# Patient Record
Sex: MALE | Race: BLACK OR AFRICAN AMERICAN | NOT HISPANIC OR LATINO | Employment: OTHER | URBAN - METROPOLITAN AREA
[De-identification: names, ages, dates, MRNs, and addresses within clinical notes are randomized per-mention and may not be internally consistent; named-entity substitution may affect disease eponyms.]

---

## 2017-01-04 ENCOUNTER — CLINICAL SUPPORT (OUTPATIENT)
Dept: REHABILITATION | Facility: HOSPITAL | Age: 67
End: 2017-01-04
Attending: NEUROLOGICAL SURGERY
Payer: OTHER GOVERNMENT

## 2017-01-04 DIAGNOSIS — R29.3 ABNORMAL POSTURE: ICD-10-CM

## 2017-01-04 DIAGNOSIS — R29.898 LEFT ARM WEAKNESS: ICD-10-CM

## 2017-01-04 PROCEDURE — 97110 THERAPEUTIC EXERCISES: CPT

## 2017-01-04 NOTE — PROGRESS NOTES
"[facility]  PATIENT EVALUATION  Time in 1230  Time out 1330  Therex 60      Name:Caesar Arevalo  Physician:Serena Zelaya MD  Orders:  Physical Therapy evaluate and treat  Clinical#:[id number]  Diagnosis: s/p cervical fusion C5-7    Visit 5t8    HPI: Patient with long h/o neck and left arm pain, pain with lifting/using left arm.  Found to have severe degenerative disc disease cervical spine.  Unable to do push ups or pull ups.  Brace discharged  12/5/16. Now reports pain greatly improved, now limited primarily by "tightness".  Lifting limited to 20#.  Has pain medication, but no longer taking it.  Retired from [employer]    SUBJECTIVE:   Pt reporting continues chronic LB pain. Compliant with HEP. Pain scale 4/10.      Pain: denies pain in neck, c/o weakness and tightness  Chief complaint: left arm weakness/tightness  Radicular symptoms: left arm weakness  Dizziness: denies  Aggravating factors: difficulty turning head to drive  Easing factors: rest  Sleep is not  disturbed.  Previous functional status: independent without limitation  Current functional status : lifting limited to 10#      Allergies:  Review of patient's allergies indicates:  No Known Allergies      OBJECTIVE:_.      Therapeutic exercise: (60 min)  Pt was educated, performed, and was provided with a written copy of  HEP to perform as tolerated, including:    MH on cervical neck and LB  x 10' while performing:  DKTC on theraball 3x10/3"  PPT 3x10/3"  Seated neck flex/ext 10x/3"  Seated cervical rotation R/L with towel   3x/30"  Supine cervical chin tucks 10x/3"  Standing GTB shld ext 30x  Standing GTB horizontal abduction 30x  Door stretch 3x/30"  Shoulder abduction 5# 3x10  Push ups against elevated table    Exercises were reviewed and pt was able to demonstrate them prior to the end of the session.     No cultural, environmental, or spiritual barriers identified to treatment or learning.      ASSESSMENT  Pt tolerating  tx well. " VC/TC for correcting form/technique with exercise along with posture. Tolerated new exercises well without pain Continue with stretching and strengthening for improved functional mobility. Pt will benefit from outpatient physical therapy to improve his ROM, strength, balance and posture to enable him  to return to full pain free function    PLAN:  Outpatient physical therapy  2 times weekly to include: pt ed, hep, therapeutic exercises, neuromuscular re-education/ balance exercises, joint mobilizations, modalities prn. Pt may be seen by PTA to carry out plan of care.      Cont PT for 6 weeks.     Rodrigue Vargas PT, DPT

## 2017-01-06 ENCOUNTER — CLINICAL SUPPORT (OUTPATIENT)
Dept: REHABILITATION | Facility: HOSPITAL | Age: 67
End: 2017-01-06
Attending: NEUROLOGICAL SURGERY
Payer: OTHER GOVERNMENT

## 2017-01-06 DIAGNOSIS — R29.898 LEFT ARM WEAKNESS: Primary | ICD-10-CM

## 2017-01-06 PROCEDURE — 97110 THERAPEUTIC EXERCISES: CPT

## 2017-01-06 NOTE — PROGRESS NOTES
"[facility]  PATIENT EVALUATION  Time in 1230  Time out 1330  Therex 60      Name:Caesar Arevalo  Physician:Serena Zelaya MD  Orders:  Physical Therapy evaluate and treat  Clinical#:[id number]  Diagnosis: s/p cervical fusion C5-7    Visit 5t8    HPI: Patient with long h/o neck and left arm pain, pain with lifting/using left arm.  Found to have severe degenerative disc disease cervical spine.  Unable to do push ups or pull ups.  Brace discharged  12/5/16. Now reports pain greatly improved, now limited primarily by "tightness".  Lifting limited to 20#.  Has pain medication, but no longer taking it.  Retired from [employer]    SUBJECTIVE:   Pt reporting continues chronic LB pain. Compliant with HEP. Pain scale 4/10.      Pain: denies pain in neck, c/o weakness and tightness  Chief complaint: left arm weakness/tightness  Radicular symptoms: left arm weakness  Dizziness: denies  Aggravating factors: difficulty turning head to drive  Easing factors: rest  Sleep is not  disturbed.  Previous functional status: independent without limitation  Current functional status : lifting limited to 10#      Allergies:  Review of patient's allergies indicates:  No Known Allergies      OBJECTIVE:_.      Therapeutic exercise: (60 min)  Pt was educated, performed, and was provided with a written copy of  HEP to perform as tolerated, including:    MH on cervical neck and LB  x 10' while performing:  DKTC on theraball 3x10/3"  LTR theraball 3x10  PPT 3x10/3"  Seated neck flex/ext 10x/3"  Seated cervical rotation R/L with towel   3x/30"  Supine cervical chin tucks 10x/3"  Standing GTB shld ext 30x  Standing GTB horizontal abduction 30x  Door stretch 3x/30"  Shoulder abduction 5# 3x10  Push ups against elevated table 3x10    Exercises were reviewed and pt was able to demonstrate them prior to the end of the session.     No cultural, environmental, or spiritual barriers identified to treatment or " learning.      ASSESSMENT  Pt tolerating  tx well. VC/TC for correcting form/technique with exercise along with posture. Tolerated new exercises well without pain Continue with stretching and strengthening for improved functional mobility. Pt will benefit from outpatient physical therapy to improve his ROM, strength, balance and posture to enable him  to return to full pain free function    PLAN:  Outpatient physical therapy  2 times weekly to include: pt ed, hep, therapeutic exercises, neuromuscular re-education/ balance exercises, joint mobilizations, modalities prn. Pt may be seen by PTA to carry out plan of care.      Cont PT for 6 weeks.     Alfonso De Jesus PTA, STS

## 2017-01-11 ENCOUNTER — CLINICAL SUPPORT (OUTPATIENT)
Dept: REHABILITATION | Facility: HOSPITAL | Age: 67
End: 2017-01-11
Attending: NEUROLOGICAL SURGERY
Payer: OTHER GOVERNMENT

## 2017-01-11 DIAGNOSIS — R29.898 LEFT ARM WEAKNESS: ICD-10-CM

## 2017-01-11 DIAGNOSIS — R29.3 ABNORMAL POSTURE: ICD-10-CM

## 2017-01-11 PROCEDURE — 97110 THERAPEUTIC EXERCISES: CPT

## 2017-01-11 NOTE — PROGRESS NOTES
"[facility]  PROGRESS NOTE  Time in 1230  Time out 1330  Therex 60      Name:Caesar Arevalo  Physician:Serena Zelaya MD  Orders:  Physical Therapy evaluate and treat  Clinical#:[id number]  Diagnosis: s/p cervical fusion C5-7    Visit 7    HPI: Patient with long h/o neck and left arm pain, pain with lifting/using left arm.  Found to have severe degenerative disc disease cervical spine.  Unable to do push ups or pull ups.  Brace discharged  12/5/16. Now reports pain greatly improved, now limited primarily by "tightness".  Lifting limited to 20#.  Has pain medication, but no longer taking it.  Retired from [employer]    SUBJECTIVE:   Pt reporting continues chronic LB pain. Compliant with HEP. Pain scale 4/10.      Pain: denies pain in neck, c/o weakness and tightness  Chief complaint: left arm weakness/tightness  Radicular symptoms: left arm weakness  Dizziness: denies  Aggravating factors: difficulty turning head to drive  Easing factors: rest  Sleep is not  disturbed.  Previous functional status: independent without limitation  Current functional status : lifting limited to 10#      Allergies:  Review of patient's allergies indicates:  No Known Allergies      OBJECTIVE:_.      Therapeutic exercise: (60 min)  Pt was educated, performed, and was provided with a written copy of  HEP to perform as tolerated, including:    MH on cervical neck and LB  x 10' while performing:  LTR 10x10 sec  PPT 3x10/3"    Seated neck flex/ext 10x/3"  Seated cervical rotation R/L with towel   3x/30"  Supine cervical chin tucks 10x/3"  Standing GTB shld ext 30x  Standing GTB horizontal abduction 30x  Door stretch 3x/30"  Shoulder abduction 5# 3x10  Push ups against elevated table 3x10    Exercises were reviewed and pt was able to demonstrate them prior to the end of the session.     No cultural, environmental, or spiritual barriers identified to treatment or learning.      ASSESSMENT  Pt tolerating  tx well. VC/TC for " correcting form/technique with exercise along with posture. Tolerated new exercises well without pain Continue with stretching and strengthening for improved functional mobility. Pt will benefit from outpatient physical therapy to improve his ROM, strength, balance and posture to enable him  to return to full pain free function  Strength continues to improve, follow up with neurosurge    PLAN:  Outpatient physical therapy  2 times weekly to include: pt ed, hep, therapeutic exercises, neuromuscular re-education/ balance exercises, joint mobilizations, modalities prn. Pt may be seen by PTA to carry out plan of care.      Cont PT for 6 weeks.     Rodrigue Vargas PT, DPT

## 2017-01-13 ENCOUNTER — CLINICAL SUPPORT (OUTPATIENT)
Dept: REHABILITATION | Facility: HOSPITAL | Age: 67
End: 2017-01-13
Attending: NEUROLOGICAL SURGERY
Payer: OTHER GOVERNMENT

## 2017-01-13 DIAGNOSIS — R29.898 LEFT ARM WEAKNESS: Primary | ICD-10-CM

## 2017-01-13 PROCEDURE — 97110 THERAPEUTIC EXERCISES: CPT

## 2017-01-13 NOTE — PROGRESS NOTES
"[facility]  PROGRESS NOTE  Time in 1230  Time out 1330  Therex 60      Name:Caesar Arevalo  Physician:Serena Zelaya MD  Orders:  Physical Therapy evaluate and treat  Clinical#:[id number]  Diagnosis: s/p cervical fusion C5-7    Visit 7    HPI: Patient with long h/o neck and left arm pain, pain with lifting/using left arm.  Found to have severe degenerative disc disease cervical spine.  Unable to do push ups or pull ups.  Brace discharged  12/5/16. Now reports pain greatly improved, now limited primarily by "tightness".  Lifting limited to 20#.  Has pain medication, but no longer taking it.  Retired from [employer]    SUBJECTIVE:   Pt reporting chronic LB pain along with cervical discomfort. Compliant with HEP. Pain scale 4/10.      Pain: denies pain in neck, c/o weakness and tightness  Chief complaint: left arm weakness/tightness  Radicular symptoms: left arm weakness  Dizziness: denies  Aggravating factors: difficulty turning head to drive  Easing factors: rest  Sleep is not  disturbed.  Previous functional status: independent without limitation  Current functional status : lifting limited to 10#      Allergies:  Review of patient's allergies indicates:  No Known Allergies      OBJECTIVE:_.      Therapeutic exercise: (60 min)  Pt was educated, performed, and was provided with a written copy of  HEP to perform as tolerated, including:    MH on cervical neck and LB  x 10'   DKTC on theraball 30x  LTR with theraball 30x  PPT 3x10/3"  Seated neck flex/ext 10x/3"  Seated cervical rotation R/L with towel   3x/30"  Standing cervical chin tucks 30x  Standing GTB shld ext 30x  Standing GTB horizontal abduction 30x  Door stretch 3x/30"  Shoulder abduction 5# 3x10  Push ups against elevated table 3x10    Exercises were reviewed and pt was able to demonstrate them prior to the end of the session.     No cultural, environmental, or spiritual barriers identified to treatment or learning.      ASSESSMENT  Pt " tolerating  tx well. VC/TC for correcting form/technique with exercise along with posture. Tolerated new exercises well without pain Continue with stretching and strengthening for improved functional mobility. Pt will benefit from outpatient physical therapy to improve his ROM, strength, balance and posture to enable him  to return to full pain free function  Strength continues to improve, follow up with neurosurge    PLAN:  Outpatient physical therapy  2 times weekly to include: pt ed, hep, therapeutic exercises, neuromuscular re-education/ balance exercises, joint mobilizations, modalities prn. Pt may be seen by PTA to carry out plan of care.      Cont PT for 6 weeks.     Alfonso De Jesus PT, DPT

## 2017-01-16 ENCOUNTER — OFFICE VISIT (OUTPATIENT)
Dept: NEUROSURGERY | Facility: CLINIC | Age: 67
End: 2017-01-16
Payer: OTHER GOVERNMENT

## 2017-01-16 ENCOUNTER — HOSPITAL ENCOUNTER (OUTPATIENT)
Dept: RADIOLOGY | Facility: HOSPITAL | Age: 67
Discharge: HOME | End: 2017-01-16
Attending: NEUROLOGICAL SURGERY
Payer: OTHER GOVERNMENT

## 2017-01-16 VITALS
DIASTOLIC BLOOD PRESSURE: 97 MMHG | BODY MASS INDEX: 35.6 KG/M2 | SYSTOLIC BLOOD PRESSURE: 140 MMHG | HEART RATE: 97 BPM | HEIGHT: 71 IN | WEIGHT: 254.31 LBS | TEMPERATURE: 98 F

## 2017-01-16 DIAGNOSIS — M47.22 OSTEOARTHRITIS OF SPINE WITH RADICULOPATHY, CERVICAL REGION: ICD-10-CM

## 2017-01-16 DIAGNOSIS — M47.816 SPONDYLOSIS OF LUMBAR REGION WITHOUT MYELOPATHY OR RADICULOPATHY: ICD-10-CM

## 2017-01-16 DIAGNOSIS — M48.02 CERVICAL STENOSIS OF SPINAL CANAL: ICD-10-CM

## 2017-01-16 DIAGNOSIS — M47.22 OSTEOARTHRITIS OF SPINE WITH RADICULOPATHY, CERVICAL REGION: Primary | ICD-10-CM

## 2017-01-16 PROCEDURE — 72125 CT NECK SPINE W/O DYE: CPT | Mod: TC

## 2017-01-16 PROCEDURE — 99213 OFFICE O/P EST LOW 20 MIN: CPT | Mod: S$PBB,,, | Performed by: NEUROLOGICAL SURGERY

## 2017-01-16 PROCEDURE — 99999 PR PBB SHADOW E&M-EST. PATIENT-LVL III: CPT | Mod: PBBFAC,,, | Performed by: NEUROLOGICAL SURGERY

## 2017-01-16 PROCEDURE — 99213 OFFICE O/P EST LOW 20 MIN: CPT | Mod: PBBFAC | Performed by: NEUROLOGICAL SURGERY

## 2017-01-16 PROCEDURE — 72125 CT NECK SPINE W/O DYE: CPT | Mod: 26,,, | Performed by: RADIOLOGY

## 2017-01-16 RX ORDER — LISINOPRIL 5 MG/1
TABLET ORAL
COMMUNITY
Start: 2016-12-24 | End: 2017-02-01

## 2017-01-16 NOTE — PROGRESS NOTES
HISTORY OF PRESENT ILLNESS:  Mr. Arevalo is a 66-year-old male who is seeing me   today in followup.  His last Neurosurgery Clinic appointment was on 12/05/2016.    He underwent a C5-C6 and C6-C7 ACDF on 10/11/2016.  Preoperatively, he   complained of a longstanding history of neck pain and left arm pain, which   radiated into his forearm.  He also described proximal left arm weakness.    Imaging studies revealed significant neural foraminal narrowing at the C5-C6 and   C6-C7 levels on the left.  Given his clinical presentation as well as   radiographic findings, the decision was made to take him to the Operating Room   for a C5 through C7 ACDF.  At the time of his last clinic appointment, he was   doing relatively well.  He complained of some interscapular pain, but this was   not bothering him terribly.  Overall, he felt as if he was doing better.  He is   here today to see me in followup with a CAT scan of the cervical spine.    Currently, from a neck standpoint, he is doing well.  He does continue to   complain of some mild neck pain, which is interscapular.  This is a little bit   better than it was at the time of his last clinic appointment.  He denies any   pain radiating into his left arm.  He is guarding his left arm less than he was   at the time of his last clinic appointment.  What is worse for the patient   currently is his back pain.  He states that he has a chronic history of low back   pain, which goes mainly into his right buttock.  He denies any radiation into   his lower extremities.  He denies any lower extremity weakness or paresthesias.    This is tolerable with pain medication, but is persistent and worsening for the   patient.    PHYSICAL EXAMINATION:  NEUROLOGIC:  He has 5/5 motor strength throughout bilateral upper and lower   extremities including the deltoid, bicep, tricep, wrist extensor, wrist flexor,   hand intrinsic, iliopsoas, quadriceps, hamstring, gastrocnemius, tibialis    anterior and extensor hallucis longus.  His sensation is intact to light touch   bilaterally throughout all distributions.  He has no Guillen's and no clonus.    IMAGING:  He has a CAT scan of the cervical spine available for review, which I   personally reviewed.  This shows good placement of all cervical hardware.  It is   stable when compared to his postoperative films.  There is no evidence of   cervical fusion yet at this point.    IMPRESSION AND PLAN:  Mr. Arevalo is a 66-year-old male status post C5-C6 and   C6-C7 ACDF.  From a cervical standpoint, he is doing well.  From a cervical   standpoint, I would like to see him back in 3 months with a followup CAT scan of   the cervical spine at that time.  However, the patient continues to complain of   low back pain.  This has been an issue that has been plaguing him since prior   to surgery.  He wishes to work up the issue further at this point.  Since his   previous MRI is over a year old, I would like to start by getting an updated MRI   of the lumbar spine as well as flexion and extension x-rays of the lumbar   spine.  Once those imaging studies are complete, I will see the patient back in   followup and we will make a further treatment plan at that time.  The patient   knows he can call with any further questions or concerns.      BABAR/ROS  dd: 01/19/2017 13:20:40 (CST)  td: 01/20/2017 00:29:58 (CST)  Doc ID   #[id number]  Job ID #[id number]    CC:       Dictation #: [id number]

## 2017-01-16 NOTE — LETTER
January 19, 2017      Sophie Yan MD  [address]  [facility] [address]           [facility] - Neurosurgery 7th Fl  [address]  [facility] [address]  Phone: [phone]          Patient: Caesar Arevalo   MR Number: [id number]   YOB: 1950   Date of Visit: 1/16/2017       Dear Dr. Sophie Yan:    Thank you for referring Caesar Arevalo to me for evaluation. Attached you will find relevant portions of my assessment and plan of care.    If you have questions, please do not hesitate to call me. I look forward to following Caesar Arevalo along with you.    Sincerely,    Serena Zelaya MD    Enclosure  CC:  No Recipients    If you would like to receive this communication electronically, please contact [email or web address] or [phone] to request more information on [employer] access.    For providers and/or their staff who would like to refer a patient to [facility], please contact us through our one-stop-shop provider referral line, [facility], at [phone].    If you feel you have received this communication in error or would no longer like to receive these types of communications, please e-mail [email or web address]

## 2017-01-16 NOTE — MR AVS SNAPSHOT
[facility] - Neurosurgery 7th Fl  [address]  [facility] [address]  Phone: [phone]                  Caesar Arevalo   2017 3:30 PM   Office Visit    Description:  Male : 1950   Provider:  Serena Zelaya MD   Department:  [facility] - Neurosurgery 7th Fl           Diagnoses this Visit        Comments    Osteoarthritis of spine with radiculopathy, cervical region    -  Primary     Spondylosis of lumbar region without myelopathy or radiculopathy                To Do List           Future Appointments        Provider Department Dept Phone    2017 12:30 PM Rodrigue Vargas, PT [facility] [phone]    2017 12:30 PM Alfonso De Jesus PTA [facility] [phone]    2017 9:30 AM LAB, SAME DAY [facility] [phone]    2017 12:30 PM Rodrigue Vargas, PT [facility] [phone]    2017 12:30 PM Alfonso De Jesus PTA [facility] [phone]      Goals (5 Years of Data)     None      Follow-Up and Disposition     Return in about 3 months (around 2017).      [facility]     [facility] Care [facility] - 24/7 Assistance  Registered nurses in the [facility] provide clinical advisement, health education, appointment booking, and other advisory services.  Call for this free service at [phone].             Medications           Message regarding Medications     Verify the changes and/or additions to your medication regime listed below are the same as discussed with your clinician today.  If any of these changes or additions are incorrect, please notify your healthcare provider.             Verify that the below list of medications is an accurate representation of the medications you are currently taking.  If none reported, the list may be blank. If incorrect, please contact your healthcare provider. Carry this list with you in case of  "emergency.           Current Medications     amlodipine (NORVASC) 10 MG tablet Take 1 tablet (10 mg total) by mouth every morning.    atorvastatin (LIPITOR) 20 MG tablet Take 1 tablet (20 mg total) by mouth once daily.    blood sugar diagnostic Strp 1 strip by Misc.(Non-Drug; Combo Route) route 3 (three) times daily. freestyle    diazePAM (VALIUM) 5 MG tablet Take 1 tablet (5 mg total) by mouth every 6 (six) hours as needed (muscle spasms).    fluticasone (FLONASE) 50 mcg/actuation nasal spray 1 spray by Each Nare route once daily.    FREESTYLE LANCETS 28 gauge lancets     hydrocodone-acetaminophen 10-325mg (NORCO)  mg Tab Take 1 tablet by mouth every 6 (six) hours as needed (pain).    lancets (ACCU-CHEK MULTICLIX LANCET) Misc 1 lancet by Misc.(Non-Drug; Combo Route) route once daily. freestyle    linagliptin (TRADJENTA) 5 mg Tab tablet Take 1 tablet (5 mg total) by mouth once daily.    lisinopril (PRINIVIL,ZESTRIL) 5 MG tablet     lisinopril 10 MG tablet Take 1 tablet (10 mg total) by mouth once daily.    loratadine (CLARITIN) 10 mg tablet Take 1 tablet (10 mg total) by mouth once daily.    metformin (GLUCOPHAGE) 1000 MG tablet Take 1 tablet (1,000 mg total) by mouth 2 (two) times daily with meals.           Clinical Reference Information           Vital Signs - Last Recorded  Most recent update: 1/16/2017  4:27 PM by Arianne Cross MA    BP Pulse Temp    (!) 140/97 (BP Location: Right arm, Patient Position: Sitting, BP Method: Automatic) 97 97.8 °F (36.6 °C) (Oral)    Ht Wt BMI    5' 11" (1.803 m) 115.3 kg (254 lb 4.8 oz) 35.47 kg/m2      Blood Pressure          Most Recent Value    BP  (!)  140/97      Allergies as of 1/16/2017     No Known Allergies      Immunizations Administered on Date of Encounter - 1/16/2017     None      Orders Placed During Today's Visit     Future Labs/Procedures Expected by Expires    CT Cervical Spine Without Contrast  1/16/2017 1/16/2018    MRI Lumbar Spine Without Contrast  " 1/16/2017 1/16/2018    X-Ray Lumbar Complete With Flex And Ext  1/16/2017 1/16/2018

## 2017-01-18 ENCOUNTER — CLINICAL SUPPORT (OUTPATIENT)
Dept: REHABILITATION | Facility: HOSPITAL | Age: 67
End: 2017-01-18
Attending: NEUROLOGICAL SURGERY
Payer: OTHER GOVERNMENT

## 2017-01-18 DIAGNOSIS — R29.898 LEFT ARM WEAKNESS: ICD-10-CM

## 2017-01-18 DIAGNOSIS — R29.3 ABNORMAL POSTURE: ICD-10-CM

## 2017-01-18 PROCEDURE — G8985 CARRY GOAL STATUS: HCPCS | Mod: CK

## 2017-01-18 PROCEDURE — 97110 THERAPEUTIC EXERCISES: CPT

## 2017-01-18 PROCEDURE — G8986 CARRY D/C STATUS: HCPCS | Mod: CJ

## 2017-01-18 NOTE — PROGRESS NOTES
"[facility]  DISCHARGE NOTE  Time in 1230  Time out 1310  Therex 40      Name:Caesar Arevalo  Physician:Serena Zelaya MD  Orders:  Physical Therapy evaluate and treat  Clinical#:[id number]  Diagnosis: s/p cervical fusion C5-7    Visit 7    SUBJECTIVE:   "I saw the doctor and she told me I don't have any limitations.  I can try pushups and pull ups"      Allergies:  Review of patient's allergies indicates:  No Known Allergies      OBJECTIVE:_.      Therapeutic exercise: (40 min)  Reviewed HEP  Push ups  Shoulder press with dumbbells  Hangs on pull up bar supported by LEs    No cultural, environmental, or spiritual barriers identified to treatment or learning.    CMS Impairment/Limitation/Restriction for FOTO Thoracic Spine Survey    Status Limitation  G-Code   CMS Severity Modifier  Intake   53%  47%  Predicted  58%  42%   Goal Status   CK - At least 40 percent but less than 60 percent  12/30/2016  64%  36%   Current Status  CJ - At least 20 percent but less than 40 percent    ASSESSMENT  Tolerated well, able to perform push ups and pull ups and progress with free weights as symptoms allow.  Educated patient and he is going to gradually resume his normal activity as able and will follow up with any questions or concerns.  PT is therefore discharged with HEP and patient is agreeable  PLAN:   DC PT with HEP    Rodrigue Vargas PT, DPT  "

## 2017-01-25 ENCOUNTER — LAB VISIT (OUTPATIENT)
Dept: LAB | Facility: HOSPITAL | Age: 67
End: 2017-01-25
Attending: FAMILY MEDICINE
Payer: OTHER GOVERNMENT

## 2017-01-25 DIAGNOSIS — E11.9 TYPE 2 DIABETES MELLITUS WITHOUT COMPLICATION, WITHOUT LONG-TERM CURRENT USE OF INSULIN: ICD-10-CM

## 2017-01-25 PROCEDURE — 36415 COLL VENOUS BLD VENIPUNCTURE: CPT

## 2017-01-25 PROCEDURE — 83036 HEMOGLOBIN GLYCOSYLATED A1C: CPT

## 2017-01-26 LAB
ESTIMATED AVG GLUCOSE: 209 MG/DL
HBA1C MFR BLD HPLC: 8.9 %

## 2017-01-27 NOTE — PLAN OF CARE
"[facility]  DISCHARGE NOTE  Time in 1230  Time out 1310  Therex 40      Name:Caesar Arevalo  Physician:Serena Zelaya MD  Orders:  Physical Therapy evaluate and treat  Clinical#:[id number]  Diagnosis: s/p cervical fusion C5-7    Visit 7    SUBJECTIVE:   "I saw the doctor and she told me I don't have any limitations.  I can try pushups and pull ups"      Allergies:  Review of patient's allergies indicates:  No Known Allergies      OBJECTIVE:_.      Therapeutic exercise: (40 min)  Reviewed HEP  Push ups  Shoulder press with dumbbells  Hangs on pull up bar supported by LEs    No cultural, environmental, or spiritual barriers identified to treatment or learning.    CMS Impairment/Limitation/Restriction for FOTO Thoracic Spine Survey    Status Limitation  G-Code   CMS Severity Modifier  Intake   53%  47%  Predicted  58%  42%   Goal Status   CK - At least 40 percent but less than 60 percent  12/30/2016  64%  36%   Current Status  CJ - At least 20 percent but less than 40 percent    ASSESSMENT  Tolerated well, able to perform push ups and pull ups and progress with free weights as symptoms allow.  Educated patient and he is going to gradually resume his normal activity as able and will follow up with any questions or concerns.  PT is therefore discharged with HEP and patient is agreeable  PLAN:   DC PT with HEP    Rodrigue Vargas PT, DPT    "

## 2017-01-30 ENCOUNTER — HOSPITAL ENCOUNTER (OUTPATIENT)
Dept: RADIOLOGY | Facility: HOSPITAL | Age: 67
Discharge: HOME | End: 2017-01-30
Attending: NEUROLOGICAL SURGERY
Payer: OTHER GOVERNMENT

## 2017-01-30 ENCOUNTER — OFFICE VISIT (OUTPATIENT)
Dept: NEUROSURGERY | Facility: CLINIC | Age: 67
End: 2017-01-30
Payer: OTHER GOVERNMENT

## 2017-01-30 VITALS
HEART RATE: 99 BPM | TEMPERATURE: 98 F | SYSTOLIC BLOOD PRESSURE: 136 MMHG | BODY MASS INDEX: 35.38 KG/M2 | HEIGHT: 71 IN | DIASTOLIC BLOOD PRESSURE: 90 MMHG | WEIGHT: 252.69 LBS

## 2017-01-30 DIAGNOSIS — M47.22 OSTEOARTHRITIS OF SPINE WITH RADICULOPATHY, CERVICAL REGION: ICD-10-CM

## 2017-01-30 DIAGNOSIS — M47.816 SPONDYLOSIS OF LUMBAR REGION WITHOUT MYELOPATHY OR RADICULOPATHY: ICD-10-CM

## 2017-01-30 DIAGNOSIS — M47.816 SPONDYLOSIS OF LUMBAR REGION WITHOUT MYELOPATHY OR RADICULOPATHY: Primary | ICD-10-CM

## 2017-01-30 PROCEDURE — 72114 X-RAY EXAM L-S SPINE BENDING: CPT | Mod: 26,,, | Performed by: RADIOLOGY

## 2017-01-30 PROCEDURE — 72148 MRI LUMBAR SPINE W/O DYE: CPT | Mod: TC

## 2017-01-30 PROCEDURE — 99213 OFFICE O/P EST LOW 20 MIN: CPT | Mod: PBBFAC | Performed by: NEUROLOGICAL SURGERY

## 2017-01-30 PROCEDURE — 72114 X-RAY EXAM L-S SPINE BENDING: CPT | Mod: TC

## 2017-01-30 PROCEDURE — 99213 OFFICE O/P EST LOW 20 MIN: CPT | Mod: S$PBB,,, | Performed by: NEUROLOGICAL SURGERY

## 2017-01-30 PROCEDURE — 99999 PR PBB SHADOW E&M-EST. PATIENT-LVL III: CPT | Mod: PBBFAC,,, | Performed by: NEUROLOGICAL SURGERY

## 2017-01-30 PROCEDURE — 72148 MRI LUMBAR SPINE W/O DYE: CPT | Mod: 26,,, | Performed by: RADIOLOGY

## 2017-01-30 RX ORDER — MELOXICAM 7.5 MG/1
7.5 TABLET ORAL DAILY
Qty: 30 TABLET | Refills: 3 | Status: SHIPPED | OUTPATIENT
Start: 2017-01-30 | End: 2017-01-30

## 2017-01-30 RX ORDER — MELOXICAM 7.5 MG/1
7.5 TABLET ORAL DAILY
Qty: 30 TABLET | Refills: 4 | Status: SHIPPED | OUTPATIENT
Start: 2017-01-30 | End: 2017-06-06

## 2017-01-30 NOTE — PROGRESS NOTES
HISTORY OF PRESENT ILLNESS:  Mr. Arevalo is a 66-year-old male who is seeing me   today in followup.  His last Neurosurgery Clinic appointment was on 01/16/2017.    Mr. Arevalo underwent a C5-C6 and C6-C7 ACDF in October 2016.  I have been   following him for his neck pain.  He has done well postoperatively.  At the time   of his last clinic appointment, he complained of worse back pain.  He stated   that he has a chronic history of low back pain, which goes mainly into his right   buttock.  He denied any radiation into his lower extremities.  He denied any   lower extremity weakness or paresthesias.  The pain was tolerable with pain   medication, but was persistent and worsening.  He did not have any updated   imaging studies of the lumbar spine.  Therefore, I had sent him for   flexion-extension x-rays of the lumbar spine as well as an MRI of the lumbar   spine and he is here today to see me in followup.  Currently, he continues to   complain of pain.  He thinks that it may be a little bit better today, but the   pain does wax and wane.  Again, the pain is located mainly in his low back   without radiation.    PHYSICAL EXAMINATION:  He remains neurologically intact on today's examination   with 5/5 motor strength throughout bilateral lower extremities including the   iliopsoas, quadriceps, hamstring, gastrocnemius, tibialis anterior and extensor   hallucis longus.    IMAGING:  He has a flexion-extension x-ray of the lumbar spine available for   review, which I personally reviewed.  This shows multilevel facet hypertrophy,   worse at the L4-L5 and L5-S1 levels.  There is multilevel lumbar spondylosis and   degenerative disc disease.  He has an MRI of the lumbar spine available for   review, which I personally reviewed.  I again this re-demonstrates multilevel   lumbar spondylosis.  There is epidural lipomatosis from L3-S1 causing moderate   central canal narrowing.  Otherwise, there is no significant central  canal   narrowing or neural foraminal narrowing throughout, but there is multilevel   facet hypertrophy again worse at the L3-L4 and L4-L5 levels.    IMPRESSION AND PLAN:  Mr. Arevalo is a 66-year-old male status post C5-C6 and   C6-C7 ACDF.  He is doing well from a neck standpoint, but continues to complain   of low back pain.  His imaging studies are as described above.  I do believe   that his pain is caused from his significant facet arthropathy, but I do not   believe that this is surgical.  I have given the patient a prescription for   Mobic to see if this helps his pain.  I have also given him a referral to Pain   Management for possible facet injections.  He already has followup scheduled   with me in 2-3 months for his cervical spine.  We will rediscuss his lumbar   spine issues at that time as well.  Otherwise, he knows he can call with any   further questions or concerns.      DUNIA  dd: 01/30/2017 17:24:50 (CST)  td: 01/31/2017 11:41:27 (CST)  Doc ID   #[id number]  Job ID #[id number]    CC:       Dictation #: [id number]

## 2017-02-01 ENCOUNTER — TELEPHONE (OUTPATIENT)
Dept: INTERNAL MEDICINE | Facility: CLINIC | Age: 67
End: 2017-02-01

## 2017-02-01 ENCOUNTER — HOSPITAL ENCOUNTER (OUTPATIENT)
Dept: RADIOLOGY | Facility: HOSPITAL | Age: 67
Discharge: HOME | End: 2017-02-01
Attending: FAMILY MEDICINE
Payer: OTHER GOVERNMENT

## 2017-02-01 ENCOUNTER — CLINICAL SUPPORT (OUTPATIENT)
Dept: DIABETES | Facility: CLINIC | Age: 67
End: 2017-02-01
Payer: OTHER GOVERNMENT

## 2017-02-01 ENCOUNTER — OFFICE VISIT (OUTPATIENT)
Dept: INTERNAL MEDICINE | Facility: CLINIC | Age: 67
End: 2017-02-01
Payer: OTHER GOVERNMENT

## 2017-02-01 VITALS
DIASTOLIC BLOOD PRESSURE: 80 MMHG | HEART RATE: 99 BPM | SYSTOLIC BLOOD PRESSURE: 132 MMHG | OXYGEN SATURATION: 96 % | WEIGHT: 250.88 LBS | HEIGHT: 71 IN | BODY MASS INDEX: 35.12 KG/M2

## 2017-02-01 DIAGNOSIS — Z22.7 TB LUNG, LATENT: ICD-10-CM

## 2017-02-01 DIAGNOSIS — I10 ESSENTIAL HYPERTENSION: Primary | ICD-10-CM

## 2017-02-01 DIAGNOSIS — Z87.891 FORMER SMOKER: ICD-10-CM

## 2017-02-01 DIAGNOSIS — R79.89 LOW VITAMIN D LEVEL: ICD-10-CM

## 2017-02-01 DIAGNOSIS — E78.5 HYPERLIPIDEMIA, UNSPECIFIED HYPERLIPIDEMIA TYPE: ICD-10-CM

## 2017-02-01 DIAGNOSIS — E11.9 TYPE 2 DIABETES MELLITUS WITHOUT COMPLICATION, WITHOUT LONG-TERM CURRENT USE OF INSULIN: ICD-10-CM

## 2017-02-01 DIAGNOSIS — M47.816 LUMBAR FACET ARTHROPATHY: ICD-10-CM

## 2017-02-01 PROCEDURE — 71020 XR CHEST PA AND LATERAL: CPT | Mod: 26,,, | Performed by: RADIOLOGY

## 2017-02-01 PROCEDURE — 99999 PR PBB SHADOW E&M-EST. PATIENT-LVL III: CPT | Mod: PBBFAC,,, | Performed by: FAMILY MEDICINE

## 2017-02-01 PROCEDURE — G0108 DIAB MANAGE TRN  PER INDIV: HCPCS | Mod: PBBFAC | Performed by: DIETITIAN, REGISTERED

## 2017-02-01 PROCEDURE — 71020 XR CHEST PA AND LATERAL: CPT | Mod: TC

## 2017-02-01 PROCEDURE — 99213 OFFICE O/P EST LOW 20 MIN: CPT | Mod: PBBFAC | Performed by: FAMILY MEDICINE

## 2017-02-01 PROCEDURE — 99214 OFFICE O/P EST MOD 30 MIN: CPT | Mod: S$PBB,,, | Performed by: FAMILY MEDICINE

## 2017-02-01 RX ORDER — AMLODIPINE BESYLATE 10 MG/1
10 TABLET ORAL EVERY MORNING
Qty: 90 TABLET | Refills: 1 | Status: SHIPPED | OUTPATIENT
Start: 2017-02-01 | End: 2017-06-06 | Stop reason: SDUPTHER

## 2017-02-01 RX ORDER — LISINOPRIL 10 MG/1
10 TABLET ORAL DAILY
Qty: 90 TABLET | Refills: 1 | Status: SHIPPED | OUTPATIENT
Start: 2017-02-01 | End: 2017-06-06 | Stop reason: SDUPTHER

## 2017-02-01 RX ORDER — METFORMIN HYDROCHLORIDE 1000 MG/1
1000 TABLET ORAL 2 TIMES DAILY WITH MEALS
Qty: 180 TABLET | Refills: 1 | Status: SHIPPED | OUTPATIENT
Start: 2017-02-01 | End: 2017-06-06 | Stop reason: SDUPTHER

## 2017-02-01 RX ORDER — ATORVASTATIN CALCIUM 20 MG/1
20 TABLET, FILM COATED ORAL EVERY MORNING
Qty: 90 TABLET | Refills: 1 | Status: SHIPPED | OUTPATIENT
Start: 2017-02-01 | End: 2017-06-06 | Stop reason: SDUPTHER

## 2017-02-01 NOTE — MR AVS SNAPSHOT
[facility]  [address] [address]  Phone: [phone]  Fax: [phone]                  Caesar Arevalo   2017 9:30 AM   Office Visit    Description:  Male : 1950   Provider:  Sophie Yan MD   Department:  [facility]           Reason for Visit     Follow-up           Diagnoses this Visit        Comments    Essential hypertension    -  Primary     Type 2 diabetes mellitus without complication, without long-term current use of insulin         Hyperlipidemia, unspecified hyperlipidemia type         Low vitamin D level         Lumbar facet arthropathy         TB lung, latent         Former smoker                To Do List           Future Appointments        Provider Department Dept Phone    2017 9:30 AM [facility] CT1 [address]- LIMIT [address] L[address] [facility] [phone]    2017 11:30 AM [facility] - Neurosurgery 7th Fl [phone]      Goals (5 Years of Data)     None      Follow-Up and Disposition     Return in about 4 months (around 2017), or if symptoms worsen or fail to improve.    Follow-up and Disposition History       These Medications        Disp Refills Start End    metformin (GLUCOPHAGE) 1000 MG tablet 180 tablet 1 2017    Take 1 tablet (1,000 mg total) by mouth 2 (two) times daily with meals. - Oral    Pharmacy: [facility] - [address] Ph #: [phone]       linagliptin (TRADJENTA) 5 mg Tab tablet 90 tablet 1 2017     Take 1 tablet (5 mg total) by mouth once daily. - Oral    Pharmacy: [facility] - [address] Ph #: [phone]       blood sugar diagnostic Strp 300 strip 6 2017     1 strip by Misc.(Non-Drug; Combo Route) route 3 (three) times daily. freestyle - Misc.(Non-Drug; Combo Route)    Pharmacy: [facility] - [address] Ph #: [phone]       atorvastatin (LIPITOR) 20 MG tablet 90 tablet 1 2/1/2017     Take 1 tablet (20 mg total) by mouth every morning. - Oral    Pharmacy: [facility] - [address] Ph #: [phone]       amlodipine (NORVASC) 10 MG tablet 90 tablet 1 2/1/2017 2/1/2018    Take 1 tablet (10 mg total) by mouth every morning. - Oral    Pharmacy: [facility] - [address] Ph #: [phone]       lisinopril 10 MG tablet 90 tablet 1 2/1/2017 2/1/2018    Take 1 tablet (10 mg total) by mouth once daily. - Oral    Pharmacy: [facility] - [address] Ph #: [phone]         [facility]     [facility] - 24/7 Assistance  Registered nurses in the [facility] provide clinical advisement, health education, appointment booking, and other advisory services.  Call for this free service at [phone].             Medications           Message regarding Medications     Verify the changes and/or additions to your medication regime listed below are the same as discussed with your clinician today.  If any of these changes or additions are incorrect, please notify your healthcare provider.        CHANGE how you are taking these medications     Start Taking Instead of    atorvastatin (LIPITOR) 20 MG tablet atorvastatin (LIPITOR) 20 MG tablet    Dosage:  Take 1 tablet (20 mg total) by mouth every morning. Dosage:  Take 1 tablet (20 mg total) by mouth once daily.    Reason for Change:  Reorder            Verify that the below list of medications is an accurate representation of the medications you are currently taking.  If none reported, the list may be blank. If incorrect, please contact your healthcare provider. Carry this list with you in case of emergency.           Current Medications     amlodipine (NORVASC) 10 MG tablet Take 1 tablet (10 mg total) by  "mouth every morning.    atorvastatin (LIPITOR) 20 MG tablet Take 1 tablet (20 mg total) by mouth every morning.    blood sugar diagnostic Strp 1 strip by Misc.(Non-Drug; Combo Route) route 3 (three) times daily. freestyle    diazePAM (VALIUM) 5 MG tablet Take 1 tablet (5 mg total) by mouth every 6 (six) hours as needed (muscle spasms).    fluticasone (FLONASE) 50 mcg/actuation nasal spray 1 spray by Each Nare route once daily.    FREESTYLE LANCETS 28 gauge lancets     lancets (ACCU-CHEK MULTICLIX LANCET) Misc 1 lancet by Misc.(Non-Drug; Combo Route) route once daily. freestyle    linagliptin (TRADJENTA) 5 mg Tab tablet Take 1 tablet (5 mg total) by mouth once daily.    metformin (GLUCOPHAGE) 1000 MG tablet Take 1 tablet (1,000 mg total) by mouth 2 (two) times daily with meals.    hydrocodone-acetaminophen 10-325mg (NORCO)  mg Tab Take 1 tablet by mouth every 6 (six) hours as needed (pain).    lisinopril 10 MG tablet Take 1 tablet (10 mg total) by mouth once daily.    loratadine (CLARITIN) 10 mg tablet Take 1 tablet (10 mg total) by mouth once daily.    meloxicam (MOBIC) 7.5 MG tablet Take 1 tablet (7.5 mg total) by mouth once daily.           Clinical Reference Information           Vital Signs - Last Recorded  Most recent update: 2/1/2017  9:39 AM by Becca Adams    BP Pulse Ht Wt SpO2 BMI    132/80 99 5' 11" (1.803 m) 113.8 kg (250 lb 14.1 oz) 96% 34.99 kg/m2      Blood Pressure          Most Recent Value    BP  132/80      Allergies as of 2/1/2017     No Known Allergies      Immunizations Administered on Date of Encounter - 2/1/2017     None      Orders Placed During Today's Visit     Future Labs/Procedures Expected by Expires    CBC auto differential  2/1/2017 4/2/2018    Comprehensive metabolic panel  2/1/2017 4/2/2018    Lipid panel  2/1/2017 4/2/2018    TSH  2/1/2017 4/2/2018    Vitamin D  2/1/2017 4/2/2018    X-Ray Chest PA And Lateral  2/1/2017 2/1/2018    Hemoglobin A1c  1/2/2018 (Approximate) 4/2/2018 "    Complete PFT w/ bronchodilator  As directed 2/1/2018      Instructions    Cymbalta

## 2017-02-01 NOTE — PROGRESS NOTES
02/01/17 0000   Diabetes Type   Diabetes Type  Type II  (6 month follow up)   Diabetes History   Diabetes Diagnosis >10 years   Nutrition   Meal Planning skipping meals;eats out seldom;snacks between meal;diet drinks  ((Eats 2-3 meals a day; has some tendency to be a nervous snacker (sweets tends to ease his mind); drinks cyrstal light, diet drinks, coffee, some milk and juice,))   Monitoring    Monitoring Free Lite   Self Monitoring  (Checks 3 times a day usually)   Blood Glucose Logs No   Exercise    Exercise Type (None; C5-C6 and C6-C7 ACDF in October 2016; still suffers from back pain)   Current Diabetes Treatment    Current Treatment Oral Medicaiton  (Tradjenta and Metformin - denies missing doses of medication)   Diabetes Education Visit   Diabetes Education Record Assessment/Progress Post Program/Follow-up   Diabetes Education Assessment/Progress   Acute Complications (preventing, detecting, and treating acute complications) 5;DC;IS;W  (Reviewed s/s and treatment of hypoglycemia)   Chronic Complications (preventing, detecting, and treating chronic complications) 5;DC;IS;W   Diabetes Disease Process (diabetes disease process and treatment options) 5;DC;IS;W   Nutrition (Incorporating nutritional management into one's lifestyle) 5;DC;IS;W  (Reviewed plate method and encouraged to eat 3 meals a day)   Physical Activity (incorporating physical activity into one's lifestyle) 5;DC;IS;W   Medications (states correct name, dose, onset, peak, duration, side effects & timing of meds) 5;DC;IS;W   Monitoring (monitoring blood glucose/other parameters &using results) 5;DC;IS;W   Goal Setting and Problem Solving (verbalizes behavior change strategies & sets realistic goals) 5;DC;IS   Behavior Change (developing personal strategies to health & behavior change) 5;DC;IS   Psychosocial Issues (deveopling personal srategies to address psychosocial concerns) 5;DC;IS  (Patient reports his stress has improved since his last  visit)   Goals   Other % Met   Met Percentage  100%   Diabetes Self-Management Support Plan   Stress Management family;friends   Review Status Patient reviewed and agreed to support plan.   Diabetes Care Plan/Intervention   Education Plan/Intervention Other  (Patient to call with questions or concerns or if would like to schedule a follow up. He is scheduled with his PCP in June. He is still not interested in Empowerment at this time.)   Education Units of Time    Time Spent 30 min

## 2017-02-01 NOTE — PROGRESS NOTES
Subjective:       Patient ID: Caesar Arevalo is a 66 y.o. male.    Chief Complaint: Follow-up    HPI 65y/o with DM2, HTN, is post op ACDF on 10/11/16 his neck has improved overall, CLBP,is here for follow up.   He is not doing much exercise, he tries to bike sometimes, denies f/n/v/d/constipation/cp/sob/urinary sx.   DM2: last few A1C trending up 8.9, he reports he has been checking in the mornings range 118-180, not on asa, on statin, ace, plans to follow up diabetes empowerment  CLBP: reports he is not taking Norco or Valium, plans to try Mobic  Script for Zoster given  Hx prostate procedure, recently seen by Dr. Chavarria in Nov, reports psa was doen    Review of Systems   Constitutional: Negative for activity change, appetite change, fatigue and fever.   Respiratory: Negative for cough and shortness of breath.    Cardiovascular: Negative for chest pain, palpitations and leg swelling.   Gastrointestinal: Negative for constipation, diarrhea, nausea and vomiting.   Genitourinary: Negative for difficulty urinating and dysuria.   Musculoskeletal: Positive for arthralgias and back pain.   Skin: Negative for rash.   Neurological: Negative for dizziness and light-headedness.   Psychiatric/Behavioral: Negative for sleep disturbance.       Objective:      Physical Exam   Constitutional: He appears well-developed and well-nourished.   HENT:   Head: Normocephalic and atraumatic.   Mouth/Throat: No oropharyngeal exudate.   Neck: Normal range of motion. Neck supple. No thyromegaly present.   Cardiovascular: Normal rate, regular rhythm and normal heart sounds.    Pulmonary/Chest: Effort normal and breath sounds normal. No respiratory distress.   Musculoskeletal: He exhibits no edema.   Lymphadenopathy:     He has no cervical adenopathy.   Neurological: He is alert.   Skin: Skin is warm and dry.   Psychiatric: He has a normal mood and affect.   Nursing note and vitals reviewed.      Assessment:       1. Essential hypertension     2. Type 2 diabetes mellitus without complication, without long-term current use of insulin    3. Hyperlipidemia, unspecified hyperlipidemia type    4. Low vitamin D level    5. Lumbar facet arthropathy    6. TB lung, latent    7. Former smoker        Plan:       Caesar was seen today for follow-up.    Diagnoses and all orders for this visit:    Essential hypertension  -     amlodipine (NORVASC) 10 MG tablet; Take 1 tablet (10 mg total) by mouth every morning.  -     lisinopril 10 MG tablet; Take 1 tablet (10 mg total) by mouth once daily.  -     CBC auto differential; Future  -     Comprehensive metabolic panel; Future  -     TSH; Future    Type 2 diabetes mellitus without complication, without long-term current use of insulin  -     metformin (GLUCOPHAGE) 1000 MG tablet; Take 1 tablet (1,000 mg total) by mouth 2 (two) times daily with meals.  -     linagliptin (TRADJENTA) 5 mg Tab tablet; Take 1 tablet (5 mg total) by mouth once daily.  -     blood sugar diagnostic Strp; 1 strip by Misc.(Non-Drug; Combo Route) route 3 (three) times daily. freestyle  -     atorvastatin (LIPITOR) 20 MG tablet; Take 1 tablet (20 mg total) by mouth every morning.  -     CBC auto differential; Future  -     Comprehensive metabolic panel; Future  -     Hemoglobin A1c; Future    Hyperlipidemia, unspecified hyperlipidemia type  -     atorvastatin (LIPITOR) 20 MG tablet; Take 1 tablet (20 mg total) by mouth every morning.  -     Lipid panel; Future    Low vitamin D level  -     Vitamin D; Future    Lumbar facet arthropathy    TB lung, latent  -     X-Ray Chest PA And Lateral; Future  -     Complete PFT w/ bronchodilator; Future    Former smoker  -     X-Ray Chest PA And Lateral; Future  -     Complete PFT w/ bronchodilator; Future

## 2017-02-01 NOTE — PROGRESS NOTES
"Caesar Arevalo presented for a  Medicare AWV and comprehensive Health Risk Assessment today. The following components were reviewed and updated:    · Medical history  · Family History  · Social history  · Allergies and Current Medications  · Health Risk Assessment  · Health Maintenance  · Care Team     ** See Completed Assessments for Annual Wellness Visit within the encounter summary.**       The following assessments were completed:  · Living Situation  · CAGE  · Depression Screening  · Timed Get Up and Go  · Whisper Test  · Cognitive Function Screening  · Nutrition Screening  · ADL Screening  · PAQ Screening    Vitals:    02/01/17 0935   BP: 132/80   Pulse: 99   SpO2: 96%   Weight: 113.8 kg (250 lb 14.1 oz)   Height: 5' 11" (1.803 m)     Body mass index is 34.99 kg/(m^2).  Physical Exam      Diagnoses and health risks identified today and associated recommendations/orders:    There are no diagnoses linked to this encounter.    Provided Caesar with a 5-10 year written screening schedule and personal prevention plan. Recommendations were developed using the USPSTF age appropriate recommendations. Education, counseling, and referrals were provided as needed. After Visit Summary printed and given to patient which includes a list of additional screenings\tests needed.    No Follow-up on file.    Sophie Yan MD  "

## 2017-02-02 ENCOUNTER — HOSPITAL ENCOUNTER (OUTPATIENT)
Dept: PULMONOLOGY | Facility: CLINIC | Age: 67
Discharge: HOME | End: 2017-02-02
Payer: OTHER GOVERNMENT

## 2017-02-02 DIAGNOSIS — Z87.891 FORMER SMOKER: ICD-10-CM

## 2017-02-02 DIAGNOSIS — Z22.7 TB LUNG, LATENT: ICD-10-CM

## 2017-02-02 LAB
POST FEV1 FVC: 0.78
POST FEV1: 2.17
POST FVC: 2.78
PRE FEV1 FVC: 77
PRE FEV1: 2.15
PRE FVC: 2.8
PREDICTED FEV1 FVC: 80
PREDICTED FEV1: 3.28
PREDICTED FVC: 4.09

## 2017-02-02 PROCEDURE — 94060 EVALUATION OF WHEEZING: CPT | Mod: 26,S$PBB,, | Performed by: INTERNAL MEDICINE

## 2017-02-02 PROCEDURE — 94729 DIFFUSING CAPACITY: CPT | Mod: PBBFAC | Performed by: INTERNAL MEDICINE

## 2017-02-02 PROCEDURE — 94060 EVALUATION OF WHEEZING: CPT | Mod: PBBFAC | Performed by: INTERNAL MEDICINE

## 2017-02-02 PROCEDURE — 94729 DIFFUSING CAPACITY: CPT | Mod: 26,S$PBB,, | Performed by: INTERNAL MEDICINE

## 2017-02-14 ENCOUNTER — TELEPHONE (OUTPATIENT)
Dept: INTERNAL MEDICINE | Facility: CLINIC | Age: 67
End: 2017-02-14

## 2017-02-27 ENCOUNTER — TELEPHONE (OUTPATIENT)
Dept: INTERNAL MEDICINE | Facility: CLINIC | Age: 67
End: 2017-02-27

## 2017-02-27 DIAGNOSIS — R94.2 ABNORMAL PULMONARY FUNCTION TEST: Primary | ICD-10-CM

## 2017-04-17 ENCOUNTER — HOSPITAL ENCOUNTER (OUTPATIENT)
Dept: RADIOLOGY | Facility: HOSPITAL | Age: 67
Discharge: HOME | End: 2017-04-17
Attending: NEUROLOGICAL SURGERY
Payer: OTHER GOVERNMENT

## 2017-04-17 DIAGNOSIS — M47.816 SPONDYLOSIS OF LUMBAR REGION WITHOUT MYELOPATHY OR RADICULOPATHY: ICD-10-CM

## 2017-04-17 DIAGNOSIS — M47.22 OSTEOARTHRITIS OF SPINE WITH RADICULOPATHY, CERVICAL REGION: ICD-10-CM

## 2017-04-17 PROCEDURE — 72125 CT NECK SPINE W/O DYE: CPT | Mod: 26,,, | Performed by: RADIOLOGY

## 2017-04-17 PROCEDURE — 72125 CT NECK SPINE W/O DYE: CPT | Mod: TC

## 2017-05-04 ENCOUNTER — OFFICE VISIT (OUTPATIENT)
Dept: NEUROSURGERY | Facility: CLINIC | Age: 67
End: 2017-05-04
Payer: OTHER GOVERNMENT

## 2017-05-04 VITALS
HEART RATE: 102 BPM | BODY MASS INDEX: 35.42 KG/M2 | WEIGHT: 253 LBS | DIASTOLIC BLOOD PRESSURE: 94 MMHG | SYSTOLIC BLOOD PRESSURE: 156 MMHG | TEMPERATURE: 98 F | HEIGHT: 71 IN

## 2017-05-04 DIAGNOSIS — M47.22 OSTEOARTHRITIS OF SPINE WITH RADICULOPATHY, CERVICAL REGION: Primary | ICD-10-CM

## 2017-05-04 DIAGNOSIS — M48.02 CERVICAL STENOSIS OF SPINAL CANAL: ICD-10-CM

## 2017-05-04 PROCEDURE — 99213 OFFICE O/P EST LOW 20 MIN: CPT | Mod: S$PBB,,, | Performed by: NEUROLOGICAL SURGERY

## 2017-05-04 PROCEDURE — 99999 PR PBB SHADOW E&M-EST. PATIENT-LVL III: CPT | Mod: PBBFAC,,, | Performed by: NEUROLOGICAL SURGERY

## 2017-05-04 PROCEDURE — 99213 OFFICE O/P EST LOW 20 MIN: CPT | Mod: PBBFAC | Performed by: NEUROLOGICAL SURGERY

## 2017-05-04 NOTE — PROGRESS NOTES
HISTORY OF PRESENT ILLNESS:  Mr. Arevalo is a 66-year-old male who is seeing me   today in followup.  His last Neurosurgery Clinic appointment was on 01/30/2017.    He underwent a C5-C6 and C6-C7 ACDF in October 2016.  I have been following   him for neck pain.  He has done well postoperatively.  At the time of his last   clinic appointment, he had minimal neck pain, but did complain of some low back   pain.  We have done an MRI of the lumbar spine, which showed multilevel lumbar   spondylosis, but nothing surgical, therefore, I have recommended conservative   therapy for the spine.  I have given him Mobic and referred him to Pain   Management for facet injections.  He is here today to see me in followup.    Currently, he denies any neck pain or arm pain.  He does feel like he has a lump   in the anterior aspect of his throat, which has been present for 2-3 weeks.  He   continues to complain of low back pain, going mainly into his right buttock.    He remains neurologically intact on physical examination with 5/5 motor strength   throughout bilateral upper and lower extremities including the deltoid, bicep,   tricep, wrist extensor, wrist flexor, hand intrinsic, iliopsoas, quadriceps,   hamstring, gastrocnemius, tibialis anterior and extensor hallucis longus.    He has a CAT scan of the cervical spine available for review, which I personally   reviewed.  This shows stable hardware placement at the C5-C6 and the C6-C7   levels.  There does appear to be fusion at both levels, more so posteriorly at   C5-C6 and more so anteriorly at C6-C7.  There is a hypodensity in the T3   vertebral body, which is stable from our previous CAT scans, which is most   likely representative of benign hemangioma.    IMPRESSION AND PLAN:  Mr. Arevalo is a 66-year-old male status post C5-C6 and   C6-C7 ACDF.  He is doing well from a neck standpoint.  His imaging studies are   as described above.  He is healing fusing at expected from a  lumbar standpoint.    He does not feel the Mobic has helped and he has not gotten the facet   injections yet.  We will continue conservative therapy.  I have no plans for   surgical intervention from the lumbar spine.  From cervical standpoint, I would   like to see him back in six months with a repeat CT of the cervical spine at   that time to evaluate the fusion.  He knows he can call with any further   questions or concerns in the meantime.      BABAR/ROS  dd: 05/04/2017 17:13:26 (CDT)  td: 05/05/2017 17:16:08 (CDT)  Doc ID   #[id number]  Job ID #[id number]    CC:       Dictation #: [id number]

## 2017-05-30 ENCOUNTER — LAB VISIT (OUTPATIENT)
Dept: LAB | Facility: HOSPITAL | Age: 67
End: 2017-05-30
Attending: FAMILY MEDICINE
Payer: OTHER GOVERNMENT

## 2017-05-30 DIAGNOSIS — E78.5 HYPERLIPIDEMIA, UNSPECIFIED HYPERLIPIDEMIA TYPE: ICD-10-CM

## 2017-05-30 DIAGNOSIS — R79.89 LOW VITAMIN D LEVEL: ICD-10-CM

## 2017-05-30 DIAGNOSIS — E11.9 TYPE 2 DIABETES MELLITUS WITHOUT COMPLICATION, WITHOUT LONG-TERM CURRENT USE OF INSULIN: ICD-10-CM

## 2017-05-30 DIAGNOSIS — I10 ESSENTIAL HYPERTENSION: ICD-10-CM

## 2017-05-30 LAB
25(OH)D3+25(OH)D2 SERPL-MCNC: 28 NG/ML
ALBUMIN SERPL BCP-MCNC: 4.1 G/DL
ALP SERPL-CCNC: 85 U/L
ALT SERPL W/O P-5'-P-CCNC: 20 U/L
ANION GAP SERPL CALC-SCNC: 9 MMOL/L
AST SERPL-CCNC: 15 U/L
BASOPHILS # BLD AUTO: 0.02 K/UL
BASOPHILS NFR BLD: 0.3 %
BILIRUB SERPL-MCNC: 0.6 MG/DL
BUN SERPL-MCNC: 15 MG/DL
CALCIUM SERPL-MCNC: 8.9 MG/DL
CHLORIDE SERPL-SCNC: 102 MMOL/L
CHOLEST/HDLC SERPL: 3.7 {RATIO}
CO2 SERPL-SCNC: 27 MMOL/L
CREAT SERPL-MCNC: 1.4 MG/DL
DIFFERENTIAL METHOD: ABNORMAL
EOSINOPHIL # BLD AUTO: 0.3 K/UL
EOSINOPHIL NFR BLD: 4.6 %
ERYTHROCYTE [DISTWIDTH] IN BLOOD BY AUTOMATED COUNT: 14.2 %
EST. GFR  (AFRICAN AMERICAN): >60 ML/MIN/1.73 M^2
EST. GFR  (NON AFRICAN AMERICAN): 52.7 ML/MIN/1.73 M^2
GLUCOSE SERPL-MCNC: 209 MG/DL
HCT VFR BLD AUTO: 40.8 %
HDL/CHOLESTEROL RATIO: 27 %
HDLC SERPL-MCNC: 126 MG/DL
HDLC SERPL-MCNC: 34 MG/DL
HGB BLD-MCNC: 14.5 G/DL
LDLC SERPL CALC-MCNC: 70.4 MG/DL
LYMPHOCYTES # BLD AUTO: 1.9 K/UL
LYMPHOCYTES NFR BLD: 26.7 %
MCH RBC QN AUTO: 26.6 PG
MCHC RBC AUTO-ENTMCNC: 35.5 %
MCV RBC AUTO: 75 FL
MONOCYTES # BLD AUTO: 0.6 K/UL
MONOCYTES NFR BLD: 7.8 %
NEUTROPHILS # BLD AUTO: 4.3 K/UL
NEUTROPHILS NFR BLD: 59.8 %
NONHDLC SERPL-MCNC: 92 MG/DL
PLATELET # BLD AUTO: 296 K/UL
PMV BLD AUTO: 10.3 FL
POTASSIUM SERPL-SCNC: 4 MMOL/L
PROT SERPL-MCNC: 7.6 G/DL
RBC # BLD AUTO: 5.45 M/UL
SODIUM SERPL-SCNC: 138 MMOL/L
TRIGL SERPL-MCNC: 108 MG/DL
TSH SERPL DL<=0.005 MIU/L-ACNC: 1.25 UIU/ML
WBC # BLD AUTO: 7.2 K/UL

## 2017-05-30 PROCEDURE — 80053 COMPREHEN METABOLIC PANEL: CPT

## 2017-05-30 PROCEDURE — 82306 VITAMIN D 25 HYDROXY: CPT

## 2017-05-30 PROCEDURE — 85025 COMPLETE CBC W/AUTO DIFF WBC: CPT

## 2017-05-30 PROCEDURE — 80061 LIPID PANEL: CPT

## 2017-05-30 PROCEDURE — 36415 COLL VENOUS BLD VENIPUNCTURE: CPT

## 2017-05-30 PROCEDURE — 83036 HEMOGLOBIN GLYCOSYLATED A1C: CPT

## 2017-05-30 PROCEDURE — 84443 ASSAY THYROID STIM HORMONE: CPT

## 2017-05-31 LAB
ESTIMATED AVG GLUCOSE: 200 MG/DL
HBA1C MFR BLD HPLC: 8.6 %

## 2017-06-06 ENCOUNTER — HOSPITAL ENCOUNTER (OUTPATIENT)
Dept: PULMONOLOGY | Facility: CLINIC | Age: 67
Discharge: HOME | End: 2017-06-06
Payer: OTHER GOVERNMENT

## 2017-06-06 ENCOUNTER — OFFICE VISIT (OUTPATIENT)
Dept: INTERNAL MEDICINE | Facility: CLINIC | Age: 67
End: 2017-06-06
Payer: OTHER GOVERNMENT

## 2017-06-06 ENCOUNTER — OFFICE VISIT (OUTPATIENT)
Dept: PULMONOLOGY | Facility: CLINIC | Age: 67
End: 2017-06-06
Payer: OTHER GOVERNMENT

## 2017-06-06 VITALS
HEART RATE: 88 BPM | WEIGHT: 249.44 LBS | BODY MASS INDEX: 34.92 KG/M2 | SYSTOLIC BLOOD PRESSURE: 138 MMHG | HEIGHT: 71 IN | DIASTOLIC BLOOD PRESSURE: 88 MMHG

## 2017-06-06 VITALS
DIASTOLIC BLOOD PRESSURE: 84 MMHG | SYSTOLIC BLOOD PRESSURE: 150 MMHG | WEIGHT: 252 LBS | BODY MASS INDEX: 35.28 KG/M2 | HEIGHT: 71 IN | HEART RATE: 102 BPM | OXYGEN SATURATION: 96 %

## 2017-06-06 DIAGNOSIS — E78.5 HYPERLIPIDEMIA, UNSPECIFIED HYPERLIPIDEMIA TYPE: ICD-10-CM

## 2017-06-06 DIAGNOSIS — I10 ESSENTIAL HYPERTENSION: Primary | ICD-10-CM

## 2017-06-06 DIAGNOSIS — R06.09 DOE (DYSPNEA ON EXERTION): ICD-10-CM

## 2017-06-06 DIAGNOSIS — J30.9 ALLERGIC RHINITIS, UNSPECIFIED ALLERGIC RHINITIS TRIGGER, UNSPECIFIED RHINITIS SEASONALITY: ICD-10-CM

## 2017-06-06 DIAGNOSIS — R06.09 DOE (DYSPNEA ON EXERTION): Primary | ICD-10-CM

## 2017-06-06 DIAGNOSIS — E11.40 TYPE 2 DIABETES MELLITUS WITH DIABETIC NEUROPATHY, WITHOUT LONG-TERM CURRENT USE OF INSULIN: ICD-10-CM

## 2017-06-06 DIAGNOSIS — J98.4 RESTRICTIVE LUNG DISEASE: ICD-10-CM

## 2017-06-06 DIAGNOSIS — Z87.891 FORMER SMOKER: ICD-10-CM

## 2017-06-06 PROCEDURE — 94727 GAS DIL/WSHOT DETER LNG VOL: CPT | Mod: 26,S$PBB,, | Performed by: INTERNAL MEDICINE

## 2017-06-06 PROCEDURE — 3045F PR MOST RECENT HEMOGLOBIN A1C LEVEL 7.0-9.0%: CPT | Mod: ,,, | Performed by: FAMILY MEDICINE

## 2017-06-06 PROCEDURE — 99213 OFFICE O/P EST LOW 20 MIN: CPT | Mod: PBBFAC,25,27 | Performed by: FAMILY MEDICINE

## 2017-06-06 PROCEDURE — 99999 PR PBB SHADOW E&M-EST. PATIENT-LVL V: CPT | Mod: PBBFAC,,, | Performed by: INTERNAL MEDICINE

## 2017-06-06 PROCEDURE — 99999 PR PBB SHADOW E&M-EST. PATIENT-LVL III: CPT | Mod: PBBFAC,,, | Performed by: FAMILY MEDICINE

## 2017-06-06 PROCEDURE — 99214 OFFICE O/P EST MOD 30 MIN: CPT | Mod: S$PBB,,, | Performed by: FAMILY MEDICINE

## 2017-06-06 PROCEDURE — 4010F ACE/ARB THERAPY RXD/TAKEN: CPT | Mod: ,,, | Performed by: FAMILY MEDICINE

## 2017-06-06 PROCEDURE — 94727 GAS DIL/WSHOT DETER LNG VOL: CPT | Mod: PBBFAC | Performed by: INTERNAL MEDICINE

## 2017-06-06 PROCEDURE — 99244 OFF/OP CNSLTJ NEW/EST MOD 40: CPT | Mod: 25,S$PBB,, | Performed by: INTERNAL MEDICINE

## 2017-06-06 PROCEDURE — 99215 OFFICE O/P EST HI 40 MIN: CPT | Mod: PBBFAC | Performed by: INTERNAL MEDICINE

## 2017-06-06 RX ORDER — ATORVASTATIN CALCIUM 20 MG/1
20 TABLET, FILM COATED ORAL EVERY MORNING
Qty: 90 TABLET | Refills: 1 | Status: SHIPPED | OUTPATIENT
Start: 2017-06-06 | End: 2018-01-16 | Stop reason: SDUPTHER

## 2017-06-06 RX ORDER — LISINOPRIL 10 MG/1
10 TABLET ORAL DAILY
Qty: 90 TABLET | Refills: 1 | Status: SHIPPED | OUTPATIENT
Start: 2017-06-06 | End: 2017-12-11 | Stop reason: SDUPTHER

## 2017-06-06 RX ORDER — AMLODIPINE BESYLATE 10 MG/1
10 TABLET ORAL EVERY MORNING
Qty: 90 TABLET | Refills: 1 | Status: SHIPPED | OUTPATIENT
Start: 2017-06-06 | End: 2017-12-29 | Stop reason: SDUPTHER

## 2017-06-06 RX ORDER — LORATADINE 10 MG/1
10 TABLET ORAL DAILY
Qty: 90 TABLET | Refills: 1 | Status: SHIPPED | OUTPATIENT
Start: 2017-06-06 | End: 2017-12-11 | Stop reason: SDUPTHER

## 2017-06-06 RX ORDER — METFORMIN HYDROCHLORIDE 1000 MG/1
1000 TABLET ORAL 2 TIMES DAILY WITH MEALS
Qty: 180 TABLET | Refills: 1 | Status: SHIPPED | OUTPATIENT
Start: 2017-06-06 | End: 2018-01-09 | Stop reason: SDUPTHER

## 2017-06-06 NOTE — PROGRESS NOTES
"Subjective:       Patient ID: Caesar Arevalo is a 66 y.o. male.  Consult placed by: Dr. Yan  Reason for the consult: Restrictive lung disease  Chief Complaint: Consult    66 year old male with HTN, DM type 2 and back pain presents for evaluation of dyspnea on exertion and abnormal PFTs. Patient states that he has been told he "breaths" heavy. Patient reports snoring and multiple nighttime awakenings. Patient denies fever, chills, unintentional weight loss.   Former smoker.   Remote hx of latent TB. He was treated per patient report.     Spirometry showed decreased FVC suggesting restriction.       Review of Systems   Constitutional: Negative for weight loss, activity change and appetite change.   HENT: Negative for postnasal drip, rhinorrhea and congestion.    Respiratory: Positive for apnea, shortness of breath and dyspnea on extertion. Negative for snoring and sputum production.    Cardiovascular: Negative for chest pain and leg swelling.   Genitourinary: Negative for difficulty urinating and hematuria.   Endocrine: Negative for cold intolerance and heat intolerance.    Musculoskeletal: Positive for back pain.   Skin: Negative for rash.   Gastrointestinal: Negative for nausea and vomiting.   Neurological: Negative for syncope and weakness.   Psychiatric/Behavioral: Negative for confusion. The patient is not nervous/anxious.        Past Medical History:   Diagnosis Date    Bell's palsy     1980's    Diabetes mellitus, type 2     Hypertension     TB lung, latent      Social History     Social History    Marital status:      Spouse name: N/A    Number of children: 4    Years of education: N/A     Occupational History    retired [employer]      Social History Main Topics    Smoking status: Former Smoker     Packs/day: 1.00     Years: 10.00     Quit date: 9/23/2011    Smokeless tobacco: Never Used    Alcohol use 0.0 oz/week      Comment: occasionally-     Drug use: No    Sexual activity: Not on " "file     Other Topics Concern    Not on file     Social History Narrative    No narrative on file     Family History   Problem Relation Age of Onset    Diabetes Mother     Hypertension Mother     Heart disease Mother     Diabetes Sister     Cancer Neg Hx     Stroke Neg Hx          Objective:       Vitals:    06/06/17 1327   BP: (!) 150/84   Pulse: 102   SpO2: 96%   Weight: 114.3 kg (251 lb 15.8 oz)   Height: 5' 11" (1.803 m)   PainSc: 0-No pain       Physical Exam   Constitutional: He is oriented to person, place, and time. He appears well-developed and well-nourished. No distress.   HENT:   Head: Normocephalic.   Nose: Nose normal.   Neck: Normal range of motion. Neck supple.   Cardiovascular: Normal rate and regular rhythm.    Pulmonary/Chest: Normal expansion and breath sounds normal. He has no wheezes. He has no rhonchi.   Abdominal: Soft. Bowel sounds are normal.   Musculoskeletal: Normal range of motion. He exhibits no edema or deformity.   Neurological: He is alert and oriented to person, place, and time.   Skin: Skin is warm and dry. No rash noted. He is not diaphoretic. No erythema.   Psychiatric: He has a normal mood and affect. His behavior is normal.   Nursing note and vitals reviewed.    Personal Diagnostic Review  Pulmonary function tests: Mild restriction  No flowsheet data found.      Assessment:       1. TERRY (dyspnea on exertion)        Outpatient Encounter Prescriptions as of 6/6/2017   Medication Sig Dispense Refill    amlodipine (NORVASC) 10 MG tablet Take 1 tablet (10 mg total) by mouth every morning. 90 tablet 1    atorvastatin (LIPITOR) 20 MG tablet Take 1 tablet (20 mg total) by mouth every morning. 90 tablet 1    blood sugar diagnostic Strp 1 strip by Misc.(Non-Drug; Combo Route) route 3 (three) times daily. freestyle 300 strip 6    fluticasone (FLONASE) 50 mcg/actuation nasal spray 1 spray by Each Nare route once daily.      FREESTYLE LANCETS 28 gauge lancets       linagliptin " (TRADJENTA) 5 mg Tab tablet Take 1 tablet (5 mg total) by mouth once daily. 90 tablet 1    lisinopril 10 MG tablet Take 1 tablet (10 mg total) by mouth once daily. 90 tablet 1    loratadine (CLARITIN) 10 mg tablet Take 1 tablet (10 mg total) by mouth once daily. 90 tablet 1    metformin (GLUCOPHAGE) 1000 MG tablet Take 1 tablet (1,000 mg total) by mouth 2 (two) times daily with meals. 180 tablet 1    [DISCONTINUED] amlodipine (NORVASC) 10 MG tablet Take 1 tablet (10 mg total) by mouth every morning. 90 tablet 1    [DISCONTINUED] atorvastatin (LIPITOR) 20 MG tablet Take 1 tablet (20 mg total) by mouth every morning. 90 tablet 1    [DISCONTINUED] linagliptin (TRADJENTA) 5 mg Tab tablet Take 1 tablet (5 mg total) by mouth once daily. 90 tablet 1    [DISCONTINUED] lisinopril 10 MG tablet Take 1 tablet (10 mg total) by mouth once daily. 90 tablet 1    [DISCONTINUED] loratadine (CLARITIN) 10 mg tablet Take 1 tablet (10 mg total) by mouth once daily. (Patient taking differently: Take 10 mg by mouth every morning. ) 30 tablet 0    [DISCONTINUED] meloxicam (MOBIC) 7.5 MG tablet Take 1 tablet (7.5 mg total) by mouth once daily. 30 tablet 4    [DISCONTINUED] metformin (GLUCOPHAGE) 1000 MG tablet Take 1 tablet (1,000 mg total) by mouth 2 (two) times daily with meals. 180 tablet 1     No facility-administered encounter medications on file as of 6/6/2017.      Orders Placed This Encounter   Procedures    Ambulatory consult to Sleep Disorders     Referral Priority:   Routine     Referral Type:   Consultation     Number of Visits Requested:   1    2D echo with color flow doppler     Standing Status:   Future     Standing Expiration Date:   6/6/2018    LUNG VOLUMES     Standing Status:   Future     Number of Occurrences:   1     Standing Expiration Date:   6/6/2018     Plan:       66 year old male with     TERRY- Patient's TERRY is likely multifactorial. Most likely causes are deconditioning and possible RODERICK. Patient also  has some restriction on PFTs. TLC(70% of predicted). CXR without indication of ILD. Restriction can be due to obesity and RODERICK. Will evaluate these findings prior to evaluating for ILD(seems less likely).  -Sleep clinic referral  -Lung volumes performed today(TLC suggests mild restriction)  -2d echo  -Exercise and Weight loss counseling    Follow up in 3 months.     Thank you for allowing me to participate in Mr. Arevalo's care.     Katie Dixon MD

## 2017-06-06 NOTE — PROGRESS NOTES
"Subjective:       Patient ID: Caesar Arevalo is a 66 y.o. male.    Chief Complaint: Follow-up (HTN, DM)    HPI 67y/o with DM2, HTN, Vit D def, he is post op ACDF on 10/11/16 his neck has improved overall, CLBP,is here for follow up. He is not doing any exercise.  He had a rash on his abdomen for a week, it was itchy, not painful, he brought a picture on his phone of what it looked like, but its hard to tell  DM2: a1c improved to 8.6 from 8.9, on metformin, trajenta, lisinopril, statin, he is eating a lot of sweets to deal with his stress, he is resistant to doing any injection  Urologist Dr. Parkinson  Reports he occasionally feels sob at rest or with activy, he can ride his bike at times without a problem, denies f/n/v/d/constipation/cp/palpitations/urinary sx.     Review of Systems   Constitutional: Negative for activity change, appetite change, fatigue and fever.   Respiratory: Negative for cough and shortness of breath.    Cardiovascular: Negative for chest pain, palpitations and leg swelling.   Gastrointestinal: Negative for constipation, diarrhea, nausea and vomiting.   Genitourinary: Negative for difficulty urinating and dysuria.   Skin: Negative for rash.   Neurological: Negative for dizziness and light-headedness.   Psychiatric/Behavioral: Negative for sleep disturbance.       Objective:      /88   Pulse 88   Ht 5' 11" (1.803 m)   Wt 113.2 kg (249 lb 7.2 oz)   BMI 34.79 kg/m²   Physical Exam   Constitutional: He appears well-developed and well-nourished.   HENT:   Head: Normocephalic and atraumatic.   Mouth/Throat: No oropharyngeal exudate.   Neck: Normal range of motion. Neck supple. No thyromegaly present.   Cardiovascular: Normal rate, regular rhythm and normal heart sounds.    Pulmonary/Chest: Effort normal and breath sounds normal. No respiratory distress.   Musculoskeletal: He exhibits no edema.   Lymphadenopathy:     He has no cervical adenopathy.   Neurological: He is alert.   Skin: " Skin is warm and dry.   Psychiatric: He has a normal mood and affect.   Nursing note and vitals reviewed.      Assessment:       1. Essential hypertension    2. Restrictive lung disease    3. Former smoker    4. Type 2 diabetes mellitus with diabetic neuropathy, without long-term current use of insulin    5. Hyperlipidemia, unspecified hyperlipidemia type    6. Allergic rhinitis, unspecified allergic rhinitis trigger, unspecified rhinitis seasonality        Plan:   Caesar was seen today for follow-up.    Diagnoses and all orders for this visit:    Essential hypertension  -     lisinopril 10 MG tablet; Take 1 tablet (10 mg total) by mouth once daily.  -     amlodipine (NORVASC) 10 MG tablet; Take 1 tablet (10 mg total) by mouth every morning.    Restrictive lung disease  -     Ambulatory Referral to Pulmonology    Former smoker  -     Ambulatory Referral to Pulmonology    Type 2 diabetes mellitus with diabetic neuropathy, without long-term current use of insulin  -     Hemoglobin A1c; Future  -     metformin (GLUCOPHAGE) 1000 MG tablet; Take 1 tablet (1,000 mg total) by mouth 2 (two) times daily with meals.  -     linagliptin (TRADJENTA) 5 mg Tab tablet; Take 1 tablet (5 mg total) by mouth once daily.  -     atorvastatin (LIPITOR) 20 MG tablet; Take 1 tablet (20 mg total) by mouth every morning.    Hyperlipidemia, unspecified hyperlipidemia type  -     atorvastatin (LIPITOR) 20 MG tablet; Take 1 tablet (20 mg total) by mouth every morning.    Allergic rhinitis, unspecified allergic rhinitis trigger, unspecified rhinitis seasonality  -     loratadine (CLARITIN) 10 mg tablet; Take 1 tablet (10 mg total) by mouth once daily.

## 2017-06-06 NOTE — LETTER
June 6, 2017      Sophie Yan MD  [address]  [place] [address]           [facility]  [address]  [place] [address]  Phone: [phone]          Patient: Caesar Arevalo   MR Number: [id number]   YOB: 1950   Date of Visit: 6/6/2017       Dear Dr. Sophie Yan:    Thank you for referring Caesar Arevalo to me for evaluation. Attached you will find relevant portions of my assessment and plan of care.    If you have questions, please do not hesitate to call me. I look forward to following Caesar Arevalo along with you.    Sincerely,    Katie Dixon MD    Enclosure  CC:  No Recipients    If you would like to receive this communication electronically, please contact [email or web address] or [phone] to request more information on [employer] access.    For providers and/or their staff who would like to refer a patient to [facility], please contact us through our one-stop-shop provider referral line, [facility], at [phone].    If you feel you have received this communication in error or would no longer like to receive these types of communications, please e-mail [email or web address]

## 2017-06-07 ENCOUNTER — TELEPHONE (OUTPATIENT)
Dept: PODIATRY | Facility: CLINIC | Age: 67
End: 2017-06-07

## 2017-06-07 ENCOUNTER — PATIENT MESSAGE (OUTPATIENT)
Dept: INTERNAL MEDICINE | Facility: CLINIC | Age: 67
End: 2017-06-07

## 2017-06-07 NOTE — TELEPHONE ENCOUNTER
Called left voice mail of  appt schedule with dr. Man on 6/22/2017 @ 1015am  If have any question too please call clinic  At [phone]

## 2017-06-12 ENCOUNTER — PATIENT MESSAGE (OUTPATIENT)
Dept: INTERNAL MEDICINE | Facility: CLINIC | Age: 67
End: 2017-06-12

## 2017-06-12 ENCOUNTER — OFFICE VISIT (OUTPATIENT)
Dept: INTERNAL MEDICINE | Facility: CLINIC | Age: 67
End: 2017-06-12
Payer: OTHER GOVERNMENT

## 2017-06-12 VITALS
HEART RATE: 106 BPM | OXYGEN SATURATION: 94 % | WEIGHT: 253.31 LBS | BODY MASS INDEX: 35.46 KG/M2 | DIASTOLIC BLOOD PRESSURE: 76 MMHG | SYSTOLIC BLOOD PRESSURE: 128 MMHG | HEIGHT: 71 IN

## 2017-06-12 DIAGNOSIS — L02.415 ABSCESS OF LEG, RIGHT: Primary | ICD-10-CM

## 2017-06-12 PROCEDURE — 99999 PR PBB SHADOW E&M-EST. PATIENT-LVL III: CPT | Mod: PBBFAC,,, | Performed by: FAMILY MEDICINE

## 2017-06-12 PROCEDURE — 99213 OFFICE O/P EST LOW 20 MIN: CPT | Mod: S$PBB,,, | Performed by: FAMILY MEDICINE

## 2017-06-12 PROCEDURE — 99213 OFFICE O/P EST LOW 20 MIN: CPT | Mod: PBBFAC | Performed by: FAMILY MEDICINE

## 2017-06-12 RX ORDER — SULFAMETHOXAZOLE AND TRIMETHOPRIM 800; 160 MG/1; MG/1
1 TABLET ORAL 2 TIMES DAILY
Qty: 14 TABLET | Refills: 0 | Status: SHIPPED | OUTPATIENT
Start: 2017-06-12 | End: 2017-06-19

## 2017-06-12 NOTE — TELEPHONE ENCOUNTER
Spoke with pt. Pt states he thinks he got bitten by a spider on the back of his thigh. Wound had a yellowish discharge. It has been there for a few days and is bothersome.  U/C apt made for 06/12/2017 @3 pm with Dr. Yan.

## 2017-06-12 NOTE — PROGRESS NOTES
"Subjective:       Patient ID: Caesar Arevalo is a 66 y.o. male.    Chief Complaint: Abscess (back of right leg)    HPI 67y/o with DM2, HTN, Vit D def, CLBP,is here with leg abscess.  He says last Thursday he woke up with a sore feeling in the back of his R upper thigh, it has drained yellow fluid, feels sore. Denies f/n/v. No recent antibiotic use.            Review of Systems   Constitutional: Negative for activity change, appetite change and fever.   Gastrointestinal: Negative for nausea and vomiting.   Skin:        Soreness with draining yellow fluid       Objective:      /76   Pulse 106   Ht 5' 11" (1.803 m)   Wt 114.9 kg (253 lb 4.8 oz)   SpO2 (!) 94%   BMI 35.33 kg/m²   Physical Exam   Constitutional: He appears well-developed and well-nourished.   HENT:   Head: Normocephalic and atraumatic.   Mouth/Throat: No oropharyngeal exudate.   Neck: No thyromegaly present.   Pulmonary/Chest: No respiratory distress.   Musculoskeletal: He exhibits no edema.   Lymphadenopathy:     He has no cervical adenopathy.   Neurological: He is alert.   Skin: Skin is warm and dry. There is erythema.   R posterior upper thigh with abscess with some surrounding erythema mild induration and central scab   Psychiatric: He has a normal mood and affect.   Nursing note and vitals reviewed.      Assessment:       1. Abscess of leg, right        Plan:   Caesar was seen today for abscess.    Diagnoses and all orders for this visit:    Abscess of leg, right  -     sulfamethoxazole-trimethoprim 800-160mg (BACTRIM DS) 800-160 mg Tab; Take 1 tablet by mouth 2 (two) times daily.      "

## 2017-06-12 NOTE — PATIENT INSTRUCTIONS
Abscess (Antibiotic Treatment Only)  An abscess (sometimes called a boil) happens when bacteria get trapped under the skin and start to grow. Pus forms inside the abscess as the body responds to the bacteria. An abscess can happen with an insect bite, ingrown hair, blocked oil gland, pimple, cyst, or puncture wound.  In the early stages, your wound may be red and tender. For this stage, you may get antibiotics. If the abscess does not get better with antibiotics, it will need to be drained with a small cut.  Home care  These tips will help you care for your abscess at home:  · Soak the wound in hot water or apply hot packs (small towel soaked in hot water) to the area for 20 minutes at a time. Do this 3 to 4 times a day.  · Do not cut, squeeze, or pop the boil yourself.  · Apply antibiotic cream or ointment to the skin 3 to 4 times a day, unless something else was prescribed. Some ointments include an antibiotic plus a pain reliever.  · If your doctor prescribed antibiotics, do not stop taking them until you have finished the medicine or the doctor tells you to stop.  · You may use an over-the-counter pain medicine to control pain, unless another pain medicine was prescribed. If you have chronic liver or kidney disease or ever had a stomach ulcer or gastrointestinal bleeding, talk with your doctor before using these any of these.  Follow-up care  Follow up with your healthcare provider, or as advised. Check your wound each day for the signs of worsening infection listed below.  When to seek medical advice  Get prompt medical attention if any of these occur:  · An increase in redness or swelling  · Red streaks in the skin leading away from the abscess  · An increase in local pain or swelling  · Fever of 100.4ºF (38ºC) or higher, or as directed by your healthcare provider  · Pus or fluid coming from the abscess  · Boil returns after getting better  Date Last Reviewed: 9/1/2016  © 9662-7170 [employer] [address] [place] [address]. All rights reserved. This information is not intended as a substitute for professional medical care. Always follow your healthcare professional's instructions.

## 2017-06-13 ENCOUNTER — HOSPITAL ENCOUNTER (OUTPATIENT)
Dept: CARDIOLOGY | Facility: CLINIC | Age: 67
Discharge: HOME | End: 2017-06-13
Payer: OTHER GOVERNMENT

## 2017-06-13 DIAGNOSIS — R06.09 DOE (DYSPNEA ON EXERTION): ICD-10-CM

## 2017-06-13 LAB
DIASTOLIC DYSFUNCTION: NO
ESTIMATED PA SYSTOLIC PRESSURE: 14.44
MITRAL VALVE MOBILITY: NORMAL
RETIRED EF AND QEF - SEE NOTES: 60 (ref 55–65)

## 2017-06-13 PROCEDURE — 93306 TTE W/DOPPLER COMPLETE: CPT | Mod: PBBFAC | Performed by: INTERNAL MEDICINE

## 2017-06-15 ENCOUNTER — OFFICE VISIT (OUTPATIENT)
Dept: INTERNAL MEDICINE | Facility: CLINIC | Age: 67
End: 2017-06-15
Payer: OTHER GOVERNMENT

## 2017-06-15 VITALS
BODY MASS INDEX: 35.46 KG/M2 | DIASTOLIC BLOOD PRESSURE: 82 MMHG | HEIGHT: 71 IN | OXYGEN SATURATION: 95 % | HEART RATE: 95 BPM | TEMPERATURE: 98 F | WEIGHT: 253.31 LBS | SYSTOLIC BLOOD PRESSURE: 124 MMHG

## 2017-06-15 DIAGNOSIS — L02.91 ABSCESS: Primary | ICD-10-CM

## 2017-06-15 PROCEDURE — 99999 PR PBB SHADOW E&M-EST. PATIENT-LVL IV: CPT | Mod: PBBFAC,,, | Performed by: NURSE PRACTITIONER

## 2017-06-15 PROCEDURE — 99214 OFFICE O/P EST MOD 30 MIN: CPT | Mod: PBBFAC | Performed by: NURSE PRACTITIONER

## 2017-06-15 PROCEDURE — 99213 OFFICE O/P EST LOW 20 MIN: CPT | Mod: S$PBB,,, | Performed by: NURSE PRACTITIONER

## 2017-06-15 RX ORDER — MUPIROCIN 20 MG/G
OINTMENT TOPICAL
Qty: 30 G | Refills: 0 | Status: SHIPPED | OUTPATIENT
Start: 2017-06-15 | End: 2018-01-16

## 2017-06-15 NOTE — PATIENT INSTRUCTIONS
Shower with hibiclens each day x7d then once a week thereafter to suppress staph infections.      Abscess (Antibiotic Treatment Only)  An abscess (sometimes called a boil) happens when bacteria get trapped under the skin and start to grow. Pus forms inside the abscess as the body responds to the bacteria. An abscess can happen with an insect bite, ingrown hair, blocked oil gland, pimple, cyst, or puncture wound.  In the early stages, your wound may be red and tender. For this stage, you may get antibiotics. If the abscess does not get better with antibiotics, it will need to be drained with a small cut.  Home care  These tips will help you care for your abscess at home:  · Soak the wound in hot water or apply hot packs (small towel soaked in hot water) to the area for 20 minutes at a time. Do this 3 to 4 times a day.  · Do not cut, squeeze, or pop the boil yourself.  · Apply antibiotic cream or ointment to the skin 3 to 4 times a day, unless something else was prescribed. Some ointments include an antibiotic plus a pain reliever.  · If your doctor prescribed antibiotics, do not stop taking them until you have finished the medicine or the doctor tells you to stop.  · You may use an over-the-counter pain medicine to control pain, unless another pain medicine was prescribed. If you have chronic liver or kidney disease or ever had a stomach ulcer or gastrointestinal bleeding, talk with your doctor before using these any of these.  Follow-up care  Follow up with your healthcare provider, or as advised. Check your wound each day for the signs of worsening infection listed below.  When to seek medical advice  Get prompt medical attention if any of these occur:  · An increase in redness or swelling  · Red streaks in the skin leading away from the abscess  · An increase in local pain or swelling  · Fever of 100.4ºF (38ºC) or higher, or as directed by your healthcare provider  · Pus or fluid coming from the abscess  · Boil  returns after getting better  Date Last Reviewed: 9/1/2016  © 4555-8286 [employer]. [address] [place] [address], [place] [address]. All rights reserved. This information is not intended as a substitute for professional medical care. Always follow your healthcare professional's instructions.

## 2017-06-15 NOTE — PROGRESS NOTES
Subjective:       Patient ID: Caesar Arevalo is a 66 y.o. male.    Chief Complaint: No chief complaint on file.    Abscess   Chronicity:  New  Abscess location: right buttock.  Associated Symptoms: no fever, no chills, no sweats  Characteristics: painful and redness    Treatments Tried:  Oral antibiotics    Review of Systems   Constitutional: Negative for activity change, chills, fever and unexpected weight change.   HENT: Negative for hearing loss, rhinorrhea and trouble swallowing.    Eyes: Negative for discharge and visual disturbance.   Respiratory: Negative for chest tightness and wheezing.    Cardiovascular: Negative for chest pain and palpitations.   Gastrointestinal: Negative for blood in stool, constipation, diarrhea and vomiting.   Endocrine: Negative for polydipsia and polyuria.   Genitourinary: Negative for difficulty urinating, hematuria and urgency.   Musculoskeletal: Negative for arthralgias, joint swelling and neck pain.   Skin:        Abscess to the right buttock   Neurological: Negative for weakness and headaches.   Psychiatric/Behavioral: Negative for confusion and dysphoric mood.       Objective:      Physical Exam   Constitutional: He is oriented to person, place, and time. He appears well-developed and well-nourished.   HENT:   Head: Normocephalic and atraumatic.   Eyes: Pupils are equal, round, and reactive to light.   Neck: Normal range of motion.   Pulmonary/Chest: Effort normal. No respiratory distress.   Abdominal: He exhibits no distension.   Musculoskeletal: Normal range of motion.   Neurological: He is alert and oriented to person, place, and time.   Skin: Skin is warm and dry.            Assessment:       1. Abscess        Plan:       Diagnoses and all orders for this visit:    Abscess    Other orders  -     mupirocin (BACTROBAN) 2 % ointment; Apply tid to any forming abscesses, also bid to bilateral nares x5d        Pt has been given instructions populated from [employer] database and  "has verbalized understanding of the after visit summary and information contained wherein.    Follow up with a primary care provider. May go to ER for acute shortness of breath, lightheadedness, fever, or any other emergent complaints or changes in condition.    "This note will be shared with the patient"    The [employer] medical Dictation software was used during the dictation of portions or the entirety of this medical record.  Phonetic or grammatic errors may exist due to the use of this software. For clarification, refer to the author of the document.             "

## 2017-06-16 ENCOUNTER — TELEPHONE (OUTPATIENT)
Dept: INTERNAL MEDICINE | Facility: CLINIC | Age: 67
End: 2017-06-16

## 2017-06-16 NOTE — TELEPHONE ENCOUNTER
----- Message from Sophie Yan MD sent at 6/6/2017 11:11 AM CDT -----  He tends to eat sweets when he is stressed, he does have life stress and would like some options, please help

## 2017-06-16 NOTE — TELEPHONE ENCOUNTER
..Called patient left message, intro self, referred by Dr. Yan.   Request call back at direct number [phone].  Leyla Mock RN HC

## 2017-06-22 ENCOUNTER — OFFICE VISIT (OUTPATIENT)
Dept: PODIATRY | Facility: CLINIC | Age: 67
End: 2017-06-22
Payer: OTHER GOVERNMENT

## 2017-06-22 VITALS
HEART RATE: 101 BPM | WEIGHT: 255 LBS | BODY MASS INDEX: 35.7 KG/M2 | SYSTOLIC BLOOD PRESSURE: 147 MMHG | RESPIRATION RATE: 19 BRPM | DIASTOLIC BLOOD PRESSURE: 95 MMHG | HEIGHT: 71 IN

## 2017-06-22 DIAGNOSIS — E11.49 TYPE II DIABETES MELLITUS WITH NEUROLOGICAL MANIFESTATIONS: Primary | ICD-10-CM

## 2017-06-22 DIAGNOSIS — B35.3 TINEA PEDIS OF BOTH FEET: ICD-10-CM

## 2017-06-22 PROCEDURE — 99213 OFFICE O/P EST LOW 20 MIN: CPT | Mod: S$PBB,,, | Performed by: PODIATRIST

## 2017-06-22 PROCEDURE — 99213 OFFICE O/P EST LOW 20 MIN: CPT | Mod: PBBFAC | Performed by: PODIATRIST

## 2017-06-22 PROCEDURE — 99999 PR PBB SHADOW E&M-EST. PATIENT-LVL III: CPT | Mod: PBBFAC,,, | Performed by: PODIATRIST

## 2017-06-22 PROCEDURE — 4010F ACE/ARB THERAPY RXD/TAKEN: CPT | Mod: S$PBB,,, | Performed by: PODIATRIST

## 2017-06-22 PROCEDURE — 3045F PR MOST RECENT HEMOGLOBIN A1C LEVEL 7.0-9.0%: CPT | Mod: S$PBB,,, | Performed by: PODIATRIST

## 2017-06-22 RX ORDER — NAFTIFINE HYDROCHLORIDE 20 MG/G
1 GEL TOPICAL DAILY
Qty: 60 G | Refills: 2 | Status: SHIPPED | OUTPATIENT
Start: 2017-06-22 | End: 2017-12-07 | Stop reason: SDUPTHER

## 2017-06-22 NOTE — PROGRESS NOTES
Subjective:      Patient ID: Caesar Arevalo is a 66 y.o. male.    Chief Complaint: PCP (Vinicius Ford DNP 6/15/17); Diabetic Foot Exam; and Foot Problem (Fungus )    Caesar is a 66 y.o. male who presents to the clinic upon referral from Dr. Marlee goyal. provider found  for evaluation and treatment of diabetic feet. Caesar has a past medical history of Bell's palsy; Diabetes mellitus, type 2; Hypertension; and TB lung, latent. Patient relates no major problem with feet. Only complaints today consist of yearly DM foot examination  .    PCP: Sophie Yan MD    Date Last Seen by PCP:   Chief Complaint   Patient presents with    PCP     Vinicius Ford DNP 6/15/17    Diabetic Foot Exam    Foot Problem     Fungus          Current shoe gear: Casual shoes    Hemoglobin A1C   Date Value Ref Range Status   05/30/2017 8.6 (H) 4.5 - 6.2 % Final     Comment:     According to ADA guidelines, hemoglobin A1C <7.0% represents  optimal control in non-pregnant diabetic patients.  Different  metrics may apply to specific populations.   Standards of Medical Care in Diabetes - 2016.  For the purpose of screening for the presence of diabetes:  <5.7%     Consistent with the absence of diabetes  5.7-6.4%  Consistent with increasing risk for diabetes   (prediabetes)  >or=6.5%  Consistent with diabetes  Currently no consensus exists for use of hemoglobin A1C  for diagnosis of diabetes for children.     01/25/2017 8.9 (H) 4.5 - 6.2 % Final     Comment:     According to ADA guidelines, hemoglobin A1C <7.0% represents  optimal control in non-pregnant diabetic patients.  Different  metrics may apply to specific populations.   Standards of Medical Care in Diabetes - 2016.  For the purpose of screening for the presence of diabetes:  <5.7%     Consistent with the absence of diabetes  5.7-6.4%  Consistent with increasing risk for diabetes   (prediabetes)  >or=6.5%  Consistent with diabetes  Currently no consensus exists for use of  hemoglobin A1C  for diagnosis of diabetes for children.     10/25/2016 8.8 (H) 4.5 - 6.2 % Final     Comment:     According to ADA guidelines, hemoglobin A1C <7.0% represents  optimal control in non-pregnant diabetic patients.  Different  metrics may apply to specific populations.   Standards of Medical Care in Diabetes - 2016.  For the purpose of screening for the presence of diabetes:  <5.7%     Consistent with the absence of diabetes  5.7-6.4%  Consistent with increasing risk for diabetes   (prediabetes)  >or=6.5%  Consistent with diabetes  Currently no consensus exists for use of hemoglobin A1C  for diagnosis of diabetes for children.             Review of Systems   Constitution: Negative for chills, decreased appetite and fever.   Cardiovascular: Negative for leg swelling.   Musculoskeletal: Negative for arthritis, joint pain, joint swelling and myalgias.   Gastrointestinal: Negative for nausea and vomiting.   Neurological: Negative for loss of balance, numbness and paresthesias.           Objective:      Physical Exam   Constitutional: He is oriented to person, place, and time. He appears well-developed and well-nourished.   Cardiovascular: Intact distal pulses.    Pulses:       Dorsalis pedis pulses are 2+ on the right side, and 2+ on the left side.        Posterior tibial pulses are 2+ on the right side, and 2+ on the left side.   dorsalis pedis and posterior tibial pulses are palpable bilaterally. Capillary refill time is within normal limits. + pedal hair growth          Musculoskeletal: Normal range of motion. He exhibits no edema or tenderness.   Adequate joint range of motion without pain, limitation, nor crepitation Bilateral feet and ankle joints. Muscle strength is 5/5 in all groups bilaterally.         Feet:   Right Foot:   Protective Sensation: 10 sites tested. 10 sites sensed.   Left Foot:   Protective Sensation: 10 sites tested. 10 sites sensed.   Neurological: He is alert and oriented to person,  place, and time. He has normal strength. No sensory deficit.   [place]-Fly 5.07 monofilament is intact bilateral feet.      Skin: Skin is warm, dry and intact. No lesion and no rash noted. No erythema.   Interdigital maceration 1-4 b/l    Psychiatric: He has a normal mood and affect. His behavior is normal.   Vitals reviewed.            Assessment:       Encounter Diagnoses   Name Primary?    Type II diabetes mellitus with neurological manifestations Yes    Tinea pedis of both feet          Plan:       Caesar was seen today for pcp, diabetic foot exam and foot problem.    Diagnoses and all orders for this visit:    Type II diabetes mellitus with neurological manifestations    Tinea pedis of both feet    Other orders  -     naftifine 2 % Gel; Apply 1 application topically once daily.      I counseled the patient on his conditions, their implications and medical management.      - Shoe inspection. Diabetic Foot Education. Patient reminded of the importance of good nutrition and blood sugar control to help prevent podiatric complications of diabetes. Patient instructed on proper foot hygeine. We discussed wearing proper shoe gear, daily foot inspections, never walking without protective shoe gear, caution putting sharp instruments to feet     - Discussed DM foot care:  Wear comfortable, proper fitting shoes. Wash feet daily. Dry well. After drying, apply moisturizer to feet (no lotion to webspaces). Inspect feet daily for skin breaks, blisters, swelling, or redness. Wear cotton socks (preferably white)  Change socks every day. Do NOT walk barefoot. Do NOT use heating pads or warm/hot water soaks     - Discussed importance of daily moisturizer to the feet such as Gold bonds diabetic foot cream    - Patient is low risk for developing lower extremity issues secondary to diabetes    - RTC in  1 year for a diabetic foot exam  or sooner if problems    .

## 2017-06-26 ENCOUNTER — OFFICE VISIT (OUTPATIENT)
Dept: PULMONOLOGY | Facility: CLINIC | Age: 67
End: 2017-06-26
Payer: OTHER GOVERNMENT

## 2017-06-26 VITALS
HEART RATE: 105 BPM | SYSTOLIC BLOOD PRESSURE: 144 MMHG | BODY MASS INDEX: 35.7 KG/M2 | HEIGHT: 71 IN | OXYGEN SATURATION: 97 % | WEIGHT: 255 LBS | DIASTOLIC BLOOD PRESSURE: 90 MMHG

## 2017-06-26 DIAGNOSIS — G47.33 OSA (OBSTRUCTIVE SLEEP APNEA): Primary | ICD-10-CM

## 2017-06-26 DIAGNOSIS — E66.01 MORBID OBESITY DUE TO EXCESS CALORIES: ICD-10-CM

## 2017-06-26 PROCEDURE — 99214 OFFICE O/P EST MOD 30 MIN: CPT | Mod: PBBFAC | Performed by: INTERNAL MEDICINE

## 2017-06-26 PROCEDURE — 99204 OFFICE O/P NEW MOD 45 MIN: CPT | Mod: S$PBB,,, | Performed by: INTERNAL MEDICINE

## 2017-06-26 PROCEDURE — 99999 PR PBB SHADOW E&M-EST. PATIENT-LVL IV: CPT | Mod: PBBFAC,,, | Performed by: INTERNAL MEDICINE

## 2017-06-26 NOTE — PROGRESS NOTES
Caesar Arevalo  was seen as a new patient at the request of  Katie Dixon, * for the evaluation of  ashlee.    CHIEF COMPLAINT:    Chief Complaint   Patient presents with    Sleep Apnea       HISTORY OF PRESENT ILLNESS: Caesar Arevalo is a 66 y.o. male is here for sleep evaluation.   Patient with loud snoring.  No witnessed apnea.  Feeling rested upon awake.  No parasomnia.  No cataplexy.  No rls symptoms.      [place] Sleepiness Scale score during initial sleep evaluation was 1.    SLEEP ROUTINE:  Activity the hour prior to sleep: computer or watch tv    Bed partner:  Wife sometimes  Time to bed:  12 am to 2 am   Lights off:  tv is on timer   Sleep onset latency:  5 minutes        Disruptions or awakenings:    2-3 times for bathroom (no difficulty going back to sleep)    Wakeup time:      10 am   Perceived sleep quality:  Rested        Daytime naps:      none  Weekend sleep routine:      same  Caffeine use: 1-2 cups coffee in am   exercise habit:   Ride bike 3 times per week (`20 miles each time)      PAST MEDICAL HISTORY:    Active Ambulatory Problems     Diagnosis Date Noted    Type 2 diabetes mellitus with diabetic neuropathy, without long-term current use of insulin 03/23/2016    Essential hypertension 03/23/2016    Screening 07/25/2016    Chronic low back pain 07/28/2016    Cervical spondylosis 07/28/2016    Cervical radiculopathy 07/28/2016    Spondylosis of lumbar region without myelopathy or radiculopathy 07/28/2016    Type 2 diabetes mellitus without retinopathy 08/24/2016    Cervical stenosis of spinal canal 08/26/2016    RBC microcytosis 10/03/2016    Renal impairment 10/03/2016    Non morbid obesity 10/03/2016    Edema 10/03/2016    Left arm weakness 01/04/2017    Abnormal posture 01/04/2017    Lumbar facet arthropathy 02/01/2017    TB lung, latent 02/01/2017    Former smoker     TERRY (dyspnea on exertion)      Resolved Ambulatory Problems     Diagnosis Date Noted     Umbilical hernia 04/07/2016     Past Medical History:   Diagnosis Date    Bell's palsy     Diabetes mellitus, type 2     Hypertension     TB lung, latent 1980                PAST SURGICAL HISTORY:    Past Surgical History:   Procedure Laterality Date    COLONOSCOPY N/A 7/25/2016    Procedure: COLONOSCOPY;  Surgeon: RICARDO Mclain MD;  Location: [facility] ([address] [place] [address]);  Service: Endoscopy;  Laterality: N/A;    HERNIA REPAIR  04/2016    norris    KNEE LIGAMENT RECONSTRUCTION      plantar warts      PROSTATE SURGERY           FAMILY HISTORY:                Family History   Problem Relation Age of Onset    Diabetes Mother     Hypertension Mother     Heart disease Mother     Diabetes Sister     Cancer Neg Hx     Stroke Neg Hx        SOCIAL HISTORY:          Tobacco:   History   Smoking Status    Former Smoker    Packs/day: 1.00    Years: 30.00    Quit date: 9/23/2011   Smokeless Tobacco    Never Used       alcohol use:    History   Alcohol Use    0.0 oz/week     Comment: occasionally-                  Occupation:  Former     ALLERGIES:  Review of patient's allergies indicates:  No Known Allergies    CURRENT MEDICATIONS:    Current Outpatient Prescriptions   Medication Sig Dispense Refill    amlodipine (NORVASC) 10 MG tablet Take 1 tablet (10 mg total) by mouth every morning. 90 tablet 1    atorvastatin (LIPITOR) 20 MG tablet Take 1 tablet (20 mg total) by mouth every morning. 90 tablet 1    blood sugar diagnostic Strp 1 strip by Misc.(Non-Drug; Combo Route) route 3 (three) times daily. freestyle 300 strip 6    fluticasone (FLONASE) 50 mcg/actuation nasal spray 1 spray by Each Nare route once daily.      FREESTYLE LANCETS 28 gauge lancets       linagliptin (TRADJENTA) 5 mg Tab tablet Take 1 tablet (5 mg total) by mouth once daily. 90 tablet 1    lisinopril 10 MG tablet Take 1 tablet (10 mg total) by mouth once daily. 90 tablet 1    loratadine (CLARITIN) 10 mg tablet Take 1 tablet  "(10 mg total) by mouth once daily. 90 tablet 1    metformin (GLUCOPHAGE) 1000 MG tablet Take 1 tablet (1,000 mg total) by mouth 2 (two) times daily with meals. 180 tablet 1    mupirocin (BACTROBAN) 2 % ointment Apply tid to any forming abscesses, also bid to bilateral nares x5d 30 g 0    naftifine 2 % Gel Apply 1 application topically once daily. 60 g 2     No current facility-administered medications for this visit.                   REVIEW OF SYSTEMS:     Sleep related symptoms as per HPI.  CONST:Denies weight gain    HEENT: Denies sinus congestion  PULM: Denies dyspnea  CARD:  Denies palpitations   GI:  Denies acid reflux  : Denies polyuria  NEURO: Denies headaches  PSYCH: Denies mood disturbance  HEME: Denies anemia   Otherwise, a balance of systems reviewed is negative.          PHYSICAL EXAM:  Vitals:    06/26/17 1507   BP: (!) 144/90   Pulse: 105   SpO2: 97%   Weight: 115.7 kg (255 lb)   Height: 5' 11" (1.803 m)   PainSc: 0-No pain     Body mass index is 35.57 kg/m².     GENERAL: Normal development, well groomed  HEENT:  Conjunctivae are non-erythematous; Pupils equal, round, and reactive to light; Nose is symmetrical; Nasal mucosa is pink and moist; Septum is midline; Inferior turbinates are normal; Nasal airflow is normal; Posterior pharynx is pink; Modified Mallampati: 4; Posterior palate is normal; Tonsils +1; Uvula is normal and pink;Tongue is normal; Dentition is fair; No TMJ tenderness; Jaw opening and protrusion without click and without discomfort.  NECK: Supple. Neck circumference is 19.5 inches. No thyromegaly. No palpable nodes.     SKIN: On face and neck: No abrasions, no rashes, no lesions.  No subcutaneous nodules are palpable.  RESPIRATORY: Chest is clear to auscultation.  Normal chest expansion and non-labored breathing at rest.  CARDIOVASCULAR: Normal S1, S2.  No murmurs, gallops or rubs. No carotid bruits bilaterally.  EXTREMITIES: No edema. No clubbing. No cyanosis. Station normal. " Gait normal.        NEURO/PSYCH: Oriented to time, place and person. Normal attention span and concentration. Affect is full. Mood is normal.                                              DATA no prior sleep study  6/13/17    1 - Normal left ventricular systolic function (EF 60-65%).     2 - Concentric remodeling.     3 - No wall motion abnormalities.     4 - Normal left ventricular diastolic function.     5 - Normal right ventricular systolic function .     6 - The estimated PA systolic pressure is greater than 14 mmHg.   Lab Results   Component Value Date    TSH 1.255 05/30/2017     ASSESSMENT    ICD-10-CM ICD-9-CM    1. RODERICK (obstructive sleep apnea) G47.33 327.23 Polysomnogram (CPAP will be added if patient meets diagnostic criteria.)   2. Morbid obesity due to excess calories E66.01 278.01        PLAN:    Sleep Apnea NEC. The patient symptomatically has loud snoring with findings of enlarged neck, high grade mallampatti, htn, elevated bmi. This warrants further investigation for possible obstructive sleep apnea.  Patient will be contacted after sleep study is done.      Obesity - aware of need for weight loss.  Patient has cut back exercising due to back issue.  Encourage resuming exercise.      Diagnostic: Polysomnogram. The nature of this procedure and its indication was discussed with the patient.     Education: During our discussion today, we talked about the etiology of obstructive sleep apnea as well as the potential ramifications of untreated sleep apnea, which could include daytime sleepiness, hypertension, heart disease and/or stroke.     Precautions: The patient was advised to abstain from driving should they feel sleepy or drowsy.       Thank you for allowing me the opportunity to participate in the care of your patient.    Patient will Return after sleep study. with md/np.    Please cc note to  Katie Dixon, *.

## 2017-06-26 NOTE — PATIENT INSTRUCTIONS
Leidy or Jovani will contact you to schedule your sleep study. Their number is [phone] (ext 2). [facility] is located on 7th floor of the [facility].    We will call you when the sleep study results are ready - if you have not heard from us by 2 weeks from the date of the study, please call [phone] (ext 1).    You are advised to abstain from driving should you feel sleepy or drowsy.

## 2017-06-26 NOTE — LETTER
June 26, 2017      Katie Dxion MD  [address]  5th [address]  [place] [address]           [facility]  [address]  [place] [address]  Phone: [phone]          Patient: Caesar Arevalo   MR Number: [id number]   YOB: 1950   Date of Visit: 6/26/2017       Dear Dr. Katie Dixon:    Thank you for referring Caesar Arevalo to me for evaluation. Attached you will find relevant portions of my assessment and plan of care.    If you have questions, please do not hesitate to call me. I look forward to following Caesar Arevalo along with you.    Sincerely,    Zak Morin MD    Enclosure  CC:  No Recipients    If you would like to receive this communication electronically, please contact [email or web address] or [phone] to request more information on [employer] Link access.    For providers and/or their staff who would like to refer a patient to [facility], please contact us through our one-stop-shop provider referral line, [facility], at [phone].    If you feel you have received this communication in error or would no longer like to receive these types of communications, please e-mail [email or web address]

## 2017-07-12 ENCOUNTER — TELEPHONE (OUTPATIENT)
Dept: SLEEP MEDICINE | Facility: OTHER | Age: 67
End: 2017-07-12

## 2017-07-20 ENCOUNTER — TELEPHONE (OUTPATIENT)
Dept: SLEEP MEDICINE | Facility: OTHER | Age: 67
End: 2017-07-20

## 2017-07-21 ENCOUNTER — TELEPHONE (OUTPATIENT)
Dept: SLEEP MEDICINE | Facility: OTHER | Age: 67
End: 2017-07-21

## 2017-07-28 ENCOUNTER — OFFICE VISIT (OUTPATIENT)
Dept: INTERNAL MEDICINE | Facility: CLINIC | Age: 67
End: 2017-07-28
Payer: OTHER GOVERNMENT

## 2017-07-28 ENCOUNTER — PATIENT MESSAGE (OUTPATIENT)
Dept: INTERNAL MEDICINE | Facility: CLINIC | Age: 67
End: 2017-07-28

## 2017-07-28 ENCOUNTER — HOSPITAL ENCOUNTER (OUTPATIENT)
Dept: RADIOLOGY | Facility: HOSPITAL | Age: 67
Discharge: HOME | End: 2017-07-28
Attending: INTERNAL MEDICINE
Payer: OTHER GOVERNMENT

## 2017-07-28 VITALS
BODY MASS INDEX: 35.62 KG/M2 | HEIGHT: 71 IN | HEART RATE: 106 BPM | WEIGHT: 254.44 LBS | SYSTOLIC BLOOD PRESSURE: 132 MMHG | DIASTOLIC BLOOD PRESSURE: 92 MMHG

## 2017-07-28 DIAGNOSIS — M25.511 ACUTE PAIN OF RIGHT SHOULDER: Primary | ICD-10-CM

## 2017-07-28 DIAGNOSIS — E11.22 TYPE 2 DIABETES MELLITUS WITH STAGE 2 CHRONIC KIDNEY DISEASE, WITHOUT LONG-TERM CURRENT USE OF INSULIN: ICD-10-CM

## 2017-07-28 DIAGNOSIS — N18.2 TYPE 2 DIABETES MELLITUS WITH STAGE 2 CHRONIC KIDNEY DISEASE, WITHOUT LONG-TERM CURRENT USE OF INSULIN: ICD-10-CM

## 2017-07-28 DIAGNOSIS — M25.511 ACUTE PAIN OF RIGHT SHOULDER: ICD-10-CM

## 2017-07-28 PROCEDURE — 73030 X-RAY EXAM OF SHOULDER: CPT | Mod: TC,RT

## 2017-07-28 PROCEDURE — 99213 OFFICE O/P EST LOW 20 MIN: CPT | Mod: S$PBB,,, | Performed by: INTERNAL MEDICINE

## 2017-07-28 PROCEDURE — 73030 X-RAY EXAM OF SHOULDER: CPT | Mod: 26,RT,, | Performed by: RADIOLOGY

## 2017-07-28 PROCEDURE — 99999 PR PBB SHADOW E&M-EST. PATIENT-LVL III: CPT | Mod: PBBFAC,,, | Performed by: INTERNAL MEDICINE

## 2017-07-28 PROCEDURE — 99213 OFFICE O/P EST LOW 20 MIN: CPT | Mod: PBBFAC,25 | Performed by: INTERNAL MEDICINE

## 2017-07-28 PROCEDURE — 3045F PR MOST RECENT HEMOGLOBIN A1C LEVEL 7.0-9.0%: CPT | Mod: ,,, | Performed by: INTERNAL MEDICINE

## 2017-07-28 PROCEDURE — 4010F ACE/ARB THERAPY RXD/TAKEN: CPT | Mod: ,,, | Performed by: INTERNAL MEDICINE

## 2017-07-28 RX ORDER — ETODOLAC 400 MG/1
400 TABLET, FILM COATED ORAL 2 TIMES DAILY PRN
Qty: 10 TABLET | Refills: 0 | Status: SHIPPED | OUTPATIENT
Start: 2017-07-28 | End: 2017-10-16

## 2017-07-28 RX ORDER — HYDROCODONE BITARTRATE AND ACETAMINOPHEN 5; 325 MG/1; MG/1
1 TABLET ORAL EVERY 6 HOURS PRN
Qty: 14 TABLET | Refills: 0 | Status: SHIPPED | OUTPATIENT
Start: 2017-07-28 | End: 2017-10-16

## 2017-07-28 NOTE — PROGRESS NOTES
Subjective:       Patient ID: Caesar Arevalo is a 66 y.o. male.    Chief Complaint: Arm Pain    Patient of Dr. Yan here for an urgent visit c/o non-traumatic right upper arm pain x one week, gradually worsening. He localizes the pain to the right biceps area, with limitation of movement due to increased pain with any use of the limb. He is right hand dominant, does not recall over-exerting himself in any way recently, nor did he sleep in an awkward position. The pain is not radiating from the neck, and neck movements do not affect the arm at this time. No associated weakness, numbness or tingling. No swelling of the limb, no skin lesions. No associated chest pain, palpitations or SOB. No previous similar episodes. No meds tried.     PMH: includes Uncontrolled DM2 with CKD2, HTN, Cervical Spondylosis.  NKDA.   Former tobacco use, Retired Marine.  Medications: reviewed.         Review of Systems   Constitutional: Negative for chills, diaphoresis and fever.   Respiratory: Negative for cough and shortness of breath.    Cardiovascular: Negative for chest pain and palpitations.   Skin: Negative for color change and rash.   Neurological: Negative for dizziness, syncope, weakness, numbness and headaches.       Objective:    /92, Pulse 106, Wt 254 lbs  Physical Exam   Constitutional: He appears well-developed and well-nourished.   HENT:   Nose: Nose normal.   Mouth/Throat: Oropharynx is clear and moist.   Eyes: Conjunctivae are normal. No scleral icterus.   Neck: Normal range of motion. Neck supple.   Cardiovascular: Normal rate, regular rhythm and normal heart sounds.    Pulmonary/Chest: Effort normal and breath sounds normal. No respiratory distress.   Musculoskeletal: He exhibits no edema or deformity.   Right arm is grossly normal in appearance with no edema, no deformity, no skin changes. Biceps area is not tender, normal muscle tone. Right shoulder is markedly tender anteriorly, with pain on movement in all  directions active and passive, unable to comfortably abduct >45 degrees. Other joints normal including elbow, wrist and hand. Motor strength grossly intact.    Skin: Skin is warm and dry. No rash noted. He is not diaphoretic.   Psychiatric: He has a normal mood and affect. His behavior is normal.       Assessment:       1. Acute pain of right shoulder    2. Type 2 diabetes mellitus with stage 2 chronic kidney disease, without long-term current use of insulin        Plan:       Acute pain of right shoulder  -     X-ray Shoulder 2 or More Views Right; Future; Expected date: 07/28/2017  -     Ambulatory Referral to Orthopedics  -     etodolac (LODINE) 400 MG tablet; Take 1 tablet (400 mg total) by mouth 2 (two) times daily as needed (Pain and inflammation.).  Dispense: 10 tablet; Refill: 0 - SHORT TERM ONLY.  -     hydrocodone-acetaminophen 5-325mg (NORCO) 5-325 mg per tablet; Take 1 tablet by mouth every 6 (six) hours as needed (severe pain.).  Dispense: 14 tablet; Refill: 0    Type 2 diabetes mellitus with stage 2 chronic kidney disease, without long-term current use of insulin        -     Deferred use of systemic steroids at this time, but would likely tolerate an intra-articular injection.

## 2017-07-31 RX ORDER — LANCETS 28 GAUGE
EACH MISCELLANEOUS
Qty: 100 EACH | Refills: 5 | Status: SHIPPED | OUTPATIENT
Start: 2017-07-31 | End: 2018-01-16 | Stop reason: SDUPTHER

## 2017-08-01 ENCOUNTER — OFFICE VISIT (OUTPATIENT)
Dept: ORTHOPEDICS | Facility: CLINIC | Age: 67
End: 2017-08-01
Payer: OTHER GOVERNMENT

## 2017-08-01 VITALS — BODY MASS INDEX: 35.62 KG/M2 | WEIGHT: 254.44 LBS | HEIGHT: 71 IN

## 2017-08-01 DIAGNOSIS — M25.519 SHOULDER PAIN, UNSPECIFIED CHRONICITY, UNSPECIFIED LATERALITY: Primary | ICD-10-CM

## 2017-08-01 PROCEDURE — 99999 PR PBB SHADOW E&M-EST. PATIENT-LVL III: CPT | Mod: PBBFAC,,, | Performed by: NURSE PRACTITIONER

## 2017-08-01 PROCEDURE — 99203 OFFICE O/P NEW LOW 30 MIN: CPT | Mod: S$PBB,,, | Performed by: NURSE PRACTITIONER

## 2017-08-01 PROCEDURE — 3008F BODY MASS INDEX DOCD: CPT | Mod: ,,, | Performed by: NURSE PRACTITIONER

## 2017-08-01 PROCEDURE — 99213 OFFICE O/P EST LOW 20 MIN: CPT | Mod: PBBFAC,PO | Performed by: NURSE PRACTITIONER

## 2017-08-01 NOTE — LETTER
August 3, 2017      Veena Chow MD  [address]  [facility] [address]           [facility]  [address]  [facility] [address]  Phone: [phone]  Fax: [phone]          Patient: Caesar Arevalo   MR Number: [id number]   YOB: 1950   Date of Visit: 8/1/2017       Dear Dr. Veena Chow:    Thank you for referring Caesar Arevalo to me for evaluation. Attached you will find relevant portions of my assessment and plan of care.    If you have questions, please do not hesitate to call me. I look forward to following Caesar Arevalo along with you.    Sincerely,    Amelia Cruz NP    Enclosure  CC:  No Recipients    If you would like to receive this communication electronically, please contact [email or web address] or [phone] to request more information on [employer] Link access.    For providers and/or their staff who would like to refer a patient to [facility], please contact us through our one-stop-shop provider referral line, [facility], at [phone].    If you feel you have received this communication in error or would no longer like to receive these types of communications, please e-mail [email or web address]

## 2017-08-04 NOTE — PROGRESS NOTES
CC: Pain of the Right Shoulder      HPI: Pt with right shoulder pain for the past several months. The pain is aching and radiates down the arm. The pain is worse at night. He is taking lodine with some relief since day before yesterday. He was able to cut his grass yesterday without difficulty. The pain is now a 2 out of 10.     ROS  General: denies fever and chills  Resp: no c/o sob  CVS: no c/o cp  MSK: c/o right shoulder pain which was aching and radiated down the arm. The pain has mostly resolved now    PE  General: AAOx3, pleasant and cooperative  Resp: respirations even and unlabored  MSK: right shoulder exam  - neer  - leach  - pain with internal and external rotation    Xray:  Ordered and reviewed by me: Visualized osseous structures appear intact, with no definite evidence of recent or healing fracture, lytic destructive process, appreciable glenohumeral arthritic change, or other significant abnormality identified.  No glenohumeral dislocation.  No significant soft tissue calcification    Assessment:  Right shoulder bursitis, resolving with lodine    Plan:  Pt declines injection and PT at this time. He would like to continue the lodine for now. He will f/u if the pain returns. He was given my contact information

## 2017-08-09 ENCOUNTER — TELEPHONE (OUTPATIENT)
Dept: SLEEP MEDICINE | Facility: OTHER | Age: 67
End: 2017-08-09

## 2017-08-17 ENCOUNTER — TELEPHONE (OUTPATIENT)
Dept: OPTOMETRY | Facility: CLINIC | Age: 67
End: 2017-08-17

## 2017-08-17 NOTE — TELEPHONE ENCOUNTER
----- Message from Rema Ness OD sent at 8/17/2017 10:58 AM CDT -----  Regarding: call pt to reschedule  Hello,    This patient is scheduled on 09/01/17 when I am on vacation. Please call or [facility] him [facility].    Thank you,  Rema Ness OD

## 2017-08-21 ENCOUNTER — PATIENT MESSAGE (OUTPATIENT)
Dept: INTERNAL MEDICINE | Facility: CLINIC | Age: 67
End: 2017-08-21

## 2017-08-21 ENCOUNTER — TELEPHONE (OUTPATIENT)
Dept: INTERNAL MEDICINE | Facility: CLINIC | Age: 67
End: 2017-08-21

## 2017-08-21 DIAGNOSIS — E11.9 TYPE 2 DIABETES MELLITUS WITHOUT RETINOPATHY: Primary | ICD-10-CM

## 2017-08-24 ENCOUNTER — TELEPHONE (OUTPATIENT)
Dept: INTERNAL MEDICINE | Facility: CLINIC | Age: 67
End: 2017-08-24

## 2017-08-24 ENCOUNTER — TELEPHONE (OUTPATIENT)
Dept: OPTOMETRY | Facility: CLINIC | Age: 67
End: 2017-08-24

## 2017-08-24 NOTE — TELEPHONE ENCOUNTER
LM. If he calls back. Please get him into [facility] on [address], he was seen by Octavio the educator on 2/17.  He could do his eye exam at the same time

## 2017-08-25 ENCOUNTER — OFFICE VISIT (OUTPATIENT)
Dept: OPTOMETRY | Facility: CLINIC | Age: 67
End: 2017-08-25
Payer: OTHER GOVERNMENT

## 2017-08-25 DIAGNOSIS — Z79.84 LONG TERM CURRENT USE OF ORAL HYPOGLYCEMIC DRUG: ICD-10-CM

## 2017-08-25 DIAGNOSIS — H25.13 NUCLEAR SCLEROTIC CATARACT OF BOTH EYES: ICD-10-CM

## 2017-08-25 DIAGNOSIS — Q14.1 CONGENITAL HYPERTROPHY OF RETINAL PIGMENT EPITHELIUM: ICD-10-CM

## 2017-08-25 DIAGNOSIS — H04.123 BILATERAL DRY EYES: ICD-10-CM

## 2017-08-25 DIAGNOSIS — H52.13 BILATERAL MYOPIA: ICD-10-CM

## 2017-08-25 DIAGNOSIS — E11.65 TYPE 2 DIABETES MELLITUS WITH HYPERGLYCEMIA, WITHOUT LONG-TERM CURRENT USE OF INSULIN: Primary | ICD-10-CM

## 2017-08-25 PROCEDURE — 99999 PR PBB SHADOW E&M-EST. PATIENT-LVL II: CPT | Mod: PBBFAC,,, | Performed by: OPTOMETRIST

## 2017-08-25 PROCEDURE — 92015 DETERMINE REFRACTIVE STATE: CPT | Mod: ,,, | Performed by: OPTOMETRIST

## 2017-08-25 PROCEDURE — 99212 OFFICE O/P EST SF 10 MIN: CPT | Mod: PBBFAC | Performed by: OPTOMETRIST

## 2017-08-25 PROCEDURE — 92014 COMPRE OPH EXAM EST PT 1/>: CPT | Mod: S$PBB,,, | Performed by: OPTOMETRIST

## 2017-08-25 NOTE — PROGRESS NOTES
HPI     Mr. Arevalo was referred by Sophie Yan MD for a diabetic eye exam    Pt c/o of not seeing well out of his current glasses, pt states this may   be due to the age of the glasses and the anti-reflective coating breaking   down. He requests refraction today.    (-)drops  (-)flashes  (-)floaters  (-)diplopia    Diabetic yes   Hemoglobin A1C       Date                     Value               Ref Range             Status                05/30/2017               8.6 (H)             4.5 - 6.2 %           Final                  01/25/2017               8.9 (H)             4.5 - 6.2 %           Final                  10/25/2016               8.8 (H)             4.5 - 6.2 %           Final                OCULAR HISTORY  Last Eye Exam 08/24/16 Dr. Ness   (-)eye surgery   Cataracts OU  CHRPE OS    FAMILY HISTORY  (-)Glaucoma none       Last edited by Rema Ness, OD on 8/25/2017 10:25 AM. (History)            Assessment /Plan     For exam results, see Encounter Report.    Type 2 diabetes mellitus with hyperglycemia, without long-term current use of insulin  Long term current use of oral hypoglycemic drug   No retinopathy noted OU. Continue management of DM as directed by PCP. Monitor with DFE in 1 year, or RTC immediately with any vision changes.     Nuclear sclerotic cataract of both eyes   Mild and stable OU. Not visually significant OU. Monitor.      Congenital hypertrophy of retinal pigment epithelium   Left eye. As previously noted. Stable. Monitor yearly.    Bilateral myopia   Pt understands that refractive error changes with blood glucose fluctuations; pt opts to proceed with refraction today.    Small axis change OD and hyperopic shift OS. New glasses prescription released, adaptation expected.    Eyeglass Final Rx     Eyeglass Final Rx       Sphere Cylinder Axis Add    Right -0.50 +0.50 105 +2.50    Left -0.25 Sphere  +2.50    Expiration Date:  8/26/2018            Bilateral dry eyes   Recommended  artificial tears prn.         RTC 1 year

## 2017-09-01 RX ORDER — FLUTICASONE PROPIONATE 50 MCG
SPRAY, SUSPENSION (ML) NASAL
Qty: 32 G | Refills: 0 | Status: SHIPPED | OUTPATIENT
Start: 2017-09-01 | End: 2018-03-15

## 2017-09-06 ENCOUNTER — TELEPHONE (OUTPATIENT)
Dept: SLEEP MEDICINE | Facility: OTHER | Age: 67
End: 2017-09-06

## 2017-09-18 ENCOUNTER — TELEPHONE (OUTPATIENT)
Dept: SLEEP MEDICINE | Facility: OTHER | Age: 67
End: 2017-09-18

## 2017-10-06 ENCOUNTER — PATIENT OUTREACH (OUTPATIENT)
Dept: INTERNAL MEDICINE | Facility: CLINIC | Age: 67
End: 2017-10-06

## 2017-10-06 NOTE — PROGRESS NOTES
[facility] is committed to your overall health.  To help you get the most out of each of your visits, we will review your information to make sure you are up to date on all of your recommended tests and/or procedures.       Your PCP  Sophie Yan MD   found that you may be due for:       Health Maintenance Due   Topic Date Due    Influenza Vaccine  08/01/2017             If you have had any of the above done at another facility, please bring the records or information with you so that your record at [facility] will be complete.  If you would like to schedule any of these, please contact me.     If you are currently taking medication, please bring it with you to your appointment for review.     Also, if you have any type of Advanced Directives, please bring them with you to your office visit so we may scan them into your chart.       Thank you for choosing [facility] for your healthcare needs.     Patient contacted via [facility] patient portal in regards to healthcare matters that were past due. Encouraged to contact facility or speak with PCP .

## 2017-10-09 ENCOUNTER — LAB VISIT (OUTPATIENT)
Dept: LAB | Facility: HOSPITAL | Age: 67
End: 2017-10-09
Attending: FAMILY MEDICINE
Payer: OTHER GOVERNMENT

## 2017-10-09 DIAGNOSIS — E11.40 TYPE 2 DIABETES MELLITUS WITH DIABETIC NEUROPATHY, WITHOUT LONG-TERM CURRENT USE OF INSULIN: ICD-10-CM

## 2017-10-09 LAB
ESTIMATED AVG GLUCOSE: 209 MG/DL
HBA1C MFR BLD HPLC: 8.9 %

## 2017-10-09 PROCEDURE — 36415 COLL VENOUS BLD VENIPUNCTURE: CPT

## 2017-10-09 PROCEDURE — 83036 HEMOGLOBIN GLYCOSYLATED A1C: CPT

## 2017-10-12 ENCOUNTER — PATIENT MESSAGE (OUTPATIENT)
Dept: INTERNAL MEDICINE | Facility: CLINIC | Age: 67
End: 2017-10-12

## 2017-10-12 NOTE — TELEPHONE ENCOUNTER
----- Message from Lovely Castillo sent at 10/12/2017  3:38 PM CDT -----  Contact: pt  _ x 1st Request  _  2nd Request  _  3rd Request      Who:pt    Why: returning call back     What Number to Call Back: [phone]    When to Expect a call back: (Before the end of the day)   -- if call after 3:00 call back will be tomorrow.

## 2017-10-16 ENCOUNTER — OFFICE VISIT (OUTPATIENT)
Dept: INTERNAL MEDICINE | Facility: CLINIC | Age: 67
End: 2017-10-16
Payer: OTHER GOVERNMENT

## 2017-10-16 VITALS
SYSTOLIC BLOOD PRESSURE: 140 MMHG | DIASTOLIC BLOOD PRESSURE: 80 MMHG | OXYGEN SATURATION: 97 % | WEIGHT: 249.69 LBS | BODY MASS INDEX: 34.96 KG/M2 | HEIGHT: 71 IN | HEART RATE: 103 BPM

## 2017-10-16 DIAGNOSIS — I10 ESSENTIAL HYPERTENSION: Primary | ICD-10-CM

## 2017-10-16 DIAGNOSIS — E11.40 TYPE 2 DIABETES MELLITUS WITH DIABETIC NEUROPATHY, WITHOUT LONG-TERM CURRENT USE OF INSULIN: ICD-10-CM

## 2017-10-16 PROCEDURE — G0008 ADMIN INFLUENZA VIRUS VAC: HCPCS | Mod: PBBFAC,PN

## 2017-10-16 PROCEDURE — 99999 PR PBB SHADOW E&M-EST. PATIENT-LVL IV: CPT | Mod: PBBFAC,,, | Performed by: FAMILY MEDICINE

## 2017-10-16 PROCEDURE — 99214 OFFICE O/P EST MOD 30 MIN: CPT | Mod: PBBFAC,PN | Performed by: FAMILY MEDICINE

## 2017-10-16 PROCEDURE — 99214 OFFICE O/P EST MOD 30 MIN: CPT | Mod: S$PBB,,, | Performed by: FAMILY MEDICINE

## 2017-10-16 RX ORDER — VIT C/E/ZN/COPPR/LUTEIN/ZEAXAN 250MG-90MG
1000 CAPSULE ORAL DAILY
COMMUNITY

## 2017-10-16 NOTE — PATIENT INSTRUCTIONS
bp follow up 4-6 weeks have him take meds before    Vitamin D3 2,000 IU daily  Take 1 fish oil with each meal

## 2017-10-16 NOTE — PROGRESS NOTES
"Subjective:       Patient ID: Caesar Arevalo is a 66 y.o. male.    Chief Complaint: Follow-up    HPI 65y/o with DM2, HTN, Vit D def, CLBP,is here for follow up.  Denies f/n/v, says he went bowling last week and his L leg has been sore, denies d/constipation/sob/cp/urinary sx.  Sleeping ok, due for follow up with sleep clinic. Appetite ok.  DM2: a1c increased since last visit, 8.9, endorses "eating too many sweets", he is checking 3 times a day, range 116-190, on statin, ace, trajenta, eye exam utd and foot exam utd  HTN: increased did not take meds today  Followed by outside urologist    Review of Systems   Constitutional: Negative for activity change, appetite change, fatigue and fever.   Respiratory: Negative for cough and shortness of breath.    Cardiovascular: Negative for chest pain, palpitations and leg swelling.   Gastrointestinal: Negative for constipation, diarrhea, nausea and vomiting.   Genitourinary: Negative for difficulty urinating and dysuria.   Skin: Negative for rash.   Neurological: Negative for dizziness and light-headedness.   Psychiatric/Behavioral: Negative for sleep disturbance.       Objective:      BP (!) 140/80   Pulse 103   Ht 5' 11" (1.803 m)   Wt 113.3 kg (249 lb 10.7 oz)   SpO2 97%   BMI 34.82 kg/m²   Physical Exam   Constitutional: He appears well-developed and well-nourished.   HENT:   Head: Normocephalic and atraumatic.   Mouth/Throat: No oropharyngeal exudate.   Neck: Normal range of motion. Neck supple. No thyromegaly present.   Cardiovascular: Normal rate, regular rhythm and normal heart sounds.    Pulmonary/Chest: Effort normal and breath sounds normal. No respiratory distress.   Musculoskeletal: He exhibits no edema.   Lymphadenopathy:     He has no cervical adenopathy.   Neurological: He is alert.   Skin: Skin is warm and dry.   Psychiatric: He has a normal mood and affect.   Nursing note and vitals reviewed.      Assessment:       1. Essential hypertension    2. Type 2 " diabetes mellitus with diabetic neuropathy, without long-term current use of insulin        Plan:   Caesar was seen today for follow-up.    Diagnoses and all orders for this visit:    Essential hypertension  -     Comprehensive metabolic panel; Future    Type 2 diabetes mellitus with diabetic neuropathy, without long-term current use of insulin  -     Ambulatory consult to Diabetic Education  -     Hemoglobin A1c; Future  -     Comprehensive metabolic panel; Future    Other orders  -     Influenza - Quadrivalent (3 years & older) (PF)

## 2017-10-26 ENCOUNTER — PATIENT MESSAGE (OUTPATIENT)
Dept: NEUROSURGERY | Facility: CLINIC | Age: 67
End: 2017-10-26

## 2017-10-26 ENCOUNTER — PATIENT MESSAGE (OUTPATIENT)
Dept: INTERNAL MEDICINE | Facility: CLINIC | Age: 67
End: 2017-10-26

## 2017-10-27 ENCOUNTER — PATIENT MESSAGE (OUTPATIENT)
Dept: INTERNAL MEDICINE | Facility: CLINIC | Age: 67
End: 2017-10-27

## 2017-11-13 ENCOUNTER — TELEPHONE (OUTPATIENT)
Dept: SLEEP MEDICINE | Facility: OTHER | Age: 67
End: 2017-11-13

## 2017-12-07 ENCOUNTER — PATIENT MESSAGE (OUTPATIENT)
Dept: PODIATRY | Facility: CLINIC | Age: 67
End: 2017-12-07

## 2017-12-07 RX ORDER — NAFTIFINE HYDROCHLORIDE 20 MG/G
1 GEL TOPICAL DAILY
Qty: 60 G | Refills: 2 | Status: SHIPPED | OUTPATIENT
Start: 2017-12-07

## 2017-12-11 DIAGNOSIS — J30.9 ALLERGIC RHINITIS: ICD-10-CM

## 2017-12-11 DIAGNOSIS — I10 ESSENTIAL HYPERTENSION: ICD-10-CM

## 2017-12-11 RX ORDER — LISINOPRIL 10 MG/1
TABLET ORAL
Qty: 90 TABLET | Refills: 1 | Status: SHIPPED | OUTPATIENT
Start: 2017-12-11 | End: 2018-04-11 | Stop reason: SDUPTHER

## 2017-12-11 RX ORDER — LORATADINE 10 MG/1
TABLET ORAL
Qty: 90 TABLET | Refills: 1 | Status: SHIPPED | OUTPATIENT
Start: 2017-12-11 | End: 2018-05-22 | Stop reason: SDUPTHER

## 2017-12-12 ENCOUNTER — TELEPHONE (OUTPATIENT)
Dept: SLEEP MEDICINE | Facility: OTHER | Age: 67
End: 2017-12-12

## 2017-12-27 ENCOUNTER — TELEPHONE (OUTPATIENT)
Dept: SLEEP MEDICINE | Facility: OTHER | Age: 67
End: 2017-12-27

## 2017-12-29 DIAGNOSIS — I10 ESSENTIAL HYPERTENSION: ICD-10-CM

## 2017-12-29 RX ORDER — AMLODIPINE BESYLATE 10 MG/1
TABLET ORAL
Qty: 90 TABLET | Refills: 1 | Status: SHIPPED | OUTPATIENT
Start: 2017-12-29 | End: 2018-06-28 | Stop reason: SDUPTHER

## 2018-01-03 ENCOUNTER — PATIENT OUTREACH (OUTPATIENT)
Dept: INTERNAL MEDICINE | Facility: CLINIC | Age: 68
End: 2018-01-03

## 2018-01-03 NOTE — PROGRESS NOTES
[facility] is committed to your overall health.  To help you get the most out of each of your visits, we will review your information to make sure you are up to date on all of your recommended tests and/or procedures.       Your PCP  Sophie Yan MD   found that you may be due for:       Health Maintenance Due   Topic Date Due    Pneumococcal (65+) (1 of 2 - PCV13) 11/08/2017    Abdominal Aortic Aneurysm Screening  11/08/2017           If you have had any of the above done at another facility, please bring the records or information with you so that your record at [facility] will be complete.  If you would like to schedule any of these, please contact me.     If you are currently taking medication, please bring it with you to your appointment for review.     Also, if you have any type of Advanced Directives, please bring them with you to your office visit so we may scan them into your chart.       Thank you for Choosing [facility] for your healthcare needs.        Additional Information  If you have questions, you can email [facility] [email or web address] or call [phone]  to talk to our [facility] staff. Remember, [facility] is NOT to be used for urgent needs. For medical emergencies, dial 911.

## 2018-01-08 ENCOUNTER — OFFICE VISIT (OUTPATIENT)
Dept: NEUROSURGERY | Facility: CLINIC | Age: 68
End: 2018-01-08
Payer: MEDICARE

## 2018-01-08 ENCOUNTER — HOSPITAL ENCOUNTER (OUTPATIENT)
Dept: RADIOLOGY | Facility: HOSPITAL | Age: 68
Discharge: HOME | End: 2018-01-08
Attending: NEUROLOGICAL SURGERY
Payer: MEDICARE

## 2018-01-08 ENCOUNTER — TELEPHONE (OUTPATIENT)
Dept: INTERNAL MEDICINE | Facility: CLINIC | Age: 68
End: 2018-01-08

## 2018-01-08 VITALS
TEMPERATURE: 98 F | BODY MASS INDEX: 35.32 KG/M2 | WEIGHT: 252.31 LBS | SYSTOLIC BLOOD PRESSURE: 150 MMHG | HEART RATE: 90 BPM | HEIGHT: 71 IN | DIASTOLIC BLOOD PRESSURE: 87 MMHG

## 2018-01-08 DIAGNOSIS — M48.02 CERVICAL STENOSIS OF SPINAL CANAL: ICD-10-CM

## 2018-01-08 DIAGNOSIS — M47.816 SPONDYLOSIS OF LUMBAR REGION WITHOUT MYELOPATHY OR RADICULOPATHY: ICD-10-CM

## 2018-01-08 DIAGNOSIS — M54.12 CERVICAL RADICULOPATHY: Primary | ICD-10-CM

## 2018-01-08 DIAGNOSIS — M47.22 OSTEOARTHRITIS OF SPINE WITH RADICULOPATHY, CERVICAL REGION: ICD-10-CM

## 2018-01-08 PROCEDURE — 72125 CT NECK SPINE W/O DYE: CPT | Mod: TC

## 2018-01-08 PROCEDURE — 99213 OFFICE O/P EST LOW 20 MIN: CPT | Mod: PBBFAC,25 | Performed by: NEUROLOGICAL SURGERY

## 2018-01-08 PROCEDURE — 99999 PR PBB SHADOW E&M-EST. PATIENT-LVL III: CPT | Mod: PBBFAC,,, | Performed by: NEUROLOGICAL SURGERY

## 2018-01-08 PROCEDURE — 72125 CT NECK SPINE W/O DYE: CPT | Mod: 26,GC,, | Performed by: INTERNAL MEDICINE

## 2018-01-08 PROCEDURE — 99213 OFFICE O/P EST LOW 20 MIN: CPT | Mod: S$PBB,,, | Performed by: NEUROLOGICAL SURGERY

## 2018-01-08 NOTE — PROGRESS NOTES
Established [facility]    SUBJECTIVE:     History of Present Illness:  Mr. Arevalo is a 67-year-old male who is seeing me today in follow-up.  His last neurosurgery clinic appointment was May 4, 2017.  He underwent a C5-6 and C6-7 ACDF in October 2016.  He has done well postoperatively at the time of his last clinic appointment he had minimal to no neck pain but he continued to complained of lumbar spine pain.  An MRI of the lumbar spine showed multilevel lumbar spondylosis which was nonoperative.  I referred the patient to pain management for epidural steroid injections.  He is here today to see me in follow-up. He states that he has had some increasing neck stiffness and difficulty turning his neck side to side.  But overall, he denies neck pain or radicular arm pain.  He continues to complain of low back pain with radiation into his buttocks.  He had steroid injections but these did not seem to be helpful.  Despite his continued pain he has been more active and gotten back into bowling which he enjoys.    Review of patient's allergies indicates:  No Known Allergies    Current Outpatient Prescriptions   Medication Sig Dispense Refill    amLODIPine (NORVASC) 10 MG tablet TAKE 1 TABLET EVERY MORNING 90 tablet 1    atorvastatin (LIPITOR) 20 MG tablet Take 1 tablet (20 mg total) by mouth every morning. 90 tablet 1    blood sugar diagnostic Strp 1 strip by Misc.(Non-Drug; Combo Route) route 3 (three) times daily. freestyle 300 strip 6    cholecalciferol, vitamin D3, (VITAMIN D3) 1,000 unit capsule Take 1,000 Units by mouth once daily.      fluticasone (FLONASE) 50 mcg/actuation nasal spray USE ONE SPRAY IN EACH NOSTRIL DAILY 32 g 0    FREESTYLE LANCETS 28 gauge lancets USE TO TEST THREE TIMES A DAY AS DIRECTED 100 each 5    linagliptin (TRADJENTA) 5 mg Tab tablet Take 1 tablet (5 mg total) by mouth once daily. 90 tablet 1    lisinopril 10 MG tablet TAKE 1 TABLET DAILY 90 tablet 1    loratadine (CLARITIN) 10 mg  tablet TAKE 1 TABLET DAILY 90 tablet 1    metformin (GLUCOPHAGE) 1000 MG tablet Take 1 tablet (1,000 mg total) by mouth 2 (two) times daily with meals. 180 tablet 1    multivit-min-folic-vit K-lycop (ONE-A-DAY MEN'S 50 PLUS) 400- mcg Tab Take by mouth.      mupirocin (BACTROBAN) 2 % ointment Apply tid to any forming abscesses, also bid to bilateral nares x5d 30 g 0    naftifine 2 % Gel Apply 1 application topically once daily. 60 g 2    omega-3 fatty acids (FISH OIL) 300 mg Cap Take 300 mg by mouth.       No current facility-administered medications for this visit.        Past Medical History:   Diagnosis Date    Bell's palsy     1980's    Diabetes mellitus, type 2     Hypertension     TB lung, latent 1980    s/p treatment     Past Surgical History:   Procedure Laterality Date    COLONOSCOPY N/A 7/25/2016    Procedure: COLONOSCOPY;  Surgeon: RICARDO Mclain MD;  Location: [facility] ([address] [place] [address]);  Service: Endoscopy;  Laterality: N/A;    HERNIA REPAIR  04/2016    norris    KNEE LIGAMENT RECONSTRUCTION      plantar warts      PROSTATE SURGERY       Family History     Problem Relation (Age of Onset)    Diabetes Mother, Sister    Heart disease Mother    Hypertension Mother        Social History     Social History    Marital status:      Spouse name: N/A    Number of children: 4    Years of education: N/A     Occupational History    retired [employer]      Social History Main Topics    Smoking status: Former Smoker     Packs/day: 1.00     Years: 30.00     Quit date: 9/23/2011    Smokeless tobacco: Never Used    Alcohol use 0.0 oz/week      Comment: occasionally-     Drug use: No    Sexual activity: Yes     Partners: Female     Other Topics Concern    None     Social History Narrative    None       Review of Systems:  Review of Systems   Constitutional: Negative for activity change, diaphoresis, fatigue, fever and unexpected weight change.   Respiratory: Negative for cough, shortness  "of breath and wheezing.    Cardiovascular: Negative for chest pain and palpitations.   Gastrointestinal: Negative for abdominal distention, abdominal pain, blood in stool, constipation, diarrhea, nausea and vomiting.   Genitourinary: Negative for difficulty urinating, enuresis, frequency and urgency.   Musculoskeletal: Positive for back pain, gait problem, neck pain and neck stiffness. Negative for joint swelling and myalgias.   Skin: Negative for color change, rash and wound.   Allergic/Immunologic: Negative for environmental allergies and food allergies.   Neurological: Negative for dizziness, seizures, facial asymmetry, speech difficulty, weakness, light-headedness, numbness and headaches.   Hematological: Does not bruise/bleed easily.   Psychiatric/Behavioral: Negative for agitation, behavioral problems, dysphoric mood and hallucinations. The patient is not nervous/anxious.        OBJECTIVE:     Vital Signs  Temp: 97.8 °F (36.6 °C)  Pulse: 90  BP: (!) 150/87  Pain Score:   2  Height: 5' 11" (180.3 cm)  Weight: 114.4 kg (252 lb 4.8 oz)  Body mass index is 35.19 kg/m².    Physical Exam:  Physical Exam:    Constitutional: He appears well-developed and well-nourished. No distress.     Eyes: Pupils are equal, round, and reactive to light. Conjunctivae and EOM are normal.     Cardiovascular: Normal rate, regular rhythm, normal pulses and no edema.     Abdominal: Soft. Bowel sounds are normal.     Skin: Skin displays no rash on trunk and no rash on extremities. Skin displays no lesions on trunk and no lesions on extremities.     Psych/Behavior: He is alert. He is oriented to person, place, and time. He has a normal mood and affect.     Musculoskeletal: Gait is normal.        Neck: Range of motion is full. There is no tenderness. Muscle strength is 5/5. Tone is normal.        Back: Range of motion is full. There is no tenderness. Muscle strength is 5/5. Tone is normal.        Right Upper Extremities: Range of motion is " full. There is no tenderness. Muscle strength is 5/5. Tone is normal.        Left Upper Extremities: Range of motion is full. There is no tenderness. Muscle strength is 5/5. Tone is normal.       Right Lower Extremities: Range of motion is full. There is no tenderness. Muscle strength is 5/5. Tone is normal.        Left Lower Extremities: Range of motion is full. There is no tenderness. Muscle strength is 5/5. Tone is normal.     Neurological:        Coordination: He has a normal Romberg Test, normal finger to nose coordination and normal tandem walking coordination.        Sensory: There is no sensory deficit in the trunk. There is no sensory deficit in the extremities.        DTRs: DTRs are DTRS NORMAL AND SYMMETRICnormal and symmetric. He displays no Babinski's sign on the right side. He displays no Babinski's sign on the left side.        Cranial nerves: Cranial nerve(s) II, III, IV, V, VI, VII, VIII, IX, X, XI and XII are intact.         Diagnostic Results:  He has a CAT scan of the cervical spine available for review which I personally reviewed.  This shows great fusion across the C5-6 and C6-7 levels.    ASSESSMENT/PLAN:     Mr. Arevalo is a 67-year-old male status post C5-6 and C6-7 ACDF.  He has minimal neck pain but some neck stiffness at this time.  He continues to complain of low back pain.  Overall, he feels better than he did preoperatively.  His most recent imaging studies show complete fusion across the C5-6 and C6-7 levels.  His low back pain remains nonoperative at this time.  I have encouraged him to continue conservative therapy.  From a cervical standpoint, since he is fused so well, there is no further need for neurosurgical follow-up.  I will see the patient on an as-needed basis.  He knows he can call with any further questions or concerns.        Note dictated with voice recognition software, please excuse any grammatical errors.

## 2018-01-09 DIAGNOSIS — E11.40 TYPE 2 DIABETES MELLITUS WITH DIABETIC NEUROPATHY, WITHOUT LONG-TERM CURRENT USE OF INSULIN: ICD-10-CM

## 2018-01-09 RX ORDER — METFORMIN HYDROCHLORIDE 1000 MG/1
TABLET ORAL
Qty: 180 TABLET | Refills: 1 | Status: SHIPPED | OUTPATIENT
Start: 2018-01-09 | End: 2018-08-30 | Stop reason: SDUPTHER

## 2018-01-10 ENCOUNTER — PATIENT MESSAGE (OUTPATIENT)
Dept: INTERNAL MEDICINE | Facility: CLINIC | Age: 68
End: 2018-01-10

## 2018-01-16 ENCOUNTER — OFFICE VISIT (OUTPATIENT)
Dept: INTERNAL MEDICINE | Facility: CLINIC | Age: 68
End: 2018-01-16
Payer: MEDICARE

## 2018-01-16 ENCOUNTER — PATIENT MESSAGE (OUTPATIENT)
Dept: INTERNAL MEDICINE | Facility: CLINIC | Age: 68
End: 2018-01-16

## 2018-01-16 VITALS
OXYGEN SATURATION: 96 % | DIASTOLIC BLOOD PRESSURE: 82 MMHG | SYSTOLIC BLOOD PRESSURE: 134 MMHG | HEART RATE: 105 BPM | HEIGHT: 71 IN | WEIGHT: 252.31 LBS | BODY MASS INDEX: 35.32 KG/M2

## 2018-01-16 DIAGNOSIS — E11.40 TYPE 2 DIABETES MELLITUS WITH DIABETIC NEUROPATHY, WITHOUT LONG-TERM CURRENT USE OF INSULIN: ICD-10-CM

## 2018-01-16 DIAGNOSIS — E11.40 TYPE 2 DIABETES MELLITUS WITH DIABETIC NEUROPATHY, WITHOUT LONG-TERM CURRENT USE OF INSULIN: Primary | ICD-10-CM

## 2018-01-16 DIAGNOSIS — E11.9 TYPE 2 DIABETES MELLITUS WITHOUT COMPLICATION, WITHOUT LONG-TERM CURRENT USE OF INSULIN: ICD-10-CM

## 2018-01-16 DIAGNOSIS — Z87.891 FORMER SMOKER: ICD-10-CM

## 2018-01-16 DIAGNOSIS — K63.5 POLYP OF COLON, UNSPECIFIED PART OF COLON, UNSPECIFIED TYPE: ICD-10-CM

## 2018-01-16 DIAGNOSIS — E78.5 HYPERLIPIDEMIA, UNSPECIFIED HYPERLIPIDEMIA TYPE: ICD-10-CM

## 2018-01-16 DIAGNOSIS — Z23 NEED FOR PNEUMOCOCCAL VACCINATION: ICD-10-CM

## 2018-01-16 PROCEDURE — 99214 OFFICE O/P EST MOD 30 MIN: CPT | Mod: PBBFAC,PN | Performed by: FAMILY MEDICINE

## 2018-01-16 PROCEDURE — 99214 OFFICE O/P EST MOD 30 MIN: CPT | Mod: S$PBB,,, | Performed by: FAMILY MEDICINE

## 2018-01-16 PROCEDURE — 99999 PR PBB SHADOW E&M-EST. PATIENT-LVL IV: CPT | Mod: PBBFAC,,, | Performed by: FAMILY MEDICINE

## 2018-01-16 RX ORDER — INSULIN PUMP SYRINGE, 3 ML
EACH MISCELLANEOUS
Qty: 1 EACH | Refills: 0 | Status: SHIPPED | OUTPATIENT
Start: 2018-01-16 | End: 2020-05-09

## 2018-01-16 RX ORDER — ASPIRIN 81 MG/1
81 TABLET ORAL DAILY
COMMUNITY

## 2018-01-16 RX ORDER — ATORVASTATIN CALCIUM 20 MG/1
20 TABLET, FILM COATED ORAL EVERY MORNING
Qty: 90 TABLET | Refills: 1 | Status: SHIPPED | OUTPATIENT
Start: 2018-01-16 | End: 2018-07-15 | Stop reason: SDUPTHER

## 2018-01-16 RX ORDER — LANCETS 28 GAUGE
1 EACH MISCELLANEOUS 3 TIMES DAILY
Qty: 100 EACH | Refills: 5 | Status: SHIPPED | OUTPATIENT
Start: 2018-01-16 | End: 2019-02-22 | Stop reason: SDUPTHER

## 2018-01-16 RX ORDER — INSULIN PUMP SYRINGE, 3 ML
EACH MISCELLANEOUS
Qty: 1 EACH | Refills: 0 | Status: SHIPPED | OUTPATIENT
Start: 2018-01-16 | End: 2018-01-16 | Stop reason: SDUPTHER

## 2018-01-16 NOTE — PROGRESS NOTES
"Subjective:       Patient ID: Caesar Arevalo is a 67 y.o. male.    Chief Complaint: Hypertension    HPI  66 y/o former smoker with DM2, HTN, Vit D def, CLBP, colon polyp (tubular adenoma) is here for follow up. He has had a cold for a few days, he has post nasal drip, sneezing, watery eyes, cough productive, feels he is getting better.  Denies f/body aches/n/v/d/constipation/sob/cp/urinary sx.  Sleeping ok, due for follow up with sleep clinic. Appetite ok.  DM2: a1c improved a little now 8.5, he is checking 3 times a day, low 170 high 217, on statin, ace, trajenta, eye exam utd and foot exam utd  Has a bunion on R great toe, not painfule at this time, will f/u with podiatry if needed earlier  HTN: improved today, took meds  Followed by outside urologist     Review of Systems   Constitutional: Negative for activity change, appetite change, fatigue and fever.   Respiratory: Negative for cough and shortness of breath.    Cardiovascular: Negative for chest pain, palpitations and leg swelling.   Gastrointestinal: Negative for constipation, diarrhea, nausea and vomiting.   Genitourinary: Negative for difficulty urinating and dysuria.   Skin: Negative for rash.   Neurological: Negative for dizziness and light-headedness.   Psychiatric/Behavioral: Negative for sleep disturbance.       Objective:      /82 (BP Location: Right arm, Patient Position: Sitting, BP Method: Large (Manual))   Pulse 105   Ht 5' 11" (1.803 m)   Wt 114.4 kg (252 lb 5.1 oz)   SpO2 96%   BMI 35.19 kg/m²   Physical Exam   Constitutional: He appears well-developed and well-nourished.   HENT:   Head: Normocephalic and atraumatic.   Mouth/Throat: No oropharyngeal exudate.   Neck: Normal range of motion. Neck supple. No thyromegaly present.   Cardiovascular: Normal rate, regular rhythm and normal heart sounds.    Pulmonary/Chest: Effort normal and breath sounds normal. No respiratory distress.   Musculoskeletal: He exhibits no edema. "   Lymphadenopathy:     He has no cervical adenopathy.   Neurological: He is alert.   Skin: Skin is warm and dry.   Psychiatric: He has a normal mood and affect.   Nursing note and vitals reviewed.      Assessment:       1. Type 2 diabetes mellitus with diabetic neuropathy, without long-term current use of insulin    2. Former smoker    3. Hyperlipidemia, unspecified hyperlipidemia type    4. Need for pneumococcal vaccination    5. Polyp of colon, unspecified part of colon, unspecified type        Plan:   Caesar was seen today for hypertension.    Diagnoses and all orders for this visit:    Type 2 diabetes mellitus with diabetic neuropathy, without long-term current use of insulin  -     atorvastatin (LIPITOR) 20 MG tablet; Take 1 tablet (20 mg total) by mouth every morning.  -     linagliptin (TRADJENTA) 5 mg Tab tablet; Take 1 tablet (5 mg total) by mouth once daily.  -     blood-glucose meter (FREESTYLE SYSTEM KIT) kit; Use as instructed  -     Lipid panel; Future  -     Hemoglobin A1c; Future  -     TSH; Future    Former smoker  -     US Abdominal Aorta; Future    Hyperlipidemia, unspecified hyperlipidemia type  -     atorvastatin (LIPITOR) 20 MG tablet; Take 1 tablet (20 mg total) by mouth every morning.  -     Lipid panel; Future    Need for pneumococcal vaccination  -     Cancel: Pneumococcal Conjugate Vaccine (13 Valent) (IM)    Polyp of colon, unspecified part of colon, unspecified type

## 2018-01-24 ENCOUNTER — HOSPITAL ENCOUNTER (OUTPATIENT)
Dept: RADIOLOGY | Facility: HOSPITAL | Age: 68
Discharge: HOME | End: 2018-01-24
Attending: FAMILY MEDICINE
Payer: MEDICARE

## 2018-01-24 DIAGNOSIS — Z87.891 FORMER SMOKER: ICD-10-CM

## 2018-01-24 PROCEDURE — 76775 US EXAM ABDO BACK WALL LIM: CPT | Mod: TC

## 2018-01-24 PROCEDURE — 76775 US EXAM ABDO BACK WALL LIM: CPT | Mod: 26,GC,, | Performed by: RADIOLOGY

## 2018-02-02 ENCOUNTER — TELEPHONE (OUTPATIENT)
Dept: INTERNAL MEDICINE | Facility: CLINIC | Age: 68
End: 2018-02-02

## 2018-03-08 ENCOUNTER — PATIENT OUTREACH (OUTPATIENT)
Dept: ADMINISTRATIVE | Facility: HOSPITAL | Age: 68
End: 2018-03-08

## 2018-03-08 NOTE — PROGRESS NOTES
[facility] is committed to your overall health.  To help you get the most out of each of your visits, we will review your information to make sure you are up to date on all of your recommended tests and/or procedures.       Your PCP  Sophie Yan MD   found that you may be due for:       Health Maintenance Due   Topic Date Due    Pneumococcal (65+) (1 of 2 - PCV13) 11/08/2017             If you have had any of the above done at another facility, please bring the records or information with you so that your record at [facility] will be complete.  If you would like to schedule any of these, please contact me.     If you are currently taking medication, please bring it with you to your appointment for review.     Also, if you have any type of Advanced Directives, please bring them with you to your office visit so we may scan them into your chart.       Thank you for Choosing [facility] for your healthcare needs.        Additional Information  If you have questions, you can email [email or web address] or call [phone]  to talk to our [facility] staff. Remember, [facility] is NOT to be used for urgent needs. For medical emergencies, dial 911.

## 2018-03-12 ENCOUNTER — LAB VISIT (OUTPATIENT)
Dept: LAB | Facility: HOSPITAL | Age: 68
End: 2018-03-12
Attending: FAMILY MEDICINE
Payer: MEDICARE

## 2018-03-12 DIAGNOSIS — E11.40 TYPE 2 DIABETES MELLITUS WITH DIABETIC NEUROPATHY, WITHOUT LONG-TERM CURRENT USE OF INSULIN: ICD-10-CM

## 2018-03-12 DIAGNOSIS — E78.5 HYPERLIPIDEMIA, UNSPECIFIED HYPERLIPIDEMIA TYPE: ICD-10-CM

## 2018-03-12 LAB
CHOLEST SERPL-MCNC: 106 MG/DL
CHOLEST/HDLC SERPL: 3.2 {RATIO}
ESTIMATED AVG GLUCOSE: 200 MG/DL
HBA1C MFR BLD HPLC: 8.6 %
HDLC SERPL-MCNC: 33 MG/DL
HDLC SERPL: 31.1 %
LDLC SERPL CALC-MCNC: 57.8 MG/DL
NONHDLC SERPL-MCNC: 73 MG/DL
TRIGL SERPL-MCNC: 76 MG/DL
TSH SERPL DL<=0.005 MIU/L-ACNC: 0.79 UIU/ML

## 2018-03-12 PROCEDURE — 80061 LIPID PANEL: CPT

## 2018-03-12 PROCEDURE — 36415 COLL VENOUS BLD VENIPUNCTURE: CPT

## 2018-03-12 PROCEDURE — 84443 ASSAY THYROID STIM HORMONE: CPT

## 2018-03-12 PROCEDURE — 83036 HEMOGLOBIN GLYCOSYLATED A1C: CPT

## 2018-03-15 ENCOUNTER — OFFICE VISIT (OUTPATIENT)
Dept: INTERNAL MEDICINE | Facility: CLINIC | Age: 68
End: 2018-03-15
Payer: MEDICARE

## 2018-03-15 VITALS
HEART RATE: 82 BPM | OXYGEN SATURATION: 97 % | HEIGHT: 71 IN | WEIGHT: 249.88 LBS | DIASTOLIC BLOOD PRESSURE: 92 MMHG | BODY MASS INDEX: 34.98 KG/M2 | SYSTOLIC BLOOD PRESSURE: 164 MMHG

## 2018-03-15 DIAGNOSIS — G47.33 OSA (OBSTRUCTIVE SLEEP APNEA): ICD-10-CM

## 2018-03-15 DIAGNOSIS — E11.40 TYPE 2 DIABETES MELLITUS WITH DIABETIC NEUROPATHY, WITHOUT LONG-TERM CURRENT USE OF INSULIN: ICD-10-CM

## 2018-03-15 DIAGNOSIS — I10 ESSENTIAL HYPERTENSION: Primary | ICD-10-CM

## 2018-03-15 DIAGNOSIS — Z23 NEED FOR PNEUMOCOCCAL VACCINATION: ICD-10-CM

## 2018-03-15 PROCEDURE — 99213 OFFICE O/P EST LOW 20 MIN: CPT | Mod: PBBFAC,PN,25 | Performed by: FAMILY MEDICINE

## 2018-03-15 PROCEDURE — 99999 PR PBB SHADOW E&M-EST. PATIENT-LVL III: CPT | Mod: PBBFAC,,, | Performed by: FAMILY MEDICINE

## 2018-03-15 PROCEDURE — G0009 ADMIN PNEUMOCOCCAL VACCINE: HCPCS | Mod: PBBFAC,PN

## 2018-03-15 PROCEDURE — 99214 OFFICE O/P EST MOD 30 MIN: CPT | Mod: S$PBB,,, | Performed by: FAMILY MEDICINE

## 2018-03-15 NOTE — PROGRESS NOTES
After obtaining consent, and per orders of Dr. Yan, injection of prevnar 13 Lot [id number] Exp 9/19 given in the LD by MARIBEL MIRAMONTES. Patient tolerated well and band aid applied. Patient instructed to remain in clinic for 15 minutes afterwards, and to report any adverse reaction to me immediately.

## 2018-03-15 NOTE — PROGRESS NOTES
"Subjective:       Patient ID: Caesar Arevalo is a 67 y.o. male.    Chief Complaint: Follow-up (HTN, DM)    HPI  66 y/o former smoker with DM2, HTN, Vit D def, CLBP, colon polyp (tubular adenoma) is here for follow up HTN, he did not take his medications today. He recently purchased a "total gym", hopes to get more active.  Denies f/body aches/n/v/d/constipation/sob/cp/urinary sx.  Sleeping ok, due for follow up with sleep clinic. Appetite ok.  DM2: a1c 8.6, he is checking 3 times a day, low 170 high 217, on statin, ace, trajenta, eye exam utd and foot exam utd, discussed that he is not at goal,   HTN: did not take meds today    Review of Systems   Constitutional: Negative for activity change, appetite change, fatigue and fever.   Respiratory: Negative for cough and shortness of breath.    Cardiovascular: Negative for chest pain, palpitations and leg swelling.   Gastrointestinal: Negative for constipation, diarrhea, nausea and vomiting.   Genitourinary: Negative for difficulty urinating and dysuria.   Skin: Negative for rash.   Neurological: Negative for dizziness and light-headedness.   Psychiatric/Behavioral: Negative for sleep disturbance.       Objective:      BP (!) 164/92   Pulse 82   Ht 5' 11" (1.803 m)   Wt 113.3 kg (249 lb 14.3 oz)   SpO2 97%   BMI 34.85 kg/m²   Physical Exam   Constitutional: He appears well-developed and well-nourished.   HENT:   Head: Normocephalic and atraumatic.   Mouth/Throat: No oropharyngeal exudate.   Neck: Normal range of motion. Neck supple. No thyromegaly present.   Cardiovascular: Normal rate, regular rhythm and normal heart sounds.    Pulmonary/Chest: Effort normal and breath sounds normal. No respiratory distress.   Musculoskeletal: He exhibits no edema.   Lymphadenopathy:     He has no cervical adenopathy.   Neurological: He is alert.   Skin: Skin is warm and dry.   Psychiatric: He has a normal mood and affect.   Nursing note and vitals reviewed.      Assessment:     "   1. Essential hypertension    2. Need for pneumococcal vaccination    3. Type 2 diabetes mellitus with diabetic neuropathy, without long-term current use of insulin    4. RODERICK (obstructive sleep apnea)        Plan:   Caesar was seen today for follow-up.    Diagnoses and all orders for this visit:    Essential hypertension    Need for pneumococcal vaccination  -     Pneumococcal Conjugate Vaccine (13 Valent) (IM)    Type 2 diabetes mellitus with diabetic neuropathy, without long-term current use of insulin  -     Hemoglobin A1c; Future    RODERICK (obstructive sleep apnea)  -     Ambulatory consult to Sleep Disorders    declined adding any more diabetic medications, says he can improve in the next 2 months, says he will try to go to diabetic ed

## 2018-03-28 ENCOUNTER — CLINICAL SUPPORT (OUTPATIENT)
Dept: INTERNAL MEDICINE | Facility: CLINIC | Age: 68
End: 2018-03-28
Payer: MEDICARE

## 2018-03-28 ENCOUNTER — TELEPHONE (OUTPATIENT)
Dept: INTERNAL MEDICINE | Facility: CLINIC | Age: 68
End: 2018-03-28

## 2018-03-28 VITALS — SYSTOLIC BLOOD PRESSURE: 124 MMHG | DIASTOLIC BLOOD PRESSURE: 82 MMHG

## 2018-03-28 NOTE — TELEPHONE ENCOUNTER
Pt came in for BP check today.     Blood pressure reading after 15 minutes was 124/82 Pulse 112. Pt's home BP machine read 155/93 . Pt's home machine is inaccurate. Pt states that he will buy a new machine and start checking his BP again at home. Pt is schedule for 4/11/18 to recheck his BP again and to check his new machine.     Does patient have record of home blood pressure readings yes. Readings have been averaging 130's/90's.   Last dose of blood pressure medication was taken at 12:30 Pm.  Patient is asymptomatic.

## 2018-03-28 NOTE — PROGRESS NOTES
Caesar Arevalo 67 y.o. male is here today for Blood Pressure check.   History of HTN yes.    Review of patient's allergies indicates:  No Known Allergies  Creatinine   Date Value Ref Range Status   01/08/2018 1.4 0.5 - 1.4 mg/dL Final     Sodium   Date Value Ref Range Status   01/08/2018 137 136 - 145 mmol/L Final     Potassium   Date Value Ref Range Status   01/08/2018 4.5 3.5 - 5.1 mmol/L Final   ]  Patient verifies taking blood pressure medications on a regular bases at the same time of the day.     Current Outpatient Prescriptions:     amLODIPine (NORVASC) 10 MG tablet, TAKE 1 TABLET EVERY MORNING, Disp: 90 tablet, Rfl: 1    aspirin (ECOTRIN) 81 MG EC tablet, Take 81 mg by mouth once daily., Disp: , Rfl:     atorvastatin (LIPITOR) 20 MG tablet, Take 1 tablet (20 mg total) by mouth every morning., Disp: 90 tablet, Rfl: 1    blood sugar diagnostic Strp, 1 strip by Misc.(Non-Drug; Combo Route) route 3 (three) times daily. freestyle, Disp: 300 strip, Rfl: 6    blood-glucose meter (FREESTYLE SYSTEM KIT) kit, Use as instructed, Disp: 1 each, Rfl: 0    cholecalciferol, vitamin D3, (VITAMIN D3) 1,000 unit capsule, Take 1,000 Units by mouth once daily., Disp: , Rfl:     lancets (FREESTYLE LANCETS) 28 gauge Misc, 1 lancet by Other route 3 (three) times daily., Disp: 100 each, Rfl: 5    linagliptin (TRADJENTA) 5 mg Tab tablet, Take 1 tablet (5 mg total) by mouth once daily., Disp: 90 tablet, Rfl: 1    lisinopril 10 MG tablet, TAKE 1 TABLET DAILY, Disp: 90 tablet, Rfl: 1    loratadine (CLARITIN) 10 mg tablet, TAKE 1 TABLET DAILY, Disp: 90 tablet, Rfl: 1    metFORMIN (GLUCOPHAGE) 1000 MG tablet, TAKE 1 TABLET TWICE A DAY WITH MEALS, Disp: 180 tablet, Rfl: 1    multivit-min-folic-vit K-lycop (ONE-A-DAY MEN'S 50 PLUS) 400- mcg Tab, Take by mouth., Disp: , Rfl:     naftifine 2 % Gel, Apply 1 application topically once daily., Disp: 60 g, Rfl: 2    omega-3 fatty acids (FISH OIL) 300 mg Cap, Take 300 mg by  mouth., Disp: , Rfl:   Does patient have record of home blood pressure readings yes. Readings have been averaging 130's/90's.   Last dose of blood pressure medication was taken at 12:30 Pm.  Patient is asymptomatic.     Blood pressure reading after 15 minutes was 124/82 Pulse 112. Pt's home BP machine read 155/93 . Pt's home machine is inaccurate. Pt states that he will buy a new machine and start checking his BP again at home. Pt is schedule for 4/11/18 to recheck his BP again and to check his new machine.   Dr. Yan notified.

## 2018-04-11 ENCOUNTER — TELEPHONE (OUTPATIENT)
Dept: INTERNAL MEDICINE | Facility: CLINIC | Age: 68
End: 2018-04-11

## 2018-04-11 ENCOUNTER — CLINICAL SUPPORT (OUTPATIENT)
Dept: INTERNAL MEDICINE | Facility: CLINIC | Age: 68
End: 2018-04-11
Payer: MEDICARE

## 2018-04-11 VITALS — DIASTOLIC BLOOD PRESSURE: 88 MMHG | SYSTOLIC BLOOD PRESSURE: 148 MMHG | HEART RATE: 89 BPM

## 2018-04-11 DIAGNOSIS — I10 ESSENTIAL HYPERTENSION: ICD-10-CM

## 2018-04-11 PROCEDURE — 99999 PR PBB SHADOW E&M-EST. PATIENT-LVL I: CPT | Mod: PBBFAC,,,

## 2018-04-11 PROCEDURE — 99211 OFF/OP EST MAY X REQ PHY/QHP: CPT | Mod: PBBFAC,PN

## 2018-04-11 RX ORDER — LISINOPRIL 20 MG/1
20 TABLET ORAL DAILY
Qty: 90 TABLET | Refills: 1 | Status: SHIPPED | OUTPATIENT
Start: 2018-04-11 | End: 2018-05-15 | Stop reason: SDUPTHER

## 2018-04-11 NOTE — TELEPHONE ENCOUNTER
Pt came in for BP check today.    Blood pressure reading was 148/88, Pulse 89. Pt's home machine read 155/94 HR 85. Pt's homes machine is accurate. Pt is [facility] any [facility]    Does patient have record of home blood pressure readings yes. Readings have been averaging 140's/80's.   Last dose of blood pressure medication was taken at 7 pm last night. Pt is taking lisinopril 10 mg and amlodipine 10 mg.  Patient is asymptomatic.

## 2018-04-11 NOTE — TELEPHONE ENCOUNTER
Given that patient's home readings were still in the 140s over 80s I recommend we increase his lisinopril to 20 mg daily and repeat a nurse visit in 2 weeks.

## 2018-04-12 NOTE — TELEPHONE ENCOUNTER
----- Message from Lucina Gaspar sent at 4/12/2018  9:12 AM CDT -----  Contact: Pt    Name of Who is Calling: SVETLANA HARRELL [[id number]]      What is the request in detail: Patient states he is returning a call       Can the clinic reply by [facility]: No      What Number [facility] Back [facility]: [phone]

## 2018-05-01 ENCOUNTER — PATIENT OUTREACH (OUTPATIENT)
Dept: ADMINISTRATIVE | Facility: HOSPITAL | Age: 68
End: 2018-05-01

## 2018-05-01 NOTE — PROGRESS NOTES
[facility] is committed to your overall health.  To help you get the most out of each of your visits, we will review your information to make sure you are up to date on all of your recommended tests and/or procedures.       Your PCP  Sophie Yan MD   found that you may be due for:       Health Maintenance Due   Topic Date Due    Foot Exam  06/22/2018             If you have had any of the above done at another facility, please bring the records or information with you so that your record at [facility] will be complete.  If you would like to schedule any of these, please contact me.     If you are currently taking medication, please bring it with you to your appointment for review.     Also, if you have any type of Advanced Directives, please bring them with you to your office visit so we may scan them into your chart.       Thank you for Choosing [facility] for your healthcare needs.        Additional Information  If you have questions, you can email [email or web address] or call [phone]  to talk to our [facility] staff. Remember, [facility] is NOT to be used for urgent needs. For medical emergencies, dial 911.

## 2018-05-08 ENCOUNTER — LAB VISIT (OUTPATIENT)
Dept: LAB | Facility: HOSPITAL | Age: 68
End: 2018-05-08
Attending: FAMILY MEDICINE
Payer: MEDICARE

## 2018-05-08 DIAGNOSIS — E11.40 TYPE 2 DIABETES MELLITUS WITH DIABETIC NEUROPATHY, WITHOUT LONG-TERM CURRENT USE OF INSULIN: ICD-10-CM

## 2018-05-08 LAB
ESTIMATED AVG GLUCOSE: 200 MG/DL
HBA1C MFR BLD HPLC: 8.6 %

## 2018-05-08 PROCEDURE — 36415 COLL VENOUS BLD VENIPUNCTURE: CPT

## 2018-05-08 PROCEDURE — 83036 HEMOGLOBIN GLYCOSYLATED A1C: CPT

## 2018-05-15 ENCOUNTER — OFFICE VISIT (OUTPATIENT)
Dept: INTERNAL MEDICINE | Facility: CLINIC | Age: 68
End: 2018-05-15
Payer: MEDICARE

## 2018-05-15 ENCOUNTER — APPOINTMENT (OUTPATIENT)
Dept: LAB | Facility: HOSPITAL | Age: 68
End: 2018-05-15
Attending: FAMILY MEDICINE
Payer: MEDICARE

## 2018-05-15 ENCOUNTER — TELEPHONE (OUTPATIENT)
Dept: INTERNAL MEDICINE | Facility: CLINIC | Age: 68
End: 2018-05-15

## 2018-05-15 VITALS
HEIGHT: 71 IN | BODY MASS INDEX: 33.6 KG/M2 | DIASTOLIC BLOOD PRESSURE: 90 MMHG | WEIGHT: 240 LBS | OXYGEN SATURATION: 93 % | HEART RATE: 92 BPM | SYSTOLIC BLOOD PRESSURE: 130 MMHG

## 2018-05-15 DIAGNOSIS — R35.0 BENIGN PROSTATIC HYPERPLASIA WITH URINARY FREQUENCY: ICD-10-CM

## 2018-05-15 DIAGNOSIS — R35.0 URINARY FREQUENCY: ICD-10-CM

## 2018-05-15 DIAGNOSIS — I10 ESSENTIAL HYPERTENSION: ICD-10-CM

## 2018-05-15 DIAGNOSIS — N40.1 BENIGN PROSTATIC HYPERPLASIA WITH URINARY FREQUENCY: ICD-10-CM

## 2018-05-15 DIAGNOSIS — E11.40 TYPE 2 DIABETES MELLITUS WITH DIABETIC NEUROPATHY, WITHOUT LONG-TERM CURRENT USE OF INSULIN: Primary | ICD-10-CM

## 2018-05-15 PROCEDURE — 99999 PR PBB SHADOW E&M-EST. PATIENT-LVL V: CPT | Mod: PBBFAC,,, | Performed by: FAMILY MEDICINE

## 2018-05-15 PROCEDURE — 99214 OFFICE O/P EST MOD 30 MIN: CPT | Mod: S$PBB,,, | Performed by: FAMILY MEDICINE

## 2018-05-15 PROCEDURE — 99215 OFFICE O/P EST HI 40 MIN: CPT | Mod: PBBFAC,PN | Performed by: FAMILY MEDICINE

## 2018-05-15 RX ORDER — LISINOPRIL 30 MG/1
30 TABLET ORAL DAILY
Qty: 90 TABLET | Refills: 1 | Status: SHIPPED | OUTPATIENT
Start: 2018-05-15 | End: 2018-09-17

## 2018-05-15 NOTE — PROGRESS NOTES
"Subjective:       Patient ID: Caesar Arevalo is a 67 y.o. male.    Chief Complaint: Follow-up    HPI  68 y/o former smoker with DM2, HTN, BPH, Vit D def, CLBP, colon polyp (tubular adenoma) is here for follow up HTN, he did not take his medications today. He recently purchased a "total gym", hopes to get more active.  Denies f/body aches/n/v/d/constipation/sob/cp, has urinary frequency, denies dysuria/urgency.  Sleeping ok, due for follow up with sleep clinic. Appetite ok.  DM2: a1c 8.6 the same, says he has been trying to do better, he is checking 3 times a day, low 150 high 190, on statin, ace, trajenta, eye exam utd and foot exam utd, discussed that he is not at goal, did not make diabetic ed appt  HTN: a little above goal, did not take medications, today increased lisinopril to 20 mg in April, home log average 130's over 80's pulse   Started bowling, no longer riding his bike  BPH: Hx of prostate procedure, followed by outside urologist, last visit with psa in November scanned in media tab, was on finasteride, oxybutinin and vesicare in the past, no longer on these meds, cannot remember why he stopped, would like to set up here    Review of Systems   Constitutional: Negative for activity change, appetite change, fatigue and fever.   Respiratory: Negative for cough and shortness of breath.    Cardiovascular: Negative for chest pain, palpitations and leg swelling.   Gastrointestinal: Negative for constipation, diarrhea, nausea and vomiting.   Genitourinary: Negative for difficulty urinating and dysuria.   Skin: Negative for rash.   Neurological: Negative for dizziness and light-headedness.   Psychiatric/Behavioral: Negative for sleep disturbance.       Objective:      BP (!) 130/90 (BP Location: Left arm, Patient Position: Sitting, BP Method: Large (Manual))   Pulse 92   Ht 5' 11" (1.803 m)   Wt 108.8 kg (239 lb 15.5 oz)   SpO2 (!) 93%   BMI 33.47 kg/m²   Physical Exam   Constitutional: He appears " well-developed and well-nourished.   HENT:   Head: Normocephalic and atraumatic.   Mouth/Throat: No oropharyngeal exudate.   Neck: Normal range of motion. Neck supple. No thyromegaly present.   Cardiovascular: Normal rate, regular rhythm and normal heart sounds.    Pulmonary/Chest: Effort normal and breath sounds normal. No respiratory distress.   Musculoskeletal: He exhibits no edema.   Lymphadenopathy:     He has no cervical adenopathy.   Neurological: He is alert.   Skin: Skin is warm and dry.   Psychiatric: He has a normal mood and affect.   Nursing note and vitals reviewed.      Assessment:       1. Type 2 diabetes mellitus with diabetic neuropathy, without long-term current use of insulin    2. Essential hypertension    3. Benign prostatic hyperplasia with urinary frequency    4. Urinary frequency        Plan:   Caesar was seen today for follow-up.    Diagnoses and all orders for this visit:    Type 2 diabetes mellitus with diabetic neuropathy, without long-term current use of insulin  -     Hemoglobin A1c; Future    Essential hypertension  -     Hemoglobin A1c; Future  -     CBC auto differential; Future  -     Comprehensive metabolic panel; Future  -     lisinopril (PRINIVIL,ZESTRIL) 30 MG tablet; Take 1 tablet (30 mg total) by mouth once daily.    Benign prostatic hyperplasia with urinary frequency  -     Ambulatory referral to Urology    Urinary frequency  -     Urinalysis; Future  -     Urine culture; Future    discussed

## 2018-05-15 NOTE — TELEPHONE ENCOUNTER
Called rebecca in  Epic support she states that pt meets criteria and will look into why he cant sign up.

## 2018-05-21 ENCOUNTER — OFFICE VISIT (OUTPATIENT)
Dept: UROLOGY | Facility: CLINIC | Age: 68
End: 2018-05-21
Payer: MEDICARE

## 2018-05-21 VITALS
SYSTOLIC BLOOD PRESSURE: 141 MMHG | BODY MASS INDEX: 33.82 KG/M2 | WEIGHT: 242.5 LBS | DIASTOLIC BLOOD PRESSURE: 81 MMHG | HEART RATE: 94 BPM

## 2018-05-21 DIAGNOSIS — N40.0 BPH WITHOUT OBSTRUCTION/LOWER URINARY TRACT SYMPTOMS: Primary | ICD-10-CM

## 2018-05-21 LAB — POC RESIDUAL URINE VOLUME: NORMAL ML (ref 0–100)

## 2018-05-21 PROCEDURE — 51798 US URINE CAPACITY MEASURE: CPT | Mod: PBBFAC | Performed by: NURSE PRACTITIONER

## 2018-05-21 PROCEDURE — 99999 PR PBB SHADOW E&M-EST. PATIENT-LVL III: CPT | Mod: PBBFAC,,, | Performed by: NURSE PRACTITIONER

## 2018-05-21 PROCEDURE — 99203 OFFICE O/P NEW LOW 30 MIN: CPT | Mod: S$PBB,,, | Performed by: NURSE PRACTITIONER

## 2018-05-21 PROCEDURE — 81002 URINALYSIS NONAUTO W/O SCOPE: CPT | Mod: PBBFAC | Performed by: NURSE PRACTITIONER

## 2018-05-21 PROCEDURE — 99213 OFFICE O/P EST LOW 20 MIN: CPT | Mod: PBBFAC,25 | Performed by: NURSE PRACTITIONER

## 2018-05-21 RX ORDER — TAMSULOSIN HYDROCHLORIDE 0.4 MG/1
0.4 CAPSULE ORAL NIGHTLY
Qty: 90 CAPSULE | Refills: 0 | Status: SHIPPED | OUTPATIENT
Start: 2018-05-21 | End: 2018-07-16 | Stop reason: SDUPTHER

## 2018-05-21 NOTE — PATIENT INSTRUCTIONS
Avoid Bladder Irritants: Tea, coffee, caffeine, alcohol, artificial sweeteners, citrus, spicy foods, acidic foods,chocolate, tomato-based foods, smoking    Diet modifications: reducing PM fluids, limit salt intake, no caffeine intake after 3 pm    Elevating his feet couple of hours prior to bedtime. Void before bed.      Start flomax. Take nightly. If experience dizziness notify clinic

## 2018-05-21 NOTE — LETTER
May 21, 2018      Sophie Yan MD  [address]  [address]  [place] [address]           [facility] - Urology 4th Floor  [address]  [place] [address]  Phone: [phone]          Patient: Caesar Arevalo   MR Number: [id number]   YOB: 1950   Date of Visit: 5/21/2018       Dear Dr. Sophie Yan:    Thank you for referring Caesar Arevalo to me for evaluation. Attached you will find relevant portions of my assessment and plan of care.    If you have questions, please do not hesitate to call me. I look forward to following Caesar Arevalo along with you.    Sincerely,    Mary Onofre, NP    Enclosure  CC:  No Recipients    If you would like to receive this communication electronically, please contact [email or web address] or [phone] to request more information on [facility] access.    For providers and/or their staff who would like to refer a patient to [facility], please contact us through our one-stop-shop provider referral line, [facility], at [phone].    If you feel you have received this communication in error or would no longer like to receive these types of communications, please e-mail [email or web address]

## 2018-05-21 NOTE — PROGRESS NOTES
Subjective:       Patient ID: Caesar Arevalo is a 67 y.o. male.    Chief Complaint: Urinary Frequency      HPI: Caesar Arevalo is a 67 y.o. Black or  male who presents today for evaluation and management of urinary frequency. [facility] but a new patient to me. This is his initial clinic visit.     He was seen by Dr. He at [facility]. Pt reports he had some type of prostate surgery by him but is unsure of when it was. It improved his urinary symptoms but now they are worsening again.   Last PSA 10/3/17 0.6, records in media.    Today he presents to clinic for urinary frequency. He reports daytime frequency every 2 hours and nocturia 3-4 x per night. He c/o weak, intermittent stream. Denies straining to start flow. He denies urgency, incontinence, dysuria, hematuria or flank pain. He denies f/c/n/v. He drinks coffee and water mostly and on occasion drinks soda. He is type 2 diabetic with A1C 8.6. He reports his cousin had prostate cancer but denies any personal history of prostate cancer.    Review of patient's allergies indicates:  No Known Allergies    Current Outpatient Prescriptions   Medication Sig Dispense Refill    amLODIPine (NORVASC) 10 MG tablet TAKE 1 TABLET EVERY MORNING 90 tablet 1    aspirin (ECOTRIN) 81 MG EC tablet Take 81 mg by mouth once daily.      atorvastatin (LIPITOR) 20 MG tablet Take 1 tablet (20 mg total) by mouth every morning. 90 tablet 1    blood sugar diagnostic Strp 1 strip by Misc.(Non-Drug; Combo Route) route 3 (three) times daily. freestyle 300 strip 6    blood-glucose meter (FREESTYLE SYSTEM KIT) kit Use as instructed 1 each 0    cholecalciferol, vitamin D3, (VITAMIN D3) 1,000 unit capsule Take 1,000 Units by mouth once daily.      lancets (FREESTYLE LANCETS) 28 gauge Misc 1 lancet by Other route 3 (three) times daily. 100 each 5    linagliptin (TRADJENTA) 5 mg Tab tablet Take 1 tablet (5 mg total) by mouth once daily.  90 tablet 1    lisinopril (PRINIVIL,ZESTRIL) 30 MG tablet Take 1 tablet (30 mg total) by mouth once daily. 90 tablet 1    loratadine (CLARITIN) 10 mg tablet TAKE 1 TABLET DAILY 90 tablet 1    metFORMIN (GLUCOPHAGE) 1000 MG tablet TAKE 1 TABLET TWICE A DAY WITH MEALS 180 tablet 1    multivit-min-folic-vit K-lycop (ONE-A-DAY MEN'S 50 PLUS) 400- mcg Tab Take by mouth.      naftifine 2 % Gel Apply 1 application topically once daily. 60 g 2    omega-3 fatty acids (FISH OIL) 300 mg Cap Take 300 mg by mouth.      tamsulosin (FLOMAX) 0.4 mg Cp24 Take 1 capsule (0.4 mg total) by mouth every evening. 90 capsule 0     No current facility-administered medications for this visit.        Past Medical History:   Diagnosis Date    Bell's palsy     1980's    Diabetes mellitus, type 2     Hypertension     TB lung, latent 1980    s/p treatment       Past Surgical History:   Procedure Laterality Date    COLONOSCOPY N/A 7/25/2016    Procedure: COLONOSCOPY;  Surgeon: RICARDO Mclain MD;  Location: [address]);  Service: Endoscopy;  Laterality: N/A;    HERNIA REPAIR  04/2016    norris    KNEE LIGAMENT RECONSTRUCTION      plantar warts      PROSTATE SURGERY         Family History   Problem Relation Age of Onset    Diabetes Mother     Hypertension Mother     Heart disease Mother     Diabetes Sister     Cancer Neg Hx     Stroke Neg Hx        Review of Systems   Constitutional: Negative for chills, diaphoresis, fatigue and fever.   HENT: Negative for congestion and trouble swallowing.    Eyes: Negative for visual disturbance.   Respiratory: Negative for chest tightness and shortness of breath.    Cardiovascular: Negative for chest pain and palpitations.   Gastrointestinal: Negative for abdominal pain, constipation, diarrhea, nausea and vomiting.   Genitourinary: Positive for frequency. Negative for decreased urine volume, difficulty urinating, discharge, dysuria, flank pain, hematuria, penile pain,  penile swelling, scrotal swelling, testicular pain and urgency.   Musculoskeletal: Negative for back pain and gait problem.   Skin: Negative for pallor and rash.   Allergic/Immunologic: Negative for immunocompromised state.   Neurological: Negative for dizziness, seizures, syncope, weakness, light-headedness and headaches.   Hematological: Negative for adenopathy.   Psychiatric/Behavioral: Negative for confusion. The patient is not nervous/anxious.          All other systems were reviewed and were negative.    Objective:     Vitals:    05/21/18 1050   BP: (!) 141/81   Pulse: 94        Physical Exam   Nursing note and vitals reviewed.  Constitutional: He is oriented to person, place, and time. He appears well-developed and well-nourished.  Non-toxic appearance. He does not have a sickly appearance. He does not appear ill. No distress.   HENT:   Head: Normocephalic and atraumatic.   Eyes: Conjunctivae and EOM are normal.   Neck: Normal range of motion.   Cardiovascular: Normal rate and regular rhythm.    Pulmonary/Chest: Effort normal. No respiratory distress.   Abdominal: Soft. He exhibits no distension.   Genitourinary: Right testis shows no mass, no swelling and no tenderness. Left testis shows no mass, no swelling and no tenderness. No penile erythema or penile tenderness. No discharge found.       Musculoskeletal: Normal range of motion.   Neurological: He is alert and oriented to person, place, and time.   Skin: Skin is warm and dry.     Psychiatric: He has a normal mood and affect. His behavior is normal. Judgment and thought content normal.         Lab Results   Component Value Date    CREATININE 1.4 01/08/2018     Lab Results   Component Value Date    EGFRNONAA 52 (A) 01/08/2018     Lab Results   Component Value Date    ESTGFRAFRICA >60 01/08/2018     Urine dipstick in clinic: sp grav 1.015, pH 5, 50 glucose, otherwise negative    PVR: done in clinic with bladder scanner by Nurse Hammad was 71  ml.    Assessment:       1. BPH without obstruction/lower urinary tract symptoms        Plan:     Caesar was seen today for urinary frequency.    Diagnoses and all orders for this visit:    BPH without obstruction/lower urinary tract symptoms  -     POCT urinalysis, dipstick or tablet reag  -     POCT Bladder Scan  -     tamsulosin (FLOMAX) 0.4 mg Cp24; Take 1 capsule (0.4 mg total) by mouth every evening.    -Discussed plan of care with patient  -Education and recommendations of today's plan of care including home remedies.  Discussed the in office findings; reassurance with no infection and emptying his bladder.  Discussed the contributing factors for his LUTS.  Diet modifications: reducing PM liquids, limit salt intake, no caffeine intake after 3 pm  Avoid Bladder Irritants: Tea, coffee, caffeine, alcohol, artificial sweeteners, citrus, spicy foods, acidic foods,chocolate, tomato-based foods, smoking  Elevating his feet couple of hours prior to bedtime. Void before bed.  -Start flomax. Discussed side effects, indications, and MOA for flomax. Prescription sent to the pharmacy. Pt verbalized understanding.  If experience dizziness, stop medication and notify clinic  -Discussed uncontrolled diabetes and affects on bladder. Pt verbalized understanding  -Follow up in 8 weeks      I spent 35 minutes with the patient of which more than half was spent in coordinating the patient's care as well as in direct consultation with the patient in regards to our treatment and plan.

## 2018-05-22 DIAGNOSIS — J30.9 ALLERGIC RHINITIS: ICD-10-CM

## 2018-05-22 RX ORDER — LORATADINE 10 MG/1
TABLET ORAL
Qty: 90 TABLET | Refills: 1 | Status: SHIPPED | OUTPATIENT
Start: 2018-05-22 | End: 2018-12-18 | Stop reason: SDUPTHER

## 2018-05-25 ENCOUNTER — LAB VISIT (OUTPATIENT)
Dept: LAB | Facility: HOSPITAL | Age: 68
End: 2018-05-25
Attending: FAMILY MEDICINE
Payer: MEDICARE

## 2018-05-25 ENCOUNTER — CLINICAL SUPPORT (OUTPATIENT)
Dept: DIABETES | Facility: CLINIC | Age: 68
End: 2018-05-25
Payer: MEDICARE

## 2018-05-25 VITALS — WEIGHT: 241.19 LBS | BODY MASS INDEX: 33.64 KG/M2

## 2018-05-25 DIAGNOSIS — I10 ESSENTIAL HYPERTENSION: ICD-10-CM

## 2018-05-25 DIAGNOSIS — E11.40 TYPE 2 DIABETES MELLITUS WITH DIABETIC NEUROPATHY, WITHOUT LONG-TERM CURRENT USE OF INSULIN: ICD-10-CM

## 2018-05-25 LAB
ALBUMIN SERPL BCP-MCNC: 4.1 G/DL
ALP SERPL-CCNC: 74 U/L
ALT SERPL W/O P-5'-P-CCNC: 29 U/L
ANION GAP SERPL CALC-SCNC: 10 MMOL/L
AST SERPL-CCNC: 19 U/L
BASOPHILS # BLD AUTO: 0.05 K/UL
BASOPHILS NFR BLD: 0.7 %
BILIRUB SERPL-MCNC: 0.7 MG/DL
BUN SERPL-MCNC: 13 MG/DL
CALCIUM SERPL-MCNC: 9.2 MG/DL
CHLORIDE SERPL-SCNC: 103 MMOL/L
CO2 SERPL-SCNC: 26 MMOL/L
CREAT SERPL-MCNC: 1.4 MG/DL
DIFFERENTIAL METHOD: ABNORMAL
EOSINOPHIL # BLD AUTO: 0.4 K/UL
EOSINOPHIL NFR BLD: 5.3 %
ERYTHROCYTE [DISTWIDTH] IN BLOOD BY AUTOMATED COUNT: 14.1 %
EST. GFR  (AFRICAN AMERICAN): >60 ML/MIN/1.73 M^2
EST. GFR  (NON AFRICAN AMERICAN): 52.4 ML/MIN/1.73 M^2
ESTIMATED AVG GLUCOSE: 194 MG/DL
GLUCOSE SERPL-MCNC: 149 MG/DL
HBA1C MFR BLD HPLC: 8.4 %
HCT VFR BLD AUTO: 42.9 %
HGB BLD-MCNC: 14.9 G/DL
IMM GRANULOCYTES # BLD AUTO: 0.04 K/UL
IMM GRANULOCYTES NFR BLD AUTO: 0.6 %
LYMPHOCYTES # BLD AUTO: 1.8 K/UL
LYMPHOCYTES NFR BLD: 26.1 %
MCH RBC QN AUTO: 26.2 PG
MCHC RBC AUTO-ENTMCNC: 34.7 G/DL
MCV RBC AUTO: 75 FL
MONOCYTES # BLD AUTO: 0.6 K/UL
MONOCYTES NFR BLD: 7.9 %
NEUTROPHILS # BLD AUTO: 4.1 K/UL
NEUTROPHILS NFR BLD: 59.4 %
NRBC BLD-RTO: 0 /100 WBC
PLATELET # BLD AUTO: 339 K/UL
PMV BLD AUTO: 10 FL
POTASSIUM SERPL-SCNC: 4 MMOL/L
PROT SERPL-MCNC: 7.7 G/DL
RBC # BLD AUTO: 5.69 M/UL
SODIUM SERPL-SCNC: 139 MMOL/L
WBC # BLD AUTO: 6.94 K/UL

## 2018-05-25 PROCEDURE — 85025 COMPLETE CBC W/AUTO DIFF WBC: CPT

## 2018-05-25 PROCEDURE — 83036 HEMOGLOBIN GLYCOSYLATED A1C: CPT

## 2018-05-25 PROCEDURE — 80053 COMPREHEN METABOLIC PANEL: CPT

## 2018-05-25 PROCEDURE — G0108 DIAB MANAGE TRN  PER INDIV: HCPCS | Mod: PBBFAC | Performed by: DIETITIAN, REGISTERED

## 2018-05-25 PROCEDURE — 36415 COLL VENOUS BLD VENIPUNCTURE: CPT

## 2018-05-25 NOTE — PROGRESS NOTES
"Diabetes Education  Author: Haydee Britt RD  Date: 5/25/2018    Diabetes Education Visit  Diabetes Education Record Assessment/Progress: Initial    Diabetes Type  Diabetes Type : Type II    Diabetes History  Diabetes Diagnosis: >10 years    Nutrition  Meal Planning: 3 meals per day, snacks between meal, water, diet drinks  What type of beverages do you drink?: diet soda/tea, water, juice  Meal Plan 24 Hour Recall - Breakfast: 1 cup grits, eggs, sausage, trop 50 oj (8oz)  Meal Plan 24 Hour Recall - Lunch: "whatever i cook"   Meal Plan 24 Hour Recall - Dinner: last night gumbo with rice (verbalized "pours off the starch" when cooking)  Meal Plan 24 Hour Recall - Snack: eats large snacks especially at night - last night ate sandwich cookies (half sleeve about 5-6, which he says "doesn't effect my sugar") and 2 small bags of chips    Monitoring   Self Monitoring : SMBG 2-3 times daily - fasting and sometimes 2 hours after meals/snack - no log or meter in office today - reports recent improvement in BG to 140s-170s, which does not match with A1C of 8.4  Blood Glucose Logs: No  Do you use a personal glucose monitor?: No  In the last month, how often have you had a low blood sugar reaction?: never  What are your symptoms of low blood sugar?: never experienced  Can you tell when your blood sugar is too high?: sometimes    Exercise   Exercise Type:  (bowling)    Current Diabetes Treatment   Current Treatment: Oral Medication (Tradjenta 5mg once daily, Metformin 1000mg BID)    Social History  Preferred Learning Method: Face to Face, Reading Materials  Primary Support: Self  Occupation: retired marine  Smoking Status: Ex Smoker    PHQ-2 Total Score: 0       DDS-2 Score  ( > 3 = SIGNIFICANT DISTRESS): 2                   Barriers to Change  Barriers to Change: None  Learning Challenges : None    Readiness to Learn   Readiness to Learn : Acceptance    Cultural Influences  Cultural Influences: No    Diabetes Education " Assessment/Progress    Diabetes Disease Process (diabetes disease process and treatment options): Discussion, Individual Session, Demonstrates Understanding/Competency(verbalizes/demonstrates)    Nutrition (Incorporating nutritional management into one's lifestyle): Discussion, Individual Session, Needs Review, Instructed, Written Materials Provided (Eating large, high carb snacks, reports only looking at sugars on labels and seems to have limited understanding of what foods raise BG. Reviewed sources of CHO, label reading, and focused on portion control. Encouraged eating pattern with plate method at meals - filling 1/2 plate with non-starchy vegetables, 1/4 plate with lean protein, and 1/4 plate with starch - and stressed controlling portions at snacks. Encouraged to limit snacks to 15-20g CHO snack.)    Physical Activity (incorporating physical activity into one's lifestyle): Instructed, Discussion, Individual Session, Written Materials Provided (Only activity recently is bowling. Plans to get back to bike riding on the [place]. Encouraged bike riding and discussed benefits of regular physical activity. )    Medications (states correct name, dose, onset, peak, duration, side effects & timing of meds): Discussion, Individual Session, Demonstrates Understanding/Competency(verbalizes/demonstrates), Needs Instruction (Verbalized good understanding of oral DM meds he is taking and does not miss doses. Per Dr. Yan, had discussed starting Trulicity at last PCP appt but pt not ready for medication change. I provided demonstration of Trulicity use using demo pen, discussed MOA in detail and stressed importance of making change to improve glycemic control. He verbalized will consider it if A1C today still high.)    Monitoring (monitoring blood glucose/other parameters & using results): Discussion, Individual Session, Needs Review, Instructed, Written Materials Provided, Comprehends Key Points (Reviewed goal BG readings,  SMBG BID and keeping log. Dr. Yan wants pt to get set up with Diabetes Digital [facility] but pt has not received consent forms yet. Unable to find order for diabetes digital medicine in chart. Contacted Lexis Rene to assist. )    Acute Complications (preventing, detecting, and treating acute complications): Discussion, Individual Session, Demonstrates Understanding/Competency (verbalizes/demonstrates)    Chronic Complications (preventing, detecting, and treating chronic complications): Discussion, Individual Session (Up to date on eye exam and podiatry visit. )    Clinical (diabetes, other pertinent medical history, and relevant comorbidities reviewed during visit): Discussion, Individual Session    Cognitive (knowledge of self-management skills, functional health literacy): Discussion, Individual Session    Psychosocial (emotional response to diabetes): Discussion, Individual Session    Diabetes Distress and Support Systems: Discussion, Individual Session    Behavioral (readiness for change, lifestyle practices, self-care behaviors): Individual Session    Goals  Patient has selected/evaluated goals during today's session: No (Pt declined follow-up appt)         Diabetes Care Plan/Intervention  Education Plan/Intervention: Other (Pt declined coming for follow-up CELIA Pires assisting with enrolling pt in digital med program.)    Diabetes Meal Plan  Carbohydrate Per Meal: 45-60g  Carbohydrate Per Snack : 7-15g    Education Units of Time   Time Spent: 60 min    Health Maintenance was reviewed today with patient. Discussed with patient importance of routine eye exams, foot exams/foot care, blood work (i.e.: A1c, microalbumin, and lipid), dental visits, yearly flu vaccine, and pneumonia vaccine as indicated by PCP. Patient verbalized understanding.     Health Maintenance Topics with due status: Not Due       Topic Last Completion Date    TETANUS VACCINE 06/27/2016    Colonoscopy 07/25/2016    Eye Exam  08/25/2017    Influenza Vaccine 10/16/2017    Lipid Panel 03/12/2018    Pneumococcal (65+) 03/15/2018    Hemoglobin A1c 05/08/2018    Low Dose Statin 05/15/2018     Health Maintenance Due   Topic Date Due    Foot Exam  06/22/2018

## 2018-05-25 NOTE — Clinical Note
Hi Dr. Yan,   I provided Trulicity demonstration and Mr. Arevalo said he would consider it. Also, I found out that diabetes digital medicine is not showing up in the order list d/t pt has Medicare only and Medicare will not cover the [employer] meter strips that are required for the program. He can pay out of pocket for the strips (which are quite inexpensive as far as strips go). I found this out after his appt, so I called and left him a vm message regarding this.   Best wishes,  Haydee

## 2018-05-28 ENCOUNTER — TELEPHONE (OUTPATIENT)
Dept: INTERNAL MEDICINE | Facility: CLINIC | Age: 68
End: 2018-05-28

## 2018-05-28 DIAGNOSIS — E11.40 TYPE 2 DIABETES MELLITUS WITH DIABETIC NEUROPATHY, WITHOUT LONG-TERM CURRENT USE OF INSULIN: Primary | ICD-10-CM

## 2018-06-08 ENCOUNTER — TELEPHONE (OUTPATIENT)
Dept: INTERNAL MEDICINE | Facility: CLINIC | Age: 68
End: 2018-06-08

## 2018-06-08 ENCOUNTER — OFFICE VISIT (OUTPATIENT)
Dept: PODIATRY | Facility: CLINIC | Age: 68
End: 2018-06-08
Payer: MEDICARE

## 2018-06-08 VITALS
WEIGHT: 243 LBS | DIASTOLIC BLOOD PRESSURE: 89 MMHG | BODY MASS INDEX: 34.02 KG/M2 | SYSTOLIC BLOOD PRESSURE: 140 MMHG | RESPIRATION RATE: 18 BRPM | HEIGHT: 71 IN | HEART RATE: 89 BPM

## 2018-06-08 DIAGNOSIS — E11.9 COMPREHENSIVE DIABETIC FOOT EXAMINATION, TYPE 2 DM, ENCOUNTER FOR: Primary | ICD-10-CM

## 2018-06-08 PROCEDURE — 99213 OFFICE O/P EST LOW 20 MIN: CPT | Mod: PBBFAC | Performed by: PODIATRIST

## 2018-06-08 PROCEDURE — 99213 OFFICE O/P EST LOW 20 MIN: CPT | Mod: S$PBB,,, | Performed by: PODIATRIST

## 2018-06-08 PROCEDURE — 99999 PR PBB SHADOW E&M-EST. PATIENT-LVL III: CPT | Mod: PBBFAC,,, | Performed by: PODIATRIST

## 2018-06-08 NOTE — TELEPHONE ENCOUNTER
----- Message from Mckenzie Melvin sent at 6/8/2018 10:04 AM CDT -----  Contact: SVETLANA HARRELL [[id number]]            Name of Who is Calling: SVETLANA HARRELL [[id number]]    What is the request in detail: Returning a call.    Can the clinic reply by [facility]: no    What Number [facility] Back [facility]: [phone]

## 2018-06-08 NOTE — PROGRESS NOTES
Subjective:      Patient ID: Caesar Arevalo is a 67 y.o. male.    Chief Complaint: PCP (Sophie Yan MD 5/15/18); Diabetic Foot Exam; Nail Care; Callouses; and Bunions (swelling )    Caesar is a 67 y.o. male who presents to the clinic upon referral from Dr. Marlee goyal. provider found  for evaluation and treatment of diabetic feet. Caesar has a past medical history of Bell's palsy; Diabetes mellitus, type 2; Hypertension; and TB lung, latent (1980). Patient relates no major problem with feet. Only complaints today consist of yearly DM foot examination  .    PCP: Sophie Yan MD    Date Last Seen by PCP:   Chief Complaint   Patient presents with    PCP     Sophie Yan MD 5/15/18    Diabetic Foot Exam    Nail Care    Callouses    Bunions     swelling          Current shoe gear: Casual shoes    Hemoglobin A1C   Date Value Ref Range Status   05/25/2018 8.4 (H) 4.0 - 5.6 % Final     Comment:     According to ADA guidelines, hemoglobin A1c <7.0% represents  optimal control in non-pregnant diabetic patients. Different  metrics may apply to specific patient populations.   Standards of Medical Care in Diabetes-2016.  For the purpose of screening for the presence of diabetes:  <5.7%     Consistent with the absence of diabetes  5.7-6.4%  Consistent with increasing risk for diabetes   (prediabetes)  >or=6.5%  Consistent with diabetes  Currently, no consensus exists for use of hemoglobin A1c  for diagnosis of diabetes for children.  This Hemoglobin A1c assay has significant interference with fetal   hemoglobin   (HbF). The results are invalid for patients with abnormal amounts of   HbF,   including those with known Hereditary Persistence   of Fetal Hemoglobin. Heterozygous hemoglobin variants (HbAS, HbAC,   HbAD, HbAE, HbA2) do not significantly interfere with this assay;   however, presence of multiple variants in a sample may impact the %   interference.     05/08/2018 8.6 (H) 4.0 - 5.6 % Final     Comment:      According to ADA guidelines, hemoglobin A1c <7.0% represents  optimal control in non-pregnant diabetic patients. Different  metrics may apply to specific patient populations.   Standards of Medical Care in Diabetes-2016.  For the purpose of screening for the presence of diabetes:  <5.7%     Consistent with the absence of diabetes  5.7-6.4%  Consistent with increasing risk for diabetes   (prediabetes)  >or=6.5%  Consistent with diabetes  Currently, no consensus exists for use of hemoglobin A1c  for diagnosis of diabetes for children.  This Hemoglobin A1c assay has significant interference with fetal   hemoglobin   (HbF). The results are invalid for patients with abnormal amounts of   HbF,   including those with known Hereditary Persistence   of Fetal Hemoglobin. Heterozygous hemoglobin variants (HbAS, HbAC,   HbAD, HbAE, HbA2) do not significantly interfere with this assay;   however, presence of multiple variants in a sample may impact the %   interference.     03/12/2018 8.6 (H) 4.0 - 5.6 % Final     Comment:     According to ADA guidelines, hemoglobin A1c <7.0% represents  optimal control in non-pregnant diabetic patients. Different  metrics may apply to specific patient populations.   Standards of Medical Care in Diabetes-2016.  For the purpose of screening for the presence of diabetes:  <5.7%     Consistent with the absence of diabetes  5.7-6.4%  Consistent with increasing risk for diabetes   (prediabetes)  >or=6.5%  Consistent with diabetes  Currently, no consensus exists for use of hemoglobin A1c  for diagnosis of diabetes for children.  This Hemoglobin A1c assay has significant interference with fetal   hemoglobin   (HbF). The results are invalid for patients with abnormal amounts of   HbF,   including those with known Hereditary Persistence   of Fetal Hemoglobin. Heterozygous hemoglobin variants (HbAS, HbAC,   HbAD, HbAE, HbA2) do not significantly interfere with this assay;   however, presence of multiple  variants in a sample may impact the %   interference.             Review of Systems   Constitution: Negative for chills, decreased appetite and fever.   Cardiovascular: Negative for leg swelling.   Musculoskeletal: Negative for arthritis, joint pain, joint swelling and myalgias.   Gastrointestinal: Negative for nausea and vomiting.   Neurological: Negative for loss of balance, numbness and paresthesias.           Objective:      Physical Exam   Constitutional: He appears well-developed and well-nourished.  Non-toxic appearance. He does not have a sickly appearance. No distress.   alert and oriented x 3.    Cardiovascular:   Pulses:       Dorsalis pedis pulses are 2+ on the right side, and 2+ on the left side.        Posterior tibial pulses are 2+ on the right side, and 2+ on the left side.    Capillary refill time is within normal limits. Digital hair present.    Pulmonary/Chest: No respiratory distress.   Musculoskeletal: He exhibits no deformity.        Right ankle: No tenderness. No lateral malleolus, no medial malleolus, no AITFL, no CF ligament and no posterior TFL tenderness found. Achilles tendon exhibits no pain, no defect and normal Johnson's test results.        Left ankle: No tenderness. No lateral malleolus, no medial malleolus, no AITFL, no CF ligament and no posterior TFL tenderness found. Achilles tendon exhibits no pain, no defect and normal Johnson's test results.        Right foot: There is no tenderness and no bony tenderness.        Left foot: There is no tenderness and no bony tenderness.   Adequate joint range of motion without pain, limitation, nor crepitation Bilateral feet and ankle joints. Muscle strength is 5/5 in all groups bilaterally.           Feet:   Right Foot:   Protective Sensation: 5 sites tested. 5 sites sensed.   Left Foot:   Protective Sensation: 5 sites tested. 5 sites sensed.   Lymphadenopathy:   No lymphatic streaking     Neurological: He displays no atrophy. No sensory  deficit.   Light touch present     Skin: Skin is warm, dry and intact. No rash noted. He is not diaphoretic. No cyanosis. No pallor. Nails show no clubbing.   Skin is of normal turgor.   Normal temperature gradient.  Examination of the skin reveals no evidence of significant rashes, open lesions, suspicious appearing nevi or other concerning lesions.          Toenails 1-5 bilaterally are neatly trimmed; of normal color and thickness         Psychiatric: His mood appears not anxious. His affect is not inappropriate. His speech is not slurred. He is not combative. He is communicative. He is attentive.   Nursing note and vitals reviewed.            Assessment:       Encounter Diagnosis   Name Primary?    Comprehensive diabetic foot examination, type 2 DM, encounter for Yes         Plan:       Caesar was seen today for pcp, diabetic foot exam, nail care, callouses and bunions.    Diagnoses and all orders for this visit:    Comprehensive diabetic foot examination, type 2 DM, encounter for      I counseled the patient on his conditions, their implications and medical management.      - Shoe inspection. Diabetic Foot Education. Patient reminded of the importance of good nutrition and blood sugar control to help prevent podiatric complications of diabetes. Patient instructed on proper foot hygeine. We discussed wearing proper shoe gear, daily foot inspections, never walking without protective shoe gear, caution putting sharp instruments to feet     - Discussed DM foot care:  Wear comfortable, proper fitting shoes. Wash feet daily. Dry well. After drying, apply moisturizer to feet (no lotion to webspaces). Inspect feet daily for skin breaks, blisters, swelling, or redness. Wear cotton socks (preferably white)  Change socks every day. Do NOT walk barefoot. Do NOT use heating pads or warm/hot water soaks     - Discussed importance of daily moisturizer to the feet such as Gold bonds diabetic foot cream    - Patient is low risk  for developing lower extremity issues secondary to diabetes    - RTC in  1 year for a diabetic foot exam  or sooner if problems    .

## 2018-06-13 ENCOUNTER — PATIENT OUTREACH (OUTPATIENT)
Dept: ADMINISTRATIVE | Facility: HOSPITAL | Age: 68
End: 2018-06-13

## 2018-06-13 NOTE — PROGRESS NOTES
[facility] is committed to your overall health.  To help you get the most out of each of your visits, we will review your information to make sure you are up to date on all of your recommended tests and/or procedures.       Your PCP  Sophie Yan MD   found that you may be due for:       Health Maintenance Due   Topic Date Due    Foot Exam  06/22/2018             If you have had any of the above done at another facility, please bring the records or information with you so that your record at [facility] will be complete.  If you would like to schedule any of these, please contact me.     If you are currently taking medication, please bring it with you to your appointment for review.     Also, if you have any type of Advanced Directives, please bring them with you to your office visit so we may scan them into your chart.       Thank you for Choosing [facility] for your healthcare needs.        Additional Information  If you have questions, you can email [email or web address] or call [phone]  to talk to our [facility] staff. Remember, [facility] is NOT to be used for urgent needs. For medical emergencies, dial 911.

## 2018-06-14 ENCOUNTER — OFFICE VISIT (OUTPATIENT)
Dept: INTERNAL MEDICINE | Facility: CLINIC | Age: 68
End: 2018-06-14
Payer: MEDICARE

## 2018-06-14 VITALS
HEART RATE: 80 BPM | HEIGHT: 71 IN | SYSTOLIC BLOOD PRESSURE: 128 MMHG | OXYGEN SATURATION: 94 % | DIASTOLIC BLOOD PRESSURE: 82 MMHG | WEIGHT: 244.06 LBS | BODY MASS INDEX: 34.17 KG/M2

## 2018-06-14 DIAGNOSIS — I10 ESSENTIAL HYPERTENSION: ICD-10-CM

## 2018-06-14 DIAGNOSIS — E11.40 TYPE 2 DIABETES MELLITUS WITH DIABETIC NEUROPATHY, WITHOUT LONG-TERM CURRENT USE OF INSULIN: Primary | ICD-10-CM

## 2018-06-14 DIAGNOSIS — M25.521 RIGHT ELBOW PAIN: ICD-10-CM

## 2018-06-14 PROCEDURE — 99999 PR PBB SHADOW E&M-EST. PATIENT-LVL IV: CPT | Mod: PBBFAC,,, | Performed by: FAMILY MEDICINE

## 2018-06-14 PROCEDURE — 99214 OFFICE O/P EST MOD 30 MIN: CPT | Mod: S$PBB,,, | Performed by: FAMILY MEDICINE

## 2018-06-14 PROCEDURE — 99214 OFFICE O/P EST MOD 30 MIN: CPT | Mod: PBBFAC,PN | Performed by: FAMILY MEDICINE

## 2018-06-14 NOTE — PROGRESS NOTES
"Subjective:       Patient ID: Caesar Arevalo is a 67 y.o. male.    Chief Complaint: Diabetes; Tremors; and Arm Swelling    HPI 66 y/o former smoker with DM2, HTN, BPH, Vit D def, CLBP, colon polyp (tubular adenoma) is here for follow up HTN, his blood pressure is almost at goal, home cuff checked to day and not accurate, he did take his medications today. He is not very active, bowling. Denies f/body aches/n/v/d/constipation/sob/cp, has urinary frequency, denies dysuria/urgency.  Sleeping ok, does not want to be re-evaluated in sleep clinic. Appetite ok.  R arm pain for a month, comes and goes, started during bowling, pain is over R elbow, cannot describe pain, 5/10, not sure the trigger, soft sleeve helps some, ice helps some  DM2: a1c 8.4 this morning blood sugar was 137,  he is checking 3 times a day, low 120 high 160, on statin, ace, trajenta, eye exam utd and foot exam utd, discussed that he is not at goal  BPH: Hx of prostate procedure, followed here     Review of Systems   Constitutional: Negative for activity change, appetite change, fatigue and fever.   Respiratory: Negative for cough and shortness of breath.    Cardiovascular: Negative for chest pain, palpitations and leg swelling.   Gastrointestinal: Negative for constipation, diarrhea, nausea and vomiting.   Genitourinary: Negative for difficulty urinating and dysuria.   Musculoskeletal: Positive for arthralgias. Negative for joint swelling.        R elbow   Skin: Negative for rash.   Psychiatric/Behavioral: Negative for sleep disturbance.       Objective:      /82   Pulse 80   Ht 5' 11" (1.803 m)   Wt 110.7 kg (244 lb 0.8 oz)   SpO2 (!) 94%   BMI 34.04 kg/m²   Physical Exam   Constitutional: He appears well-developed and well-nourished.   HENT:   Head: Normocephalic and atraumatic.   Mouth/Throat: No oropharyngeal exudate.   Neck: Normal range of motion. Neck supple. No thyromegaly present.   Cardiovascular: Normal rate, regular rhythm and " normal heart sounds.    Pulmonary/Chest: Effort normal and breath sounds normal. No respiratory distress.   Musculoskeletal: Normal range of motion. He exhibits tenderness (R lateral elbow). He exhibits no edema.   Strength 5/5 bilateral ue   Lymphadenopathy:     He has no cervical adenopathy.   Neurological: He is alert.   Skin: Skin is warm and dry.   Psychiatric: He has a normal mood and affect.   Nursing note and vitals reviewed.      Assessment:       1. Type 2 diabetes mellitus with diabetic neuropathy, without long-term current use of insulin    2. Right elbow pain    3. Essential hypertension        Plan:   Caesar was seen today for diabetes, tremors and arm swelling.    Diagnoses and all orders for this visit:    Type 2 diabetes mellitus with diabetic neuropathy, without long-term current use of insulin    Right elbow pain  -     X-Ray Elbow 2 Views Right; Future    Essential hypertension    PT if x-ray is ok

## 2018-06-15 ENCOUNTER — HOSPITAL ENCOUNTER (OUTPATIENT)
Dept: RADIOLOGY | Facility: HOSPITAL | Age: 68
Discharge: HOME | End: 2018-06-15
Attending: FAMILY MEDICINE
Payer: MEDICARE

## 2018-06-15 DIAGNOSIS — M25.521 RIGHT ELBOW PAIN: ICD-10-CM

## 2018-06-15 PROCEDURE — 73070 X-RAY EXAM OF ELBOW: CPT | Mod: 26,RT,, | Performed by: RADIOLOGY

## 2018-06-15 PROCEDURE — 73070 X-RAY EXAM OF ELBOW: CPT | Mod: TC,RT

## 2018-06-19 ENCOUNTER — TELEPHONE (OUTPATIENT)
Dept: INTERNAL MEDICINE | Facility: CLINIC | Age: 68
End: 2018-06-19

## 2018-06-19 DIAGNOSIS — M19.029 ELBOW ARTHRITIS: Primary | ICD-10-CM

## 2018-06-26 ENCOUNTER — CLINICAL SUPPORT (OUTPATIENT)
Dept: REHABILITATION | Facility: HOSPITAL | Age: 68
End: 2018-06-26
Attending: FAMILY MEDICINE
Payer: MEDICARE

## 2018-06-26 DIAGNOSIS — M19.029 ELBOW ARTHRITIS: ICD-10-CM

## 2018-06-26 DIAGNOSIS — M25.521 RIGHT ELBOW PAIN: ICD-10-CM

## 2018-06-26 PROCEDURE — 97110 THERAPEUTIC EXERCISES: CPT

## 2018-06-26 PROCEDURE — G8988 SELF CARE GOAL STATUS: HCPCS | Mod: CJ

## 2018-06-26 PROCEDURE — G8987 SELF CARE CURRENT STATUS: HCPCS | Mod: CK

## 2018-06-26 PROCEDURE — 97165 OT EVAL LOW COMPLEX 30 MIN: CPT

## 2018-06-26 NOTE — PLAN OF CARE
[facility] Occupational Therapy  Initial Evaluation     Name: Caesar Arevalo  Clinic Number: [id number]    Therapy Diagnosis: No diagnosis found.  Physician: Sophie Yan MD    Physician Orders: eval and treat   Medical Diagnosis: M19.029 Elbow Arthritis   Surgical Procedure/ Date :none   Evaluation Date: 6/26/2018  Insurance:medicare and [employer]   Insurance Authorization period Expiration: 12/31/18   Plan of Care Certification Period: 8/31/18    Visit # / Visits Authortized: 1 / 20  Time In:3:00  Time Out: 4:00  Total Billable Time:  60  minutes    Precautions: Standard    Subjective     Involved Side:  right  Dominant Side: Right  Date of Onset: several months ago  Mechanism of Injury:  Pain started in elbow then he bowled 3 games in a row one night then  The pain was more intense .   History of Current Condition: pain started and has increased to effect lifting , grasping  And bowling     Imaging: xray   Previous Therapy: none     Patient's Goals for Therapy: full use of right arm no pain  When bowling     Pain:  Functional Pain Scale Rating 0-10:   4/10 on average  4/10 at best  4/10 at worst  Locationright:elbow lateral   Description: Dull  Aggravating Factors: Extending wrist   Easing Factors: ice    Occupation:   Retired   Working presently: retired   Duties: very active with [employer]     Functional Limitations/Social History:    Previous functional status includes: Independent with all ADLs.     Current FunctionalStatus   Home/Living environment : lives with their spouse      Limitation of Functional Status as follows:   ADLs/IADLs:     - Feeding:Not Necessary    - Bathing:Not Necessary    - Dressing/Grooming: Not Necessary    - Driving: Not Necessary     Leisure: bowling       Past Medical History/Physical Systems Review:   Caesar Arevalo  has a past medical history of Bell's palsy; Diabetes mellitus, type 2; Hypertension; and TB lung, latent.    Caesar Arevalo  has a past  surgical history that includes plantar warts; Knee ligament reconstruction; Prostate surgery; Hernia repair (04/2016); and Colonoscopy (N/A, 7/25/2016).    Caesar has a current medication list which includes the following prescription(s): amlodipine, aspirin, atorvastatin, blood sugar diagnostic, blood-glucose meter, cholecalciferol (vitamin d3), lancets, linagliptin, lisinopril, loratadine, metformin, multivit-min-folic-vit k-lycop, naftifine, omega-3 fatty acids, and tamsulosin.    Review of patient's allergies indicates:  No Known Allergies       Objective     Observation/Appearance:  Skin intact     A ROM. Measured in degrees.   6/26/2018 6/26/2018     Left Right          Elbow flex 135 140              Ext   0 0           Wristext/ flex  40/40 40/40             pro 90 90              Sup   90 90              RD   15 15             UD 20 20      Sensation  Median Nerve Distribution 6/26/2018 6/26/2018    Left Right   [place] Fly     Normal 1.65-2.83 X X     Special Tests:  Right pos resisted wrist ext   Right     Strength (Dyanmometer) and Pinch Strength (Pinch Gauge)  Measured in pounds and psi. Average of three trials.   6/26/2018 6/26/2018    Left Right   Rung II 32 72   Key Pinch 9 7   3pt Pinch 9 5   2pt Pinch 8 4       CMS Impairment/Limitation/Restriction for FOTO initial  Survey    Therapist reviewed FOTO scores for Caesar Arevalo on 6/26/2018.   FOTO documents entered into [employer] - see Media section.                    6/26/2018   Limitation Score: 42%  Category: Self care         Current : CK = at least 40% but < 60% impaired, limited or restricted  Goal: CJ = at least 20% but < 40% impaired, limited or restricted  Discharge:          Treatment     Treatment Time In: 3;00  Treatment Time Out: 4:00  Total Treatment time separate from Evaluation time: 15    Caesar received the following manual therapy techniques for 12 minutes:   -STM over lateral epicondyle     Caesar received therapeutic  exercises for 10 minutes including:  -wrist flexion stretches     Home Exercise Program/Education:  Issued HEP: wrist flexion , stretches, , use of ice, limiting bowling to 1 time a week.       and educated on modality use for pain management . Exercises were reviewed and Caesar was able to demonstrate them prior to the end of the session.   Pt received a written copy of exercises to perform at home. Caesar demonstrated good  understanding of the education provided.  Pt was advised to perform these exercises free of pain, and to stop performing them if pain occurs.    Patient/Family Education: role of OT, goals for OT, scheduling/cancellations - pt verbalized understanding. Discussed insurance limitations with patient.        Assessment     Caesar Arevalo is a 67 y.o. male referred to outpatient occupational/hand therapy and presents with a medical diagnosis of right lateral epicondylitis, resulting in weak , decreased Range of motion , and impaired function with bowling task and demonstrates limitations as described in the chart below. Following  medical record review it is determined that pt will benefit from occupational therapy services in order to maximize pain free and/or functional use of right arm .     The patient's rehab potential is Good.     Anticipated barriers to occupational therapy: none   Pt has no cultural, educational or language barriers to learning provided.    Profile and History Assessment of Occupational Performance Level of Clinical Decision Making Complexity Score   Occupational Profile:   Caesar Arevalo is a 67 y.o. male who lives with their family and is currently retired  as retired . Caesar Arevalo has difficulty with  Dressing, fine motor to manage clothing     affecting his/her daily functional abilities. His/her main goal for therapy is good.     Comorbidities:    has a past medical history of Bell's palsy; Diabetes mellitus, type 2; Hypertension; and TB lung,  latent.    Medical and Therapy History Review:   Brief               Performance Deficits    Physical:  Joint Mobility  Muscle Power/Strength  Muscle Endurance   Strength  Pinch Strength    Cognitive:  No Deficits    Psychosocial:    No Deficits     Clinical Decision Making:  low    Assessment Process:  Problem-Focused Assessments    Modification/Need for Assistance:  Not Necessary    Intervention Selection:  Limited Treatment Options       low  Based on PMHX, co morbidities , data from assessments and functional level of assistance required with task and clinical presentation directly impacting function.       The following goals were discussed with the patient and patient is in agreement with them as to be addressed in the treatment plan.          GOALS: 8 weeks. Pt agrees with goals set.  Goals:     Short Term (6 weeks on 7/31/18):  1)   Patient to be IND with HEP and modalities for pain management  2)   Increase ROM 10 degrees in wrist ext  motion to increase functional hand use for right /lift    3)   Increase  strength by  20 lbs. to grasp right hand   4)   Increase pinch 1-3 psis for  10    Long Term (by discharge):  1)   Pt will report 2/10 out of 10 pain with right   2)   Patient to score of CJ on FOTO to demonstrate improved perception of functionalright hand/ arm  use.  3)   Pt will return to prior level of function for ADLs and household management             Plan   Certification Period/Plan of care expiration: 6/26/2018 to 8/31/18.    Outpatient Occupational Therapy 1 times weekly for 6 weeks may include the following interventions: US 3 mhz, Therapeutic exercises/activities., Iontophoresis with 2.0 cc Dexamethasone and Strengthening.      Leidy Santos, OT

## 2018-06-28 DIAGNOSIS — I10 ESSENTIAL HYPERTENSION: ICD-10-CM

## 2018-06-28 RX ORDER — AMLODIPINE BESYLATE 10 MG/1
TABLET ORAL
Qty: 90 TABLET | Refills: 1 | Status: SHIPPED | OUTPATIENT
Start: 2018-06-28 | End: 2019-03-25 | Stop reason: SDUPTHER

## 2018-07-03 ENCOUNTER — CLINICAL SUPPORT (OUTPATIENT)
Dept: REHABILITATION | Facility: HOSPITAL | Age: 68
End: 2018-07-03
Attending: FAMILY MEDICINE
Payer: MEDICARE

## 2018-07-03 DIAGNOSIS — M19.029 ELBOW ARTHRITIS: Primary | ICD-10-CM

## 2018-07-03 PROCEDURE — 97110 THERAPEUTIC EXERCISES: CPT

## 2018-07-10 ENCOUNTER — CLINICAL SUPPORT (OUTPATIENT)
Dept: REHABILITATION | Facility: HOSPITAL | Age: 68
End: 2018-07-10
Attending: FAMILY MEDICINE
Payer: MEDICARE

## 2018-07-10 DIAGNOSIS — M25.521 RIGHT ELBOW PAIN: Primary | ICD-10-CM

## 2018-07-10 PROCEDURE — 97110 THERAPEUTIC EXERCISES: CPT

## 2018-07-10 NOTE — PROGRESS NOTES
Occupational therapy daily note       Name: Caesar Arevalo  Clinic Number: [id number]     Therapy Diagnosis: No diagnosis found.  Physician: Sophie Yan MD     Physician Orders: eval and treat   Medical Diagnosis: M19.029 Elbow Arthritis   Surgical Procedure/ Date :none   Evaluation Date: 6/26/2018  Insurance:medicare and [employer]   Insurance Authorization period Expiration: 12/31/18   Plan of Care Certification Period: 8/31/18     Visit # / Visits Authortized: 3/ 20    Total Billable Time:  60  minutes     Precautions: Standard     Subjective      Involved Side:  right  Dominant Side: Right  Date of Onset: several months ago  Mechanism of Injury:  Pain started in elbow then he bowled 3 games in a row one night then  The pain was more intense .   History of Current Condition: pain started and has increased to effect lifting , grasping  And bowling      Imaging: xray   Previous Therapy: none      Patient's Goals for Therapy: full use of right arm no pain  When bowling      Pain:  Functional Pain Scale Rating 0-10:   4/10 on average  4/10 at best  4/10 at worst  Locationright:elbow lateral   Description: Dull  Aggravating Factors: Extending wrist   Easing Factors: ice     Occupation:   Retired   Working presently: retired   Duties: very active with [employer]                Leisure: bowling , he is still bowling one time a year         Past Medical History/Physical Systems Review:   Caesar Arevalo  has a past medical history of Bell's palsy; Diabetes mellitus, type 2; Hypertension; and TB lung, latent.     Caesar Arevalo  has a past surgical history that includes plantar warts; Knee ligament reconstruction; Prostate surgery; Hernia repair (04/2016); and Colonoscopy (N/A, 7/25/2016).     Caesar has a current medication list which includes the following prescription(s): amlodipine, aspirin, atorvastatin, blood sugar diagnostic, blood-glucose meter, cholecalciferol (vitamin d3), lancets, linagliptin,  lisinopril, loratadine, metformin, multivit-min-folic-vit k-lycop, naftifine, omega-3 fatty acids, and tamsulosin.     Review of patient's allergies indicates:  No Known Allergies         Objective      Observation/Appearance:  Skin intact      A ROM. Measured in degrees.    6/26/2018 6/26/2018       Left Right               Elbow flex 135 140               Ext    0 0            Wristext/ flex  40/40 40/40              pro 90 90               Sup    90 90               RD    15 15              UD 20 20        Sensation  Median Nerve Distribution 6/26/2018 6/26/2018     Left Right   [place] Fly       Normal 1.65-2.83 X X      Special Tests:  Right pos resisted wrist ext   Right      Strength (Dyanmometer) and Pinch Strength (Pinch Gauge)  Measured in pounds and psi. Average of three trials.    6/26/2018 6/26/2018     Left Right   Rung II 32 72   Key Pinch 9 7   3pt Pinch 9 5   2pt Pinch 8 4         CMS Impairment/Limitation/Restriction for FOTO initial  Survey     Therapist reviewed FOTO scores for Caesar Arevalo on 6/26/2018.   FOTO documents entered into [employer] - see Media section.                    6/26/2018   Limitation Score: 42%  Category: Self care            Current : CK = at least 40% but < 60% impaired, limited or restricted  Goal: CJ = at least 20% but < 40% impaired, limited or restricted  Discharge:             Treatment      Treatment Time In: 2:00  Treatment Time Out: 3:00  Total Treatment time separate from Evaluation time: 15     Caesar received the following manual therapy techniques for 12 minutes:   -STM over lateral epicondyle     moist  Heat to right elbow X 10 minutes   Caesar received therapeutic exercises for 10 minutes including:    -wrist flexion stretches , yellow flex bar eccentric wrist extension , weighted hammer 1# pro. Supination., hand gripper 35# X 3 minutes. , weight well 1# X 2 minutes       Home Exercise Program/Education:  Issued HEP: wrist flexion , stretches, , use of  ice, limiting bowling to 1 time a week.        and educated on modality use for pain management . Exercises were reviewed and Caesar was able to demonstrate them prior to the end of the session.   Pt received a written copy of exercises to perform at home. Caesar demonstrated good  understanding of the education provided.  Pt was advised to perform these exercises free of pain, and to stop performing them if pain occurs.     Patient/Family Education: role of OT, goals for OT, scheduling/cancellations - pt verbalized understanding. Discussed insurance limitations with patient.           Assessment      Caesar Arevalo is a 67 y.o. male referred to outpatient occupational/hand therapy and presents with a medical diagnosis of right lateral epicondylitis, resulting in weak , decreased Range of motion , and impaired function with bowling task and demonstrates limitations as described in the chart below. Following  medical record review it is determined that pt will benefit from occupational therapy services in order to maximize pain free and/or functional use of right arm .      The following goals were discussed with the patient and patient is in agreement with them as to be addressed in the treatment plan.            GOALS: 8 weeks. Pt agrees with goals set.  Goals:      Short Term (6 weeks on 7/31/18):  1)   Patient to be IND with HEP and modalities for pain management  2)   Increase ROM 10 degrees in wrist ext  motion to increase functional hand use for right /lift    3)   Increase  strength by  20 lbs. to grasp right hand   4)   Increase pinch 1-3 psis for  10     Long Term (by discharge):  1)   Pt will report 2/10 out of 10 pain with right   2)   Patient to score of CJ on FOTO to demonstrate improved perception of functionalright hand/ arm  use.  3)   Pt will return to prior level of function for ADLs and household management                 Plan   Certification Period/Plan of care expiration:  6/26/2018 to 8/31/18.     Outpatient Occupational Therapy 1 times weekly for 6 weeks may include the following interventions: US 3 mhz, Therapeutic exercises/activities., Iontophoresis with 2.0 cc Dexamethasone and Strengthening.        Leidy Santos, OT

## 2018-07-15 DIAGNOSIS — E11.40 TYPE 2 DIABETES MELLITUS WITH DIABETIC NEUROPATHY, WITHOUT LONG-TERM CURRENT USE OF INSULIN: ICD-10-CM

## 2018-07-15 DIAGNOSIS — E78.5 HYPERLIPIDEMIA, UNSPECIFIED HYPERLIPIDEMIA TYPE: ICD-10-CM

## 2018-07-15 RX ORDER — ATORVASTATIN CALCIUM 20 MG/1
TABLET, FILM COATED ORAL
Qty: 90 TABLET | Refills: 1 | Status: SHIPPED | OUTPATIENT
Start: 2018-07-15 | End: 2019-04-09 | Stop reason: SDUPTHER

## 2018-07-16 ENCOUNTER — OFFICE VISIT (OUTPATIENT)
Dept: UROLOGY | Facility: CLINIC | Age: 68
End: 2018-07-16
Payer: MEDICARE

## 2018-07-16 ENCOUNTER — PATIENT MESSAGE (OUTPATIENT)
Dept: UROLOGY | Facility: CLINIC | Age: 68
End: 2018-07-16

## 2018-07-16 VITALS
HEART RATE: 89 BPM | SYSTOLIC BLOOD PRESSURE: 142 MMHG | HEIGHT: 71 IN | WEIGHT: 238 LBS | BODY MASS INDEX: 33.32 KG/M2 | DIASTOLIC BLOOD PRESSURE: 88 MMHG

## 2018-07-16 DIAGNOSIS — N13.8 BPH WITH OBSTRUCTION/LOWER URINARY TRACT SYMPTOMS: Primary | ICD-10-CM

## 2018-07-16 DIAGNOSIS — N40.1 BPH WITH OBSTRUCTION/LOWER URINARY TRACT SYMPTOMS: Primary | ICD-10-CM

## 2018-07-16 LAB
BILIRUB SERPL-MCNC: NORMAL MG/DL
BLOOD URINE, POC: NORMAL
COLOR, POC UA: NORMAL
GLUCOSE UR QL STRIP: NORMAL
KETONES UR QL STRIP: NORMAL
LEUKOCYTE ESTERASE URINE, POC: NORMAL
NITRITE, POC UA: NORMAL
PH, POC UA: 5
POC RESIDUAL URINE VOLUME: 70 ML (ref 0–100)
PROTEIN, POC: NORMAL
SPECIFIC GRAVITY, POC UA: 1.01
UROBILINOGEN, POC UA: NORMAL

## 2018-07-16 PROCEDURE — 99999 PR PBB SHADOW E&M-EST. PATIENT-LVL III: CPT | Mod: PBBFAC,,, | Performed by: NURSE PRACTITIONER

## 2018-07-16 PROCEDURE — 51798 US URINE CAPACITY MEASURE: CPT | Mod: PBBFAC | Performed by: NURSE PRACTITIONER

## 2018-07-16 PROCEDURE — 81001 URINALYSIS AUTO W/SCOPE: CPT | Mod: PBBFAC | Performed by: NURSE PRACTITIONER

## 2018-07-16 PROCEDURE — 99213 OFFICE O/P EST LOW 20 MIN: CPT | Mod: PBBFAC,25 | Performed by: NURSE PRACTITIONER

## 2018-07-16 PROCEDURE — 99214 OFFICE O/P EST MOD 30 MIN: CPT | Mod: S$PBB,,, | Performed by: NURSE PRACTITIONER

## 2018-07-16 PROCEDURE — 81002 URINALYSIS NONAUTO W/O SCOPE: CPT | Mod: PBBFAC,59 | Performed by: NURSE PRACTITIONER

## 2018-07-16 RX ORDER — TAMSULOSIN HYDROCHLORIDE 0.4 MG/1
0.4 CAPSULE ORAL NIGHTLY
Qty: 90 CAPSULE | Refills: 2 | Status: SHIPPED | OUTPATIENT
Start: 2018-07-16 | End: 2018-07-16 | Stop reason: SDUPTHER

## 2018-07-16 RX ORDER — TAMSULOSIN HYDROCHLORIDE 0.4 MG/1
0.4 CAPSULE ORAL NIGHTLY
Qty: 90 CAPSULE | Refills: 2 | Status: SHIPPED | OUTPATIENT
Start: 2018-07-16 | End: 2018-10-16

## 2018-07-16 NOTE — PROGRESS NOTES
Subjective:       Patient ID: Caesar Arevalo is a 67 y.o. male.    Chief Complaint: Benign Prostatic Hypertrophy (urinary frequency around the clock...states some improvement with medication)      HPI: Caesar Arevalo is a 67 y.o. Black or  male who presents today f/u urinary frequency and medication refill. His last clinic visit was 5/21/18.     He was seen by Dr. He at [facility]. Pt reports he had some type of prostate surgery by him but is unsure of when it was. It improved his urinary symptoms but now they are worsening again.   Last PSA 10/3/17 0.6, records in media.    Today he presents to clinic for f/u urinary frequency. He reports some improvement in LUTS since starting on flomax. He reports daytime frequency every 3 hours or so and nocturia x 2-3. He reports his stream has improved in strength and continues to have intermittent stream. Denies straining to start flow. He denies urgency, incontinence, dysuria, hematuria or flank pain. He denies f/c/n/v. He drinks coffee and water mostly and on occasion drinks diet soda. He has not modified his diet and drinks liquids until he goes to sleep at night. He is type 2 diabetic with A1C 8.6. He reports his cousin had prostate cancer but denies any personal history of prostate cancer.     Review of patient's allergies indicates:  No Known Allergies    Current Outpatient Prescriptions   Medication Sig Dispense Refill    amLODIPine (NORVASC) 10 MG tablet TAKE 1 TABLET EVERY MORNING 90 tablet 1    aspirin (ECOTRIN) 81 MG EC tablet Take 81 mg by mouth once daily.      atorvastatin (LIPITOR) 20 MG tablet TAKE 1 TABLET EVERY MORNING 90 tablet 1    blood sugar diagnostic Strp 1 strip by Misc.(Non-Drug; Combo Route) route 3 (three) times daily. freestyle 300 strip 6    blood-glucose meter (FREESTYLE SYSTEM KIT) kit Use as instructed 1 each 0    cholecalciferol, vitamin D3, (VITAMIN D3) 1,000 unit capsule Take 1,000 Units by mouth once daily.       lancets (FREESTYLE LANCETS) 28 gauge Misc 1 lancet by Other route 3 (three) times daily. 100 each 5    linagliptin (TRADJENTA) 5 mg Tab tablet Take 1 tablet (5 mg total) by mouth once daily. 90 tablet 1    lisinopril (PRINIVIL,ZESTRIL) 30 MG tablet Take 1 tablet (30 mg total) by mouth once daily. 90 tablet 1    loratadine (CLARITIN) 10 mg tablet TAKE 1 TABLET DAILY 90 tablet 1    metFORMIN (GLUCOPHAGE) 1000 MG tablet TAKE 1 TABLET TWICE A DAY WITH MEALS 180 tablet 1    multivit-min-folic-vit K-lycop (ONE-A-DAY MEN'S 50 PLUS) 400- mcg Tab Take by mouth.      naftifine 2 % Gel Apply 1 application topically once daily. 60 g 2    omega-3 fatty acids (FISH OIL) 300 mg Cap Take 300 mg by mouth.      tamsulosin (FLOMAX) 0.4 mg Cap Take 1 capsule (0.4 mg total) by mouth every evening. 90 capsule 2     No current facility-administered medications for this visit.        Past Medical History:   Diagnosis Date    Bell's palsy     1980's    BPH (benign prostatic hyperplasia)     Diabetes mellitus, type 2     Hypertension     TB lung, latent 1980    s/p treatment       Past Surgical History:   Procedure Laterality Date    COLONOSCOPY N/A 7/25/2016    Procedure: COLONOSCOPY;  Surgeon: RICARDO Mclain MD;  Location: [facility] ([address] [place] [address]);  Service: Endoscopy;  Laterality: N/A;    HERNIA REPAIR  04/2016    norris    KNEE LIGAMENT RECONSTRUCTION      plantar warts      PROSTATE SURGERY         Family History   Problem Relation Age of Onset    Diabetes Mother     Hypertension Mother     Heart disease Mother     Diabetes Sister     Cancer Neg Hx     Stroke Neg Hx        Review of Systems   Constitutional: Negative for chills, diaphoresis, fatigue and fever.   HENT: Negative for congestion and trouble swallowing.    Eyes: Negative for visual disturbance.   Respiratory: Negative for chest tightness and shortness of breath.    Cardiovascular: Negative for chest pain and palpitations.    Gastrointestinal: Negative for abdominal pain, constipation, diarrhea, nausea and vomiting.   Genitourinary: Positive for frequency. Negative for decreased urine volume, difficulty urinating, discharge, dysuria, flank pain, hematuria, penile pain, penile swelling, scrotal swelling, testicular pain and urgency.   Musculoskeletal: Negative for back pain and gait problem.   Skin: Negative for pallor and rash.   Allergic/Immunologic: Negative for immunocompromised state.   Neurological: Negative for dizziness, seizures, syncope, weakness, light-headedness and headaches.   Hematological: Negative for adenopathy.   Psychiatric/Behavioral: Negative for confusion. The patient is not nervous/anxious.          All other systems were reviewed and were negative.    Objective:     Vitals:    07/16/18 1046   BP: (!) 142/88   Pulse: 89        Physical Exam   Nursing note and vitals reviewed.  Constitutional: He is oriented to person, place, and time. He appears well-developed and well-nourished.  Non-toxic appearance. He does not have a sickly appearance. He does not appear ill. No distress.   HENT:   Head: Normocephalic and atraumatic.   Eyes: Conjunctivae and EOM are normal.   Neck: Normal range of motion.   Cardiovascular: Normal rate and regular rhythm.    Pulmonary/Chest: Effort normal. No respiratory distress.   Abdominal: Soft. He exhibits no distension.   Musculoskeletal: Normal range of motion.   Neurological: He is alert and oriented to person, place, and time.   Skin: Skin is warm and dry.     Psychiatric: He has a normal mood and affect. His behavior is normal. Judgment and thought content normal.         Lab Results   Component Value Date    CREATININE 1.4 05/25/2018     Lab Results   Component Value Date    EGFRNONAA 52.4 (A) 05/25/2018     Lab Results   Component Value Date    ESTGFRAFRICA >60.0 05/25/2018     Urine dipstick in clinic: sp grav 1.015, pH 5, negative results    PVR: done in clinic with bladder  scanner by Nurse Mccartney was 70 ml.    Assessment:       1. BPH with obstruction/lower urinary tract symptoms        Plan:     Caesar was seen today for benign prostatic hypertrophy.    Diagnoses and all orders for this visit:    BPH with obstruction/lower urinary tract symptoms  -     Prostate Specific Antigen, Diagnostic; Future  -     POCT urinalysis, dipstick or tablet reag  -     POCT Bladder Scan  -     tamsulosin (FLOMAX) 0.4 mg Cap; Take 1 capsule (0.4 mg total) by mouth every evening.    Other orders  -     Discontinue: tamsulosin (FLOMAX) 0.4 mg Cap; Take 1 capsule (0.4 mg total) by mouth every evening.    -Discussed plan of care with patient  -Education and recommendations of today's plan of care including home remedies.  Discussed the in office findings; reassurance with no infection and emptying his bladder.  Discussed the contributing factors for his LUTS.  Diet modifications: reducing PM liquids (no eating or drinking 2 hours before bed), limit salt intake, no caffeine intake after 3 pm  Avoid Bladder Irritants: Tea, coffee, caffeine, alcohol, artificial sweeteners, citrus, spicy foods, acidic foods,chocolate, tomato-based foods, smoking  Elevating his feet couple of hours prior to bedtime. Void before bed.  -Refilled flomax.  -Discussed uncontrolled diabetes and affects on bladder. Pt states he is working on improving A1C.  -Discussed OAB medication. He reports he would like to try diet and behavioral modifications and follow up to see if any change since he had improvement with flomax itself.  -Follow up in 3 mths with PSA prior. PSA ordered and scheduled for 3 months.       I spent 25 minutes with the patient of which more than half was spent in coordinating the patient's care as well as in direct consultation with the patient in regards to our treatment and plan.

## 2018-07-16 NOTE — LETTER
July 16, 2018      Sophie Yan MD  [address] [place] [address]  [address]  [place] [address]           [facility] - Urology 4th Floor  [address]  [place] [address]  Phone: [phone]          Patient: Caesar Arevalo   MR Number: [id number]   YOB: 1950   Date of Visit: 7/16/2018       Dear Dr. Sophie Yan:    Thank you for referring Caesar Arevalo to me for evaluation. Attached you will find relevant portions of my assessment and plan of care.    If you have questions, please do not hesitate to call me. I look forward to following Caesar Arevalo along with you.    Sincerely,    Mary Onofre, NP    Enclosure  CC:  No Recipients    If you would like to receive this communication electronically, please contact [email or web address] or [phone] to request more information on [employer] access.    For providers and/or their staff who would like to refer a patient to [facility], please contact us through our one-stop-shop provider referral line, [facility], at [phone].    If you feel you have received this communication in error or would no longer like to receive these types of communications, please e-mail [email or web address]

## 2018-07-17 ENCOUNTER — CLINICAL SUPPORT (OUTPATIENT)
Dept: REHABILITATION | Facility: HOSPITAL | Age: 68
End: 2018-07-17
Attending: FAMILY MEDICINE
Payer: MEDICARE

## 2018-07-17 DIAGNOSIS — M25.521 RIGHT ELBOW PAIN: Primary | ICD-10-CM

## 2018-07-17 PROCEDURE — 97110 THERAPEUTIC EXERCISES: CPT

## 2018-07-19 NOTE — PROGRESS NOTES
Occupational therapy daily note       Name: Caesar Arevalo  Clinic Number: [id number]     Therapy Diagnosis: No diagnosis found.  Physician: Sophie Yan MD     Physician Orders: eval and treat   Medical Diagnosis: M19.029 Elbow Arthritis   Surgical Procedure/ Date :none   Evaluation Date: 6/26/2018  Insurance:medicare and [employer]   Insurance Authorization period Expiration: 12/31/18   Plan of Care Certification Period: 8/31/18     Visit # / Visits Authortized: 3/ 20    Total Billable Time:  60  minutes     Precautions: Standard     Subjective      Involved Side:  right  Dominant Side: Right  Date of Onset: several months ago  Mechanism of Injury:  Pain started in elbow then he bowled 3 games in a row one night then  The pain was more intense .   History of Current Condition: pain started and has increased to effect lifting , grasping  And bowling      Imaging: xray   Previous Therapy: none      Patient's Goals for Therapy: full use of right arm no pain  When bowling      Pain:  Functional Pain Scale Rating 0-10:   4/10 on average  4/10 at best  4/10 at worst  Locationright:elbow lateral   Description: Dull  Aggravating Factors: Extending wrist   Easing Factors: ice     Occupation:   Retired   Working presently: retired   Duties: very active with [employer]                Leisure: bowling , he is still bowling one time a year         Past Medical History/Physical Systems Review:   Caesar Arevalo  has a past medical history of Bell's palsy; Diabetes mellitus, type 2; Hypertension; and TB lung, latent.     Caesar Arevalo  has a past surgical history that includes plantar warts; Knee ligament reconstruction; Prostate surgery; Hernia repair (04/2016); and Colonoscopy (N/A, 7/25/2016).     Caesar has a current medication list which includes the following prescription(s): amlodipine, aspirin, atorvastatin, blood sugar diagnostic, blood-glucose meter, cholecalciferol (vitamin d3), lancets, linagliptin,  lisinopril, loratadine, metformin, multivit-min-folic-vit k-lycop, naftifine, omega-3 fatty acids, and tamsulosin.     Review of patient's allergies indicates:  No Known Allergies         Objective      Observation/Appearance:  Skin intact      A ROM. Measured in degrees.    6/26/2018 6/26/2018       Left Right               Elbow flex 135 140               Ext    0 0            Wristext/ flex  40/40 40/40              pro 90 90               Sup    90 90               RD    15 15              UD 20 20        Sensation  Median Nerve Distribution 6/26/2018 6/26/2018     Left Right   [place] Fly       Normal 1.65-2.83 X X      Special Tests:  Right pos resisted wrist ext   Right      Strength (Dyanmometer) and Pinch Strength (Pinch Gauge)  Measured in pounds and psi. Average of three trials.    6/26/2018 6/26/2018     Left Right   Rung II 32 72   Key Pinch 9 7   3pt Pinch 9 5   2pt Pinch 8 4         CMS Impairment/Limitation/Restriction for FOTO initial  Survey     Therapist reviewed FOTO scores for Caesar Arevalo on 6/26/2018.   FOTO documents entered into [employer] - see Media section.                    6/26/2018   Limitation Score: 42%  Category: Self care            Current : CK = at least 40% but < 60% impaired, limited or restricted  Goal: CJ = at least 20% but < 40% impaired, limited or restricted  Discharge:             Treatment      Treatment Time In: 2:30  Treatment Time Out: 3:30  Total Treatment time separate from Evaluation time: 15     Caesar received the following manual therapy techniques for 12 minutes:   -STM over lateral epicondyle     moist  Heat to right elbow X 10 minutes   Caeasr received therapeutic exercises for 10 minutes including:    -wrist flexion stretches , yellow flex bar eccentric wrist extension , weighted hammer 1# pro. Supination., hand gripper 35# X 3 minutes. , weight well 1# X 2 minutes       Home Exercise Program/Education:  Issued HEP: wrist flexion , stretches, , use of  ice, limiting bowling to 1 time a week.        and educated on modality use for pain management . Exercises were reviewed and Caesar was able to demonstrate them prior to the end of the session.   Pt received a written copy of exercises to perform at home. Caesar demonstrated good  understanding of the education provided.  Pt was advised to perform these exercises free of pain, and to stop performing them if pain occurs.     Patient/Family Education: role of OT, goals for OT, scheduling/cancellations - pt verbalized understanding. Discussed insurance limitations with patient.           Assessment      Caesar Arevalo is a 67 y.o. male referred to outpatient occupational/hand therapy and presents with a medical diagnosis of right lateral epicondylitis, resulting in weak , decreased Range of motion , Pt reports that pain is overall better and he has not bowled in  3 weeks and probably will not bowl next week.      The following goals were discussed with the patient and patient is in agreement with them as to be addressed in the treatment plan.            GOALS: 8 weeks. Pt agrees with goals set.  Goals:      Short Term (6 weeks on 7/31/18):  1)   Patient to be IND with HEP and modalities for pain management  2)   Increase ROM 10 degrees in wrist ext  motion to increase functional hand use for right /lift    3)   Increase  strength by  20 lbs. to grasp right hand   4)   Increase pinch 1-3 psis for  10     Long Term (by discharge):  1)   Pt will report 2/10 out of 10 pain with right   2)   Patient to score of CJ on FOTO to demonstrate improved perception of functionalright hand/ arm  use.  3)   Pt will return to prior level of function for ADLs and household management                 Plan   Certification Period/Plan of care expiration: 6/26/2018 to 8/31/18.     Outpatient Occupational Therapy 1 times weekly for 6 weeks may include the following interventions: US 3 mhz, Therapeutic exercises/activities.,  Iontophoresis with 2.0 cc Dexamethasone and Strengthening.        Leidy Santos, OT

## 2018-07-24 ENCOUNTER — CLINICAL SUPPORT (OUTPATIENT)
Dept: REHABILITATION | Facility: HOSPITAL | Age: 68
End: 2018-07-24
Attending: FAMILY MEDICINE
Payer: MEDICARE

## 2018-07-24 DIAGNOSIS — M25.521 RIGHT ELBOW PAIN: ICD-10-CM

## 2018-07-24 PROCEDURE — 97110 THERAPEUTIC EXERCISES: CPT

## 2018-07-24 PROCEDURE — G8987 SELF CARE CURRENT STATUS: HCPCS | Mod: CJ

## 2018-07-24 PROCEDURE — G8988 SELF CARE GOAL STATUS: HCPCS | Mod: CJ

## 2018-07-24 NOTE — PROGRESS NOTES
Occupational therapy daily note       Name: Caesar Arevalo  Clinic Number: [id number]     Therapy Diagnosis: No diagnosis found.  Physician: Sophie Yan MD     Physician Orders: eval and treat   Medical Diagnosis: M19.029 Elbow Arthritis   Surgical Procedure/ Date :none   Evaluation Date: 6/26/2018  Insurance:medicare and    Insurance Authorization period Expiration: 12/31/18   Plan of Care Certification Period: 8/31/18     Visit # / Visits Authortized: 5/ 20    Total Billable Time:  60  minutes     Precautions: Standard     Subjective       Pt reports that he has been avoiding most things and that his right elbow is feelin so much better .         Involved Side:  right  Dominant Side: Right  Date of Onset: several months ago  Mechanism of Injury:  Pain started in elbow then he bowled 3 games in a row one night then  The pain was more intense .   History of Current Condition: pain started and has increased to effect lifting , grasping  And bowling      Imaging: xray   Previous Therapy: none      Patient's Goals for Therapy: full use of right arm no pain  When bowling      Pain:  Functional Pain Scale Rating 0-10:   2/10 on average  2/10  at best   2/10  at worst  Locationright:elbow lateral   Description: Dull  Aggravating Factors: Extending wrist   Easing Factors: ice     Occupation:   Retired   Working presently: retired   Duties: very active with [employer]                Leisure: bowling , he is still bowling one time a year         Past Medical History/Physical Systems Review:   Caesar Arevalo  has a past medical history of Bell's palsy; Diabetes mellitus, type 2; Hypertension; and TB lung, latent.     Caesar Arevalo  has a past surgical history that includes plantar warts; Knee ligament reconstruction; Prostate surgery; Hernia repair (04/2016); and Colonoscopy (N/A, 7/25/2016).     Caesar has a current medication list which includes the following prescription(s): amlodipine, aspirin,  atorvastatin, blood sugar diagnostic, blood-glucose meter, cholecalciferol (vitamin d3), lancets, linagliptin, lisinopril, loratadine, metformin, multivit-min-folic-vit k-lycop, naftifine, omega-3 fatty acids, and tamsulosin.     Review of patient's allergies indicates:  No Known Allergies         Objective      Observation/Appearance:  Skin intact      A ROM. Measured in degrees.    6/26/2018 6/26/2018       Left Right               Elbow flex 135 140               Ext    0 0            Wristext/ flex  40/40 40/40              pro 90 90               Sup    90 90               RD    15 15              UD 20 20        Sensation  Median Nerve Distribution 6/26/2018 6/26/2018     Left Right   [place]       Normal 1.65-2.83 X X      Special Tests:  Right pos resisted wrist ext   Right      Strength (Dyanmometer) and Pinch Strength (Pinch Gauge)  Measured in pounds and psi. Average of three trials.    6/26/2018 6/26/2018     Left Right   Rung II 32 72   Key Pinch 9 7   3pt Pinch 9 5   2pt Pinch 8 4         CMS Impairment/Limitation/Restriction for FOTO initial  Survey     Therapist reviewed FOTO scores for Caesar Arevalo on 7/24/18    FOTO documents entered into [employer] - see Media section.                   Limitation Score: 31% , CJ   Category: Self care            Current : CK = at least 40% but < 60% impaired, limited or restricted  Goal: CJ = at least 20% but < 40% impaired, limited or restricted  Discharge:             Treatment      Treatment Time In: 2:30  Treatment Time Out: 3:30      Caesar received the following manual therapy techniques for 12 minutes:   -STM over lateral epicondyle     moist  Heat to right elbow X 10 minutes   Caesar received therapeutic exercises for 10 minutes including:    -wrist flexion stretches ,  Green  flex bar eccentric wrist extension, pro, sup  , weighted hammer 1# pro. Supination., hand gripper 35# X 3 minutes. , weight well 3# X 2 minutes       Home Exercise  Program/Education:  Issued HEP: wrist flexion , stretches, , use of ice, limiting bowling to 1 time a week.        and educated on modality use for pain management . Exercises were reviewed and Caesar was able to demonstrate them prior to the end of the session.   Pt received a written copy of exercises to perform at home. Caesar demonstrated good  understanding of the education provided.  Pt was advised to perform these exercises free of pain, and to stop performing them if pain occurs.     Patient/Family Education: role of OT, goals for OT, scheduling/cancellations - pt verbalized understanding. Discussed insurance limitations with patient.           Assessment      Caesar Arevalo is a 67 y.o. male referred to outpatient occupational/hand therapy and presents with a medical diagnosis of right lateral epicondylitis, resulting in weak , decreased Range of motion , Pt reports that pain is overall better and he will try to bowl tomorrow with the lightest bowling ball he has. Pt was able to tolerate increased resistance in therapy pain free.      The following goals were discussed with the patient and patient is in agreement with them as to be addressed in the treatment plan.            GOALS: 8 weeks. Pt agrees with goals set.  Goals:      Short Term (6 weeks on 7/31/18):  1)   Patient to be IND with HEP and modalities for pain management  2)   Increase ROM 10 degrees in wrist ext  motion to increase functional hand use for right /lift    3)   Increase  strength by  20 lbs. to grasp right hand   4)   Increase pinch 1-3 psis for  10     Long Term (by discharge):  1)   Pt will report 2/10 out of 10 pain with right   2)   Patient to score of CJ on FOTO to demonstrate improved perception of functionalright hand/ arm  use.  3)   Pt will return to prior level of function for ADLs and household management                 Plan   Certification Period/Plan of care expiration: 6/26/2018 to 8/31/18.   assess   and strength next visit .     Outpatient Occupational Therapy 1 times weekly for 6 weeks may include the following interventions: US 3 mhz, Therapeutic exercises/activities., Iontophoresis with 2.0 cc Dexamethasone and Strengthening.        Leidy Santos, OT

## 2018-07-31 ENCOUNTER — CLINICAL SUPPORT (OUTPATIENT)
Dept: REHABILITATION | Facility: HOSPITAL | Age: 68
End: 2018-07-31
Attending: FAMILY MEDICINE
Payer: MEDICARE

## 2018-07-31 DIAGNOSIS — M25.521 RIGHT ELBOW PAIN: Primary | ICD-10-CM

## 2018-07-31 PROCEDURE — 97110 THERAPEUTIC EXERCISES: CPT

## 2018-07-31 PROCEDURE — 97035 APP MDLTY 1+ULTRASOUND EA 15: CPT

## 2018-07-31 NOTE — PROGRESS NOTES
Occupational therapy daily note       Name: Caesar Arevalo  Clinic Number: [id number]     Therapy Diagnosis: No diagnosis found.  Physician: Sophie Yan MD     Physician Orders: eval and treat   Medical Diagnosis: M19.029 Elbow Arthritis   Surgical Procedure/ Date :none   Evaluation Date: 6/26/2018  Insurance:medicare and    Insurance Authorization period Expiration: 12/31/18   Plan of Care Certification Period: 8/31/18     Visit # / Visits Authortized:6/ 20    Total Billable Time:  60  minutes     Precautions: Standard     Subjective       Pt reports that he  Did bowl 3 games and it seemed to feel okay , then they needed a substitute so he played another round of 3 then his arm was sore . He did purchase a lighter bowling ball 13.2 # , it seems a little better than the 14.5# bowling ball.         Involved Side:  right  Dominant Side: Right  Date of Onset: several months ago  Mechanism of Injury:  Pain started in elbow then he bowled 3 games in a row one night then  The pain was more intense .   History of Current Condition: pain started and has increased to effect lifting , grasping  And bowling      Imaging: xray   Previous Therapy: none      Patient's Goals for Therapy: full use of right arm no pain  When bowling      Pain:  Functional Pain Scale Rating 0-10:   2/10 on average  2/10  at best, reports that the pain is intermittent    2/10  at worst  Locationright:elbow lateral   Description: Dull  Aggravating Factors: Extending wrist   Easing Factors: ice     Occupation:   Retired   Working presently: retired   Duties: very active with [employer]                Leisure: bowling , he is still bowling one time a year         Past Medical History/Physical Systems Review:   Caesar Arevalo  has a past medical history of Bell's palsy; Diabetes mellitus, type 2; Hypertension; and TB lung, latent.     Caesar Arevalo  has a past surgical history that includes plantar warts; Knee ligament reconstruction;  Prostate surgery; Hernia repair (04/2016); and Colonoscopy (N/A, 7/25/2016).     Caesar has a current medication list which includes the following prescription(s): amlodipine, aspirin, atorvastatin, blood sugar diagnostic, blood-glucose meter, cholecalciferol (vitamin d3), lancets, linagliptin, lisinopril, loratadine, metformin, multivit-min-folic-vit k-lycop, naftifine, omega-3 fatty acids, and tamsulosin.     Review of patient's allergies indicates:  No Known Allergies         Objective      Observation/Appearance:  Skin intact      A ROM. Measured in degrees.    6/26/2018 6/26/2018 7/31/18       Left Right  right               Elbow flex 135 140  140              Ext    0 0            Wristext/ flex  40/40 40/40 40/40/             pro 90 90 90              Sup    90 90 90              RD    15 15  15            UD 20 20 20           Strength (Dyanmometer) and Pinch Strength (Pinch Gauge)  Measured in pounds and psi. Average of three trials.    6/26/2018 6/26/2018 7/31/18     Left Right        right    Rung II 32 72           80   [place] Pinch 9 7              8   3pt Pinch 9 5               6   2pt Pinch 8 4                6         CMS Impairment/Limitation/Restriction for FOTO initial  Survey     Therapist reviewed FOTO scores for Caesar Arevalo on 7/24/18    FOTO documents entered into [employer] - see Media section.                   Limitation Score: 31% , CJ   Category: Self care            Current : CK = at least 40% but < 60% impaired, limited or restricted  Goal: CJ = at least 20% but < 40% impaired, limited or restricted  Discharge:             Treatment      Treatment Time In: 2:30  Treatment Time Out: 3:30      Caesar received the following manual therapy techniques for 12 minutes:   -STM over lateral epicondyle     moist  Heat to right elbow X 10 minutes   Caesar received therapeutic exercises for 10 minutes including:    -wrist flexion stretches ,  Green  flex bar eccentric wrist extension, pro, sup  ,  weighted hammer 1# pro. Supination., hand gripper 35# X 3 minutes. , weight well 3# X 2 minutes       Home Exercise Program/Education:  Issued HEP: wrist flexion , stretches, , use of ice, continue  limiting bowling to 1 time a week.        and educated on modality use for pain management . Exercises were reviewed and Caesar was able to demonstrate them prior to the end of the session.   Pt received a written copy of exercises to perform at home. Caesar demonstrated good  understanding of the education provided.  Pt was advised to perform these exercises free of pain, and to stop performing them if pain occurs.     Patient/Family Education: role of OT, goals for OT, scheduling/cancellations - pt verbalized understanding. Discussed insurance limitations with patient.           Assessment      Caesar Arevalo is a 67 y.o. male referred to outpatient occupational/hand therapy and presents with a medical diagnosis of right lateral epicondylitis, resulting in weak , decreased Range of motion , Pt reports that pain is overall better and he will try to bowl tomorrow with the lightest bowling ball he has. Pt was able to tolerate increased resistance in therapy pain free.      The following goals were discussed with the patient and patient is in agreement with them as to be addressed in the treatment plan.            GOALS: 8 weeks. Pt agrees with goals set.  Goals:      Short Term (6 weeks on 7/31/18):  1)   Patient to be IND with HEP and modalities for pain management  2)   Increase ROM 10 degrees in wrist ext  motion to increase functional hand use for right /lift    3)   Increase  strength by  20 lbs. to grasp right hand   4)   Increase pinch 1-3 psis for  10     Long Term (by discharge):  1)   Pt will report 2/10 out of 10 pain with right   2)   Patient to score of CJ on FOTO to demonstrate improved perception of functionalright hand/ arm  use.  3)   Pt will return to prior level of function for ADLs and  household management                 Plan   Certification Period/Plan of care expiration: 6/26/2018 to 8/31/18.   assess  and strength next visit .     Outpatient Occupational Therapy 1 times weekly for 6 weeks may include the following interventions: US 3 mhz, Therapeutic exercises/activities., Iontophoresis with 2.0 cc Dexamethasone and Strengthening.        Leidy Santos, OT

## 2018-08-07 ENCOUNTER — CLINICAL SUPPORT (OUTPATIENT)
Dept: REHABILITATION | Facility: HOSPITAL | Age: 68
End: 2018-08-07
Attending: FAMILY MEDICINE
Payer: MEDICARE

## 2018-08-07 DIAGNOSIS — M25.521 RIGHT ELBOW PAIN: ICD-10-CM

## 2018-08-07 PROCEDURE — 97110 THERAPEUTIC EXERCISES: CPT

## 2018-08-07 NOTE — PROGRESS NOTES
Occupational therapy daily note       Name: Caesar Arevalo  Clinic Number: [id number]     Therapy Diagnosis: No diagnosis found.  Physician: Sophie Yan MD     Physician Orders: eval and treat   Medical Diagnosis: M19.029 Elbow Arthritis   Surgical Procedure/ Date :none   Evaluation Date: 6/26/2018  Insurance:medicare and    Insurance Authorization period Expiration: 12/31/18   Plan of Care Certification Period: 8/31/18     Visit # / Visits Authortized:8/ 20         Precautions: Standard     Subjective       Pt reports that he  Did bowl last Wednesday ,  3 games and it seem to feel okay ,  . He did purchase a lighter bowling ball 13.2 # , it seems a little better than the 14.5# bowling ball.         Involved Side:  right  Dominant Side: Right  Date of Onset: several months ago  Mechanism of Injury:  Pain started in elbow then he bowled 3 games in a row one night then  The pain was more intense .   History of Current Condition: pain started and has increased to effect lifting , grasping  And bowling      Imaging: xray   Previous Therapy: none      Patient's Goals for Therapy: full use of right arm no pain  When bowling      Pain:  Functional Pain Scale Rating 0-10:   2/10 on average  2/10  at best, reports that the pain is intermittent    2/10  at worst  Locationright:elbow lateral   Description: Dull  Aggravating Factors: Extending wrist   Easing Factors: ice     Occupation:   Retired   Working presently: retired   Duties: very active with [employer]    Leisure: bowling , he is still bowling one time a year         Past Medical History/Physical Systems Review:   Caesar Arevalo  has a past medical history of Bell's palsy; Diabetes mellitus, type 2; Hypertension; and TB lung, latent.     Caesar Arevalo  has a past surgical history that includes plantar warts; Knee ligament reconstruction; Prostate surgery; Hernia repair (04/2016); and Colonoscopy (N/A, 7/25/2016).     Caesar has a current medication  list which includes the following prescription(s): amlodipine, aspirin, atorvastatin, blood sugar diagnostic, blood-glucose meter, cholecalciferol (vitamin d3), lancets, linagliptin, lisinopril, loratadine, metformin, multivit-min-folic-vit k-lycop, naftifine, omega-3 fatty acids, and tamsulosin.     Review of patient's allergies indicates:  No Known Allergies         Objective      Observation/Appearance:  Skin intact      A ROM. Measured in degrees.    6/26/2018 6/26/2018 7/31/18       Left Right  right               Elbow flex 135 140  140              Ext    0 0            Wristext/ flex  40/40 40/40 40/40/             pro 90 90 90              Sup    90 90 90              RD    15 15  15            UD 20 20 20           Strength (Dyanmometer) and Pinch Strength (Pinch Gauge)  Measured in pounds and psi. Average of three trials.    6/26/2018 6/26/2018 7/31/18      right   left            right    Rung II 32 72               80   [place]uke Pinch 9 7                8   3pt Pinch 9 5                6   2pt Pinch 8 4                6         CMS Impairment/Limitation/Restriction for FOTO initial  Survey     Therapist reviewed FOTO scores for Caesar Arevalo on 7/24/18    FOTO documents entered into [employer] - see Media section.                   Limitation Score: 31% , CJ   Category: Self care            Current : CK = at least 40% but < 60% impaired, limited or restricted  Goal: CJ = at least 20% but < 40% impaired, limited or restricted  Discharge:             Treatment      Treatment Time In: 2:30  Treatment Time Out: 3:30      Caesar received the following manual therapy techniques for 12 minutes:   -STM over lateral epicondyle     moist  Heat to right elbow X 10 minutes   Caesar received therapeutic exercises for 10 minutes including:    -wrist flexion stretches ,  Green  flex bar eccentric wrist extension, pro, sup  , weighted hammer 1# pro. Supination., hand gripper 35# X 3 minutes. , weight well 3# X 2  minutes       Home Exercise Program/Education:  Issued HEP: wrist flexion , stretches, , use of ice, continue  limiting bowling to 1 time a week.        and educated on modality use for pain management . Exercises were reviewed and Caesar was able to demonstrate them prior to the end of the session.   Pt received a written copy of exercises to perform at home. Caesar demonstrated good  understanding of the education provided.  Pt was advised to perform these exercises free of pain, and to stop performing them if pain occurs.     Patient/Family Education: role of OT, goals for OT, scheduling/cancellations - pt verbalized understanding. Discussed insurance limitations with patient.           Assessment      Caesar Arevalo is a 67 y.o. male referred to outpatient occupational/hand therapy and presents with a medical diagnosis of right lateral epicondylitis,  Pt with significant decrease in pain and he is able to play 1 time a  Week for bowling .  Pt was able to tolerate increased resistance in therapy pain free.      The following goals were discussed with the patient and patient is in agreement with them as to be addressed in the treatment plan.            GOALS: 8 weeks. Pt agrees with goals set.  Goals:      Short Term   1)   Patient to be IND with HEP and modalities for pain management  2)   Increase ROM 10 degrees in wrist ext  motion to increase functional hand use for right /lift  . Met   3)   Increase  strength by  20 lbs. to grasp right hand   4)   Increase pinch 1-3 psis for  10     Long Term (by discharge):  1)   Pt will report 2/10 out of 10 pain with right   2)   Patient to score of CJ on FOTO to demonstrate improved perception of functionalright hand/ arm  use.  3)   Pt will return to prior level of function for ADLs and household management                 Plan   Certification Period/Plan of care expiration: 6/26/2018 to 8/31/18.   assess  and strength next visit .     Outpatient  Occupational Therapy 1 times weekly for 6 weeks may include the following interventions: US 3 mhz, Therapeutic exercises/activities., Iontophoresis with 2.0 cc Dexamethasone and Strengthening.     Pt to be seen one more visit then consider d/c , pt is able to play 1 bowling game a week with no adverse effects .      Leidy Santos, OT

## 2018-08-08 ENCOUNTER — LAB VISIT (OUTPATIENT)
Dept: LAB | Facility: HOSPITAL | Age: 68
End: 2018-08-08
Attending: FAMILY MEDICINE
Payer: MEDICARE

## 2018-08-08 DIAGNOSIS — E11.40 TYPE 2 DIABETES MELLITUS WITH DIABETIC NEUROPATHY, WITHOUT LONG-TERM CURRENT USE OF INSULIN: ICD-10-CM

## 2018-08-08 LAB
ESTIMATED AVG GLUCOSE: 163 MG/DL
HBA1C MFR BLD HPLC: 7.3 %

## 2018-08-08 PROCEDURE — 36415 COLL VENOUS BLD VENIPUNCTURE: CPT

## 2018-08-08 PROCEDURE — 83036 HEMOGLOBIN GLYCOSYLATED A1C: CPT

## 2018-08-09 ENCOUNTER — TELEPHONE (OUTPATIENT)
Dept: INTERNAL MEDICINE | Facility: CLINIC | Age: 68
End: 2018-08-09

## 2018-08-09 DIAGNOSIS — E11.40 TYPE 2 DIABETES MELLITUS WITH DIABETIC NEUROPATHY, WITHOUT LONG-TERM CURRENT USE OF INSULIN: Primary | ICD-10-CM

## 2018-08-09 NOTE — TELEPHONE ENCOUNTER
Informed pt of lab results and schedule pt for A1c on 9/14/18. Pt verbalized understanding. Pt has no further questions or concerns.

## 2018-08-14 ENCOUNTER — CLINICAL SUPPORT (OUTPATIENT)
Dept: REHABILITATION | Facility: HOSPITAL | Age: 68
End: 2018-08-14
Attending: FAMILY MEDICINE
Payer: MEDICARE

## 2018-08-14 DIAGNOSIS — M25.521 RIGHT ELBOW PAIN: Primary | ICD-10-CM

## 2018-08-14 PROCEDURE — G8988 SELF CARE GOAL STATUS: HCPCS | Mod: CJ

## 2018-08-14 PROCEDURE — G8989 SELF CARE D/C STATUS: HCPCS | Mod: CJ

## 2018-08-14 PROCEDURE — 97110 THERAPEUTIC EXERCISES: CPT

## 2018-08-14 PROCEDURE — G8987 SELF CARE CURRENT STATUS: HCPCS | Mod: CJ

## 2018-08-14 NOTE — PROGRESS NOTES
Occupational therapy daily note , discharge note    8/14/18       Name: Caesar Arevalo  Clinic Number: [id number]     Therapy Diagnosis: No diagnosis found.  Physician: Sophie Yan MD     Physician Orders: eval and treat   Medical Diagnosis: M19.029 Elbow Arthritis   Surgical Procedure/ Date :none   Evaluation Date: 6/26/2018  Insurance:medicare and    Insurance Authorization period Expiration: 12/31/18   Plan of Care Certification Period: 8/31/18     Visit # / Visits Authortized:9/ 20         Precautions: Standard     Subjective       Pt reports that he  Did bowl last Wednesday ,  3 games and it seem to feel okay ,  . He did purchase a lighter bowling ball 13.2 # , it seems a little better than the 14.5# bowling ball.  Pt feels that he has great motion and the pain is down 3/10 , he can now perform bowling  With minimal pain.         Involved Side:  right  Dominant Side: Right  Date of Onset: several months ago  Mechanism of Injury:  Pain started in elbow then he bowled 3 games in a row one night then  The pain was more intense .   History of Current Condition: pain started and has increased to effect lifting , grasping  And bowling      Imaging: xray   Previous Therapy: none      Patient's Goals for Therapy: full use of right arm no pain  When bowling      Pain:  Functional Pain Scale Rating 0-10:   2/10 on average  2/10  at best, reports that the pain is intermittent    2/10  at worst  Locationright:elbow lateral   Description: Dull  Aggravating Factors: Extending wrist   Easing Factors: ice     Occupation:   Retired   Working presently: retired   Duties: very active with [employer]    Leisure: bowling , he is still bowling one time a year         Past Medical History/Physical Systems Review:   Caesar Arevalo  has a past medical history of Bell's palsy; Diabetes mellitus, type 2; Hypertension; and TB lung, latent.     Caesar Arevalo  has a past surgical history that includes plantar warts;  Knee ligament reconstruction; Prostate surgery; Hernia repair (04/2016); and Colonoscopy (N/A, 7/25/2016).     Caesar has a current medication list which includes the following prescription(s): amlodipine, aspirin, atorvastatin, blood sugar diagnostic, blood-glucose meter, cholecalciferol (vitamin d3), lancets, linagliptin, lisinopril, loratadine, metformin, multivit-min-folic-vit k-lycop, naftifine, omega-3 fatty acids, and tamsulosin.     Review of patient's allergies indicates:  No Known Allergies         Objective      Observation/Appearance:  Skin intact      A ROM. Measured in degrees.    6/26/2018 6/26/2018 7/31/18 8/14/18       Left Right  right        right              Elbow flex 135 140  140         140              Ext    0 0            Wristext/ flex  40/40 40/40 40/40/    40/40             pro 90 90 90         90             Sup    90 90 90         90              RD    15 15  15       15             UD 20 20 20         20           Strength (Dyanmometer) and Pinch Strength (Pinch Gauge)  Measured in pounds and psi. Average of three trials.    6/26/2018 6/26/2018 7/31/18 8/14/18       right   left            right       Right    Rung II 32 72               80       90   Duke Pinch 9 7                8          10   3pt Pinch 9 5                6          8   2pt Pinch 8 4                6            8         CMS Impairment/Limitation/Restriction for FOTO initial  Survey     Therapist reviewed FOTO scores for Caesar Arevalo on  8/14/ 18    FOTO documents entered into [employer] - see Media section.                   Limitation Score: 33% , CJ   Category: Self care            Current : CK = at least 40% but < 60% impaired, limited or restricted  Goal: CJ = at least 20% but < 40% impaired, limited or restricted  Discharge:  CJ , goal met             Treatment        Treatment Time In: 2:30  Treatment Time Out: 3:30        Caesar received the following manual therapy techniques for 12 minutes:    -STM over lateral epicondyle     moist  Heat to right elbow X 10 minutes   Caesar received therapeutic exercises for 10 minutes including:    -wrist flexion stretches ,  Green  flex bar eccentric wrist extension, pro, sup  , weighted hammer 1# pro. Supination., hand gripper 35# X 3 minutes. , weight well 3# X 2 minutes       Home Exercise Program/Education:  Issued HEP: wrist flexion , stretches, , use of ice, continue  limiting bowling to 1 time a week.        and educated on modality use for pain management . Exercises were reviewed and Caesar was able to demonstrate them prior to the end of the session.   Pt received a written copy of exercises to perform at home. Caesar demonstrated good  understanding of the education provided.  Pt was advised to perform these exercises free of pain, and to stop performing them if pain occurs.     Patient/Family Education: role of OT, goals for OT, scheduling/cancellations - pt verbalized understanding. Discussed insurance limitations with patient.           Assessment      Caesar Arevalo is a 67 y.o. male referred to outpatient occupational/hand therapy and presents with a medical diagnosis of right lateral epicondylitis,  Pt with significant decrease in pain and he is able to play 1 time a  Week for bowling .  Pt was able to tolerate increased resistance in therapy pain free.      The following goals were discussed with the patient and patient is in agreement with them as to be addressed in the treatment plan.            GOALS: 8 weeks. Pt agrees with goals set.  Goals:      Short Term   1)   Patient to be IND with HEP and modalities for pain management  2)   Increase ROM 10 degrees in wrist ext  motion to increase functional hand use for right /lift  . Met   3)   Increase  strength by  20 lbs. to grasp right hand , met   4)   Increase pinch 1-3 psis for  10 , met      Long Term (by discharge):  1)   Pt will report 2/10 out of 10 pain with right , met   2)    Patient to score of CJ on FOTO to demonstrate improved perception of functionalright hand/ arm  use. Met   3)   Pt will return to prior level of function for ADLs and household management, met                  Plan   Certification Period/Plan of care expiration: 6/26/2018 to 8/31/18.   assess  and strength next visit .    Pt to be d/c from OT services and will return to bowling and other task.        Leidy Santos, OT

## 2018-08-28 ENCOUNTER — OFFICE VISIT (OUTPATIENT)
Dept: OPTOMETRY | Facility: CLINIC | Age: 68
End: 2018-08-28
Payer: MEDICARE

## 2018-08-28 DIAGNOSIS — Q14.1 CONGENITAL HYPERTROPHY OF RETINAL PIGMENT EPITHELIUM: ICD-10-CM

## 2018-08-28 DIAGNOSIS — Z79.84 LONG TERM CURRENT USE OF ORAL HYPOGLYCEMIC DRUG: ICD-10-CM

## 2018-08-28 DIAGNOSIS — H25.13 NUCLEAR SCLEROTIC CATARACT OF BOTH EYES: ICD-10-CM

## 2018-08-28 DIAGNOSIS — E11.9 TYPE 2 DIABETES MELLITUS WITHOUT RETINOPATHY: Primary | ICD-10-CM

## 2018-08-28 DIAGNOSIS — H52.13 MYOPIA WITH PRESBYOPIA OF BOTH EYES: ICD-10-CM

## 2018-08-28 DIAGNOSIS — H52.4 MYOPIA WITH PRESBYOPIA OF BOTH EYES: ICD-10-CM

## 2018-08-28 PROCEDURE — 99999 PR PBB SHADOW E&M-EST. PATIENT-LVL III: CPT | Mod: PBBFAC,,, | Performed by: OPTOMETRIST

## 2018-08-28 PROCEDURE — 92015 DETERMINE REFRACTIVE STATE: CPT | Mod: ,,, | Performed by: OPTOMETRIST

## 2018-08-28 PROCEDURE — 92014 COMPRE OPH EXAM EST PT 1/>: CPT | Mod: S$PBB,,, | Performed by: OPTOMETRIST

## 2018-08-28 PROCEDURE — 99213 OFFICE O/P EST LOW 20 MIN: CPT | Mod: PBBFAC | Performed by: OPTOMETRIST

## 2018-08-28 NOTE — PROGRESS NOTES
HPI     Mr. Caesar Arevalo is here for his annual diabetic eye exam.    Patient states no complaints about eyes, vision, or glasses today.   Would patient like a refraction today? Yes    (+)drops: Refresh q day   (-)flashes  (-)floaters  (-)diplopia    Diabetic yes   Hemoglobin A1C       Date                     Value               Ref Range             Status           08/08/2018               7.3 (H)             4.0 - 5.6 %         Final  05/25/2018               8.4 (H)             4.0 - 5.6 %         Final  05/08/2018               8.6 (H)             4.0 - 5.6 %         Final      OCULAR HISTORY  Last Eye Exam 08/25/17 with Dr. Ness  (-)eye surgery   Cataracts OU  CHRPE OS  Dry eyes    FAMILY HISTORY  (-)Glaucoma none         Last edited by Rema Ness, OD on 8/28/2018  3:28 PM. (History)            Assessment /Plan     For exam results, see Encounter Report.    Type 2 diabetes mellitus without retinopathy  Long term current use of oral hypoglycemic drug   No retinopathy noted OU. Monitor with yearly DFE.     Nuclear sclerotic cataract of both eyes   Stable and minimally visually significant. Monitor.    Congenital hypertrophy of retinal pigment epithelium   OS. Stable compared to 2016 photos. Monitor.    Myopia with presbyopia of both eyes   Small change OU. New glasses prescription released, adaptation expected.  New glasses optional.   Eyeglass Final Rx     Eyeglass Final Rx       Sphere Cylinder Add    Right -0.25 Sphere +2.50    Left -0.25 Sphere +2.50    Expiration Date:  8/29/2019                   RTC 1 year

## 2018-08-30 ENCOUNTER — OFFICE VISIT (OUTPATIENT)
Dept: INTERNAL MEDICINE | Facility: CLINIC | Age: 68
End: 2018-08-30
Payer: MEDICARE

## 2018-08-30 VITALS
WEIGHT: 238.19 LBS | SYSTOLIC BLOOD PRESSURE: 160 MMHG | HEART RATE: 80 BPM | HEIGHT: 71 IN | DIASTOLIC BLOOD PRESSURE: 82 MMHG | BODY MASS INDEX: 33.35 KG/M2 | OXYGEN SATURATION: 97 %

## 2018-08-30 DIAGNOSIS — E11.40 TYPE 2 DIABETES MELLITUS WITH DIABETIC NEUROPATHY, WITHOUT LONG-TERM CURRENT USE OF INSULIN: Primary | ICD-10-CM

## 2018-08-30 DIAGNOSIS — L73.9 FOLLICULITIS: ICD-10-CM

## 2018-08-30 DIAGNOSIS — M25.521 RIGHT ELBOW PAIN: ICD-10-CM

## 2018-08-30 PROCEDURE — 90662 IIV NO PRSV INCREASED AG IM: CPT | Mod: PBBFAC,PN

## 2018-08-30 PROCEDURE — 99213 OFFICE O/P EST LOW 20 MIN: CPT | Mod: PBBFAC,PN | Performed by: FAMILY MEDICINE

## 2018-08-30 PROCEDURE — 99999 PR PBB SHADOW E&M-EST. PATIENT-LVL III: CPT | Mod: PBBFAC,,, | Performed by: FAMILY MEDICINE

## 2018-08-30 PROCEDURE — 99214 OFFICE O/P EST MOD 30 MIN: CPT | Mod: S$PBB,,, | Performed by: FAMILY MEDICINE

## 2018-08-30 RX ORDER — MUPIROCIN 20 MG/G
OINTMENT TOPICAL 3 TIMES DAILY
Qty: 30 G | Refills: 0 | Status: SHIPPED | OUTPATIENT
Start: 2018-08-30 | End: 2019-04-09

## 2018-08-30 RX ORDER — METFORMIN HYDROCHLORIDE 1000 MG/1
1000 TABLET ORAL 2 TIMES DAILY WITH MEALS
Qty: 180 TABLET | Refills: 1 | Status: SHIPPED | OUTPATIENT
Start: 2018-08-30 | End: 2019-04-09 | Stop reason: SDUPTHER

## 2018-08-30 RX ORDER — DICLOFENAC SODIUM 10 MG/G
2 GEL TOPICAL DAILY
Qty: 100 G | Refills: 0 | Status: SHIPPED | OUTPATIENT
Start: 2018-08-30

## 2018-08-30 NOTE — PROGRESS NOTES
After obtaining consent, and per orders of Dr. Yna, injection of fluzone Lot [id number] Exp 3/18/19 given in the LD by MARIBEL MIRAMONTES. Patient tolerated well and band aid applied. Patient instructed to remain in clinic for 15 minutes afterwards, and to report any adverse reaction to me immediately.

## 2018-08-30 NOTE — PROGRESS NOTES
"Subjective:       Patient ID: Caesar Arevalo is a 67 y.o. male.    Chief Complaint: No chief complaint on file.    HPI  66 y/o former smoker with DM2, HTN, BPH, Vit D def, CLBP, colon polyp (tubular adenoma) is here for follow up HTN.  He notes he has bumps in his scalp, he has itching and soreness in the area, he has used "no bump" and alcohol.  He is not very active, but he is bowling. Denies f/body aches/n/v/d/constipation/sob/cp, has urinary frequency that has improved some on flomax, denies dysuria/urgency.  Sleeping ok, does not want to be re-evaluated in sleep clinic. Appetite ok.  R arm pain for a month, comes and goes, started during bowling, pain is over R elbow, cannot describe pain, 5/10, not sure the trigger, soft sleeve helps some, ice helps some  DM2: a1c 7.3 8/2018,he is limiting candy, he is checking 3 times a day, low 90's high 200, on statin, ace, trajenta, eye exam utd and foot exam utd  HTN: did not take meds today  BPH: Hx of prostate procedure, followed by urology, doing a little better on low dose flomax    Review of Systems   Constitutional: Negative for activity change, appetite change, fatigue and fever.   Respiratory: Negative for cough and shortness of breath.    Cardiovascular: Negative for chest pain, palpitations and leg swelling.   Gastrointestinal: Negative for constipation, diarrhea, nausea and vomiting.   Genitourinary: Negative for difficulty urinating and dysuria.   Skin: Positive for rash.   Psychiatric/Behavioral: Negative for sleep disturbance.       Objective:      BP (!) 160/82 (BP Location: Right arm, Patient Position: Sitting, BP Method: Large (Manual))   Pulse 80   Ht 5' 11" (1.803 m)   Wt 108 kg (238 lb 3.3 oz)   SpO2 97%   BMI 33.22 kg/m²   Physical Exam   Constitutional: He appears well-developed and well-nourished.   HENT:   Head: Normocephalic and atraumatic.   Mouth/Throat: No oropharyngeal exudate.   Neck: Normal range of motion. Neck supple. No thyromegaly " present.   Cardiovascular: Normal rate, regular rhythm and normal heart sounds.   Pulmonary/Chest: Effort normal and breath sounds normal. No respiratory distress.   Musculoskeletal: He exhibits no edema.   Lymphadenopathy:     He has no cervical adenopathy.   Neurological: He is alert.   Skin: Skin is warm and dry.   Psychiatric: He has a normal mood and affect.   Nursing note and vitals reviewed.      Assessment:       1. Type 2 diabetes mellitus with diabetic neuropathy, without long-term current use of insulin    2. Folliculitis    3. Right elbow pain        Plan:   Diagnoses and all orders for this visit:    Type 2 diabetes mellitus with diabetic neuropathy, without long-term current use of insulin  -     linagliptin (TRADJENTA) 5 mg Tab tablet; Take 1 tablet (5 mg total) by mouth once daily.  -     metFORMIN (GLUCOPHAGE) 1000 MG tablet; Take 1 tablet (1,000 mg total) by mouth 2 (two) times daily with meals.    Folliculitis  -     mupirocin (BACTROBAN) 2 % ointment; Apply topically 3 (three) times daily.  -     Ambulatory referral to Dermatology    Right elbow pain  -     diclofenac sodium 1 % Gel; Apply 2 g topically once daily.    Other orders  -     Influenza - High Dose (65+) (PF) (IM)

## 2018-08-31 ENCOUNTER — PATIENT MESSAGE (OUTPATIENT)
Dept: UROLOGY | Facility: CLINIC | Age: 68
End: 2018-08-31

## 2018-09-13 ENCOUNTER — TELEPHONE (OUTPATIENT)
Dept: INTERNAL MEDICINE | Facility: CLINIC | Age: 68
End: 2018-09-13

## 2018-09-13 ENCOUNTER — CLINICAL SUPPORT (OUTPATIENT)
Dept: INTERNAL MEDICINE | Facility: CLINIC | Age: 68
End: 2018-09-13
Payer: MEDICARE

## 2018-09-13 VITALS — DIASTOLIC BLOOD PRESSURE: 82 MMHG | HEART RATE: 88 BPM | SYSTOLIC BLOOD PRESSURE: 132 MMHG

## 2018-09-13 DIAGNOSIS — I10 ESSENTIAL HYPERTENSION: ICD-10-CM

## 2018-09-13 PROCEDURE — 99999 PR PBB SHADOW E&M-EST. PATIENT-LVL I: CPT | Mod: PBBFAC,,,

## 2018-09-13 PROCEDURE — 99211 OFF/OP EST MAY X REQ PHY/QHP: CPT | Mod: PBBFAC,PN

## 2018-09-13 NOTE — TELEPHONE ENCOUNTER
Caesar Arevalo 67 y.o. male is here today for Blood Pressure check.   History of HTN yes.    Patient verifies taking blood pressure medications. Last dose of blood pressure medication was taken at 8AM. Patient took lisinopril only this morning.     Does patient have record of home blood pressure readings yes. Readings have been averaging 130's/80's.     Patient is asymptomatic. Patient denies Headache, chest pain, blurred vision, left arm pain, right arm pain, leg pain, and/or swelling in the ankles or feet. Patient has no complaints today.     Blood pressure reading was 132/82, Pulse 88.     Pt's Home blood pressure machine read 136/88, Pulse 90.     Pt's Home blood pressure machine is accurate.

## 2018-09-13 NOTE — PROGRESS NOTES
Caesar Arevalo 67 y.o. male is here today for Blood Pressure check.   History of HTN yes.    Patient verifies taking blood pressure medications. Last dose of blood pressure medication was taken at 8AM. Patient took lisinopril only this morning.       Current Outpatient Medications:     amLODIPine (NORVASC) 10 MG tablet, TAKE 1 TABLET EVERY MORNING, Disp: 90 tablet, Rfl: 1    aspirin (ECOTRIN) 81 MG EC tablet, Take 81 mg by mouth once daily., Disp: , Rfl:     atorvastatin (LIPITOR) 20 MG tablet, TAKE 1 TABLET EVERY MORNING, Disp: 90 tablet, Rfl: 1    blood sugar diagnostic Strp, 1 strip by Misc.(Non-Drug; Combo Route) route 3 (three) times daily. freestyle, Disp: 300 strip, Rfl: 6    blood-glucose meter (FREESTYLE SYSTEM KIT) kit, Use as instructed, Disp: 1 each, Rfl: 0    cholecalciferol, vitamin D3, (VITAMIN D3) 1,000 unit capsule, Take 1,000 Units by mouth once daily., Disp: , Rfl:     diclofenac sodium 1 % Gel, Apply 2 g topically once daily., Disp: 100 g, Rfl: 0    lancets (FREESTYLE LANCETS) 28 gauge Misc, 1 lancet by Other route 3 (three) times daily., Disp: 100 each, Rfl: 5    linagliptin (TRADJENTA) 5 mg Tab tablet, Take 1 tablet (5 mg total) by mouth once daily., Disp: 90 tablet, Rfl: 1    lisinopril (PRINIVIL,ZESTRIL) 30 MG tablet, Take 1 tablet (30 mg total) by mouth once daily., Disp: 90 tablet, Rfl: 1    loratadine (CLARITIN) 10 mg tablet, TAKE 1 TABLET DAILY, Disp: 90 tablet, Rfl: 1    metFORMIN (GLUCOPHAGE) 1000 MG tablet, Take 1 tablet (1,000 mg total) by mouth 2 (two) times daily with meals., Disp: 180 tablet, Rfl: 1    multivit-min-folic-vit K-lycop (ONE-A-DAY MEN'S 50 PLUS) 400- mcg Tab, Take by mouth., Disp: , Rfl:     mupirocin (BACTROBAN) 2 % ointment, Apply topically 3 (three) times daily., Disp: 30 g, Rfl: 0    naftifine 2 % Gel, Apply 1 application topically once daily., Disp: 60 g, Rfl: 2    omega-3 fatty acids (FISH OIL) 300 mg Cap, Take 300 mg by mouth., Disp: ,  Rfl:     tamsulosin (FLOMAX) 0.4 mg Cap, Take 1 capsule (0.4 mg total) by mouth every evening., Disp: 90 capsule, Rfl: 2    Does patient have record of home blood pressure readings yes. Readings have been averaging 130's/80's.     Patient is asymptomatic. Patient denies Headache, chest pain, blurred vision, left arm pain, right arm pain, leg pain, and/or swelling in the ankles or feet. Patient has no complaints today.     Blood pressure reading was 132/82, Pulse 88.     Pt's Home blood pressure machine read 136/88, Pulse 90.     Pt's Home blood pressure machine is accurate.     Review of patient's allergies indicates:  No Known Allergies  Creatinine   Date Value Ref Range Status   05/25/2018 1.4 0.5 - 1.4 mg/dL Final     Sodium   Date Value Ref Range Status   05/25/2018 139 136 - 145 mmol/L Final     Potassium   Date Value Ref Range Status   05/25/2018 4.0 3.5 - 5.1 mmol/L Final   ]    Dr. Yan notified.

## 2018-09-14 ENCOUNTER — LAB VISIT (OUTPATIENT)
Dept: LAB | Facility: HOSPITAL | Age: 68
End: 2018-09-14
Attending: FAMILY MEDICINE
Payer: MEDICARE

## 2018-09-14 DIAGNOSIS — E11.40 TYPE 2 DIABETES MELLITUS WITH DIABETIC NEUROPATHY, WITHOUT LONG-TERM CURRENT USE OF INSULIN: ICD-10-CM

## 2018-09-14 LAB
ESTIMATED AVG GLUCOSE: 166 MG/DL
HBA1C MFR BLD HPLC: 7.4 %

## 2018-09-14 PROCEDURE — 36415 COLL VENOUS BLD VENIPUNCTURE: CPT

## 2018-09-14 PROCEDURE — 83036 HEMOGLOBIN GLYCOSYLATED A1C: CPT

## 2018-09-17 ENCOUNTER — OFFICE VISIT (OUTPATIENT)
Dept: DERMATOLOGY | Facility: CLINIC | Age: 68
End: 2018-09-17
Payer: MEDICARE

## 2018-09-17 ENCOUNTER — PATIENT MESSAGE (OUTPATIENT)
Dept: INTERNAL MEDICINE | Facility: CLINIC | Age: 68
End: 2018-09-17

## 2018-09-17 ENCOUNTER — TELEPHONE (OUTPATIENT)
Dept: INTERNAL MEDICINE | Facility: CLINIC | Age: 68
End: 2018-09-17

## 2018-09-17 DIAGNOSIS — L73.9 FOLLICULITIS: Primary | ICD-10-CM

## 2018-09-17 PROCEDURE — 99212 OFFICE O/P EST SF 10 MIN: CPT | Mod: PBBFAC | Performed by: NURSE PRACTITIONER

## 2018-09-17 PROCEDURE — 99214 OFFICE O/P EST MOD 30 MIN: CPT | Mod: S$PBB,ICN,, | Performed by: NURSE PRACTITIONER

## 2018-09-17 PROCEDURE — 99999 PR PBB SHADOW E&M-EST. PATIENT-LVL II: CPT | Mod: PBBFAC,,, | Performed by: NURSE PRACTITIONER

## 2018-09-17 RX ORDER — KETOCONAZOLE 20 MG/ML
SHAMPOO, SUSPENSION TOPICAL
Qty: 120 ML | Refills: 5 | Status: SHIPPED | OUTPATIENT
Start: 2018-09-17 | End: 2019-03-26

## 2018-09-17 RX ORDER — CLINDAMYCIN PHOSPHATE 11.9 MG/ML
SOLUTION TOPICAL
Qty: 60 ML | Refills: 1 | Status: SHIPPED | OUTPATIENT
Start: 2018-09-17 | End: 2019-03-26

## 2018-09-17 RX ORDER — LISINOPRIL 40 MG/1
40 TABLET ORAL DAILY
Qty: 90 TABLET | Refills: 1 | Status: SHIPPED | OUTPATIENT
Start: 2018-09-17 | End: 2019-02-26

## 2018-09-17 NOTE — TELEPHONE ENCOUNTER
----- Message from Denise Velásquez sent at 9/17/2018  2:57 PM CDT -----  Contact: self  Pt would like to know if he should keep his appt tomorrow or not.  He would like to speak with the nurse.    Pt can be reached at [phone]

## 2018-09-17 NOTE — LETTER
September 17, 2018      Sophie Yan MD  [address] [place] [address]  [address]  [place] [address]           [facility]at[facility]  [address]  [place] [address]  Phone: [phone]  Fax: [phone]          Patient: Caesar Arevalo   MR Number: [id number]   YOB: 1950   Date of Visit: 9/17/2018       Dear Dr. Sophie Yan:    Thank you for referring Caesar Arevalo to me for evaluation. Attached you will find relevant portions of my assessment and plan of care.    If you have questions, please do not hesitate to call me. I look forward to following Caesar Arevalo along with you.    Sincerely,    Alexa Jewell, NP    Enclosure  CC:  No Recipients    If you would like to receive this communication electronically, please contact [email or web address] or [phone] to request more information on [employer] Link access.    For providers and/or their staff who would like to refer a patient to [facility], please contact us through our one-stop-shop provider referral line, [facility] , at [phone].    If you feel you have received this communication in error or would no longer like to receive these types of communications, please e-mail [email or web address]

## 2018-09-17 NOTE — TELEPHONE ENCOUNTER
informed pt of the 40 mg lisinopril increase. Pt will call back to schedule nurse visit in 2 weeks.

## 2018-09-17 NOTE — PROGRESS NOTES
Subjective:       Patient ID:  Caesar Arevalo is a 67 y.o. male who presents for   Chief Complaint   Patient presents with    Hair/Scalp Problem     Bumps, 1 month, itching, rubbing alochol and bactroban ointment for treatment      Hair/Scalp Problem  - Initial  Affected locations: scalp  Duration: 1 month  Signs / symptoms: itching (bumps)  Aggravated by: nothing  Treatments tried: bactroban ointment.  Improvement on treatment: moderate        Review of Systems   Constitutional: Negative for fever, chills and fatigue.   Skin: Negative for daily sunscreen use, activity-related sunscreen use and recent sunburn.        Objective:    Physical Exam   Constitutional: He appears well-developed and well-nourished. No distress.   Neurological: He is alert and oriented to person, place, and time. He is not disoriented.   Psychiatric: He has a normal mood and affect.   Skin:   Areas Examined (abnormalities noted in diagram):   Scalp / Hair Palpated and Inspected  Head / Face Inspection Performed  Neck Inspection Performed              Diagram Legend     Erythematous scaling macule/papule c/w actinic keratosis       Vascular papule c/w angioma      Pigmented verrucoid papule/plaque c/w seborrheic keratosis      Yellow umbilicated papule c/w sebaceous hyperplasia      Irregularly shaped tan macule c/w lentigo     1-2 mm smooth white papules consistent with Milia      Movable subcutaneous cyst with punctum c/w epidermal inclusion cyst      Subcutaneous movable cyst c/w pilar cyst      Firm pink to brown papule c/w dermatofibroma      Pedunculated fleshy papule(s) c/w skin tag(s)      Evenly pigmented macule c/w junctional nevus     Mildly variegated pigmented, slightly irregular-bordered macule c/w mildly atypical nevus      Flesh colored to evenly pigmented papule c/w intradermal nevus       Pink pearly papule/plaque c/w basal cell carcinoma      Erythematous hyperkeratotic cursted plaque c/w SCC      Surgical scar with no  sign of skin cancer recurrence      Open and closed comedones      Inflammatory papules and pustules      Verrucoid papule consistent consistent with wart     Erythematous eczematous patches and plaques     Dystrophic onycholytic nail with subungual debris c/w onychomycosis     Umbilicated papule    Erythematous-base heme-crusted tan verrucoid plaque consistent with inflamed seborrheic keratosis     Erythematous Silvery Scaling Plaque c/w Psoriasis     See annotation      Assessment / Plan:        Folliculitis- scalp  -     ketoconazole (NIZORAL) 2 % shampoo; Wash hair with medicated shampoo at least 2x/week - let sit on scalp at least 5 minutes prior to rinsing  Dispense: 120 mL; Refill: 5  -     clindamycin (CLEOCIN T) 1 % external solution; Apply to scalp BID prn bumps  Dispense: 60 mL; Refill: 1    D/c Bactroban  Offered to send RX for itching, patient would like to defer at this time since feels like itching is improving             Follow-up if symptoms worsen or fail to improve.

## 2018-09-17 NOTE — TELEPHONE ENCOUNTER
Pt canceled his appt. tomorrow and states that he will start the new medication once it comes in through the mail order. Pt also states that he will call back to schedule the nurse visit once he starts the medication.

## 2018-10-03 ENCOUNTER — TELEPHONE (OUTPATIENT)
Dept: INTERNAL MEDICINE | Facility: CLINIC | Age: 68
End: 2018-10-03

## 2018-10-03 ENCOUNTER — CLINICAL SUPPORT (OUTPATIENT)
Dept: INTERNAL MEDICINE | Facility: CLINIC | Age: 68
End: 2018-10-03
Payer: MEDICARE

## 2018-10-03 VITALS — DIASTOLIC BLOOD PRESSURE: 82 MMHG | HEART RATE: 89 BPM | SYSTOLIC BLOOD PRESSURE: 146 MMHG

## 2018-10-03 PROCEDURE — 99211 OFF/OP EST MAY X REQ PHY/QHP: CPT | Mod: PBBFAC,PN

## 2018-10-03 PROCEDURE — 99999 PR PBB SHADOW E&M-EST. PATIENT-LVL I: CPT | Mod: PBBFAC,,,

## 2018-10-03 NOTE — PROGRESS NOTES
Caesar Arevalo 67 y.o. male is here today for Blood Pressure check.   History of HTN yes.      Patient verifies taking blood pressure medications. Last dose of blood pressure medication was taken at 8:30 AM.       Current Outpatient Medications:     amLODIPine (NORVASC) 10 MG tablet, TAKE 1 TABLET EVERY MORNING, Disp: 90 tablet, Rfl: 1    aspirin (ECOTRIN) 81 MG EC tablet, Take 81 mg by mouth once daily., Disp: , Rfl:     atorvastatin (LIPITOR) 20 MG tablet, TAKE 1 TABLET EVERY MORNING, Disp: 90 tablet, Rfl: 1    blood sugar diagnostic Strp, 1 strip by Misc.(Non-Drug; Combo Route) route 3 (three) times daily. freestyle, Disp: 300 strip, Rfl: 6    blood-glucose meter (FREESTYLE SYSTEM KIT) kit, Use as instructed, Disp: 1 each, Rfl: 0    cholecalciferol, vitamin D3, (VITAMIN D3) 1,000 unit capsule, Take 1,000 Units by mouth once daily., Disp: , Rfl:     clindamycin (CLEOCIN T) 1 % external solution, Apply to scalp BID prn bumps, Disp: 60 mL, Rfl: 1    diclofenac sodium 1 % Gel, Apply 2 g topically once daily., Disp: 100 g, Rfl: 0    ketoconazole (NIZORAL) 2 % shampoo, Wash hair with medicated shampoo at least 2x/week - let sit on scalp at least 5 minutes prior to rinsing, Disp: 120 mL, Rfl: 5    lancets (FREESTYLE LANCETS) 28 gauge Misc, 1 lancet by Other route 3 (three) times daily., Disp: 100 each, Rfl: 5    linagliptin (TRADJENTA) 5 mg Tab tablet, Take 1 tablet (5 mg total) by mouth once daily., Disp: 90 tablet, Rfl: 1    lisinopril (PRINIVIL,ZESTRIL) 40 MG tablet, Take 1 tablet (40 mg total) by mouth once daily., Disp: 90 tablet, Rfl: 1    loratadine (CLARITIN) 10 mg tablet, TAKE 1 TABLET DAILY, Disp: 90 tablet, Rfl: 1    metFORMIN (GLUCOPHAGE) 1000 MG tablet, Take 1 tablet (1,000 mg total) by mouth 2 (two) times daily with meals., Disp: 180 tablet, Rfl: 1    multivit-min-folic-vit K-lycop (ONE-A-DAY MEN'S 50 PLUS) 400- mcg Tab, Take by mouth., Disp: , Rfl:     mupirocin (BACTROBAN) 2 %  ointment, Apply topically 3 (three) times daily., Disp: 30 g, Rfl: 0    naftifine 2 % Gel, Apply 1 application topically once daily., Disp: 60 g, Rfl: 2    omega-3 fatty acids (FISH OIL) 300 mg Cap, Take 300 mg by mouth., Disp: , Rfl:     tamsulosin (FLOMAX) 0.4 mg Cap, Take 1 capsule (0.4 mg total) by mouth every evening., Disp: 90 capsule, Rfl: 2    Does patient have record of home blood pressure readings yes. Readings have been averaging 120's/80's.     Patient is asymptomatic. Patient denies Headache, chest pain, blurred vision, left arm pain, right arm pain, leg pain, and/or swelling in the ankles or feet. Patient has no complaints today.     Blood pressure reading was 146/82, Pulse 89.     Pt's BP machine was accurate at last office visit.       Review of patient's allergies indicates:  No Known Allergies  Creatinine   Date Value Ref Range Status   05/25/2018 1.4 0.5 - 1.4 mg/dL Final     Sodium   Date Value Ref Range Status   05/25/2018 139 136 - 145 mmol/L Final     Potassium   Date Value Ref Range Status   05/25/2018 4.0 3.5 - 5.1 mmol/L Final   ]    Dr. Yan notified.

## 2018-10-03 NOTE — TELEPHONE ENCOUNTER
Caesar Arevalo 67 y.o. male is here today for Blood Pressure check.   History of HTN yes.      Patient verifies taking blood pressure medications. Last dose of blood pressure medication was taken at 8:30 AM.       Does patient have record of home blood pressure readings yes. Readings have been averaging 120's/80's. Log on your desk     Patient is asymptomatic. Patient denies Headache, chest pain, blurred vision, left arm pain, right arm pain, leg pain, and/or swelling in the ankles or feet. Patient has no complaints today.     Blood pressure reading was 146/82, Pulse 89.     Pt's BP machine was accurate at last office visit.

## 2018-10-05 NOTE — TELEPHONE ENCOUNTER
I recommend we keep all his medications the same and have him follow up in 4 weeks.  Have him bring in a log at that time as well to his visit with me.

## 2018-10-05 NOTE — TELEPHONE ENCOUNTER
Called twice, someone picked up the phone, said nothing and hung up. Will try to contact patient through the portal.

## 2018-10-11 ENCOUNTER — LAB VISIT (OUTPATIENT)
Dept: LAB | Facility: HOSPITAL | Age: 68
End: 2018-10-11
Payer: MEDICARE

## 2018-10-11 DIAGNOSIS — N13.8 BPH WITH OBSTRUCTION/LOWER URINARY TRACT SYMPTOMS: ICD-10-CM

## 2018-10-11 DIAGNOSIS — N40.1 BPH WITH OBSTRUCTION/LOWER URINARY TRACT SYMPTOMS: ICD-10-CM

## 2018-10-11 LAB — COMPLEXED PSA SERPL-MCNC: 0.73 NG/ML

## 2018-10-11 PROCEDURE — 36415 COLL VENOUS BLD VENIPUNCTURE: CPT

## 2018-10-11 PROCEDURE — 84153 ASSAY OF PSA TOTAL: CPT

## 2018-10-16 ENCOUNTER — OFFICE VISIT (OUTPATIENT)
Dept: UROLOGY | Facility: CLINIC | Age: 68
End: 2018-10-16
Payer: MEDICARE

## 2018-10-16 VITALS
WEIGHT: 242 LBS | HEART RATE: 94 BPM | DIASTOLIC BLOOD PRESSURE: 90 MMHG | SYSTOLIC BLOOD PRESSURE: 157 MMHG | HEIGHT: 71 IN | BODY MASS INDEX: 33.88 KG/M2

## 2018-10-16 DIAGNOSIS — N40.1 BPH WITH OBSTRUCTION/LOWER URINARY TRACT SYMPTOMS: Primary | ICD-10-CM

## 2018-10-16 DIAGNOSIS — N13.8 BPH WITH OBSTRUCTION/LOWER URINARY TRACT SYMPTOMS: Primary | ICD-10-CM

## 2018-10-16 LAB
BILIRUB SERPL-MCNC: NORMAL MG/DL
BLOOD URINE, POC: NORMAL
COLOR, POC UA: YELLOW
GLUCOSE UR QL STRIP: NORMAL
KETONES UR QL STRIP: NORMAL
LEUKOCYTE ESTERASE URINE, POC: NORMAL
NITRAZINE PAPER TEST: NORMAL
NITRITE, POC UA: NORMAL
PH, POC UA: 5
PROTEIN, POC: NORMAL
SPECIFIC GRAVITY, POC UA: 1.01
UROBILINOGEN, POC UA: NORMAL

## 2018-10-16 PROCEDURE — 81001 URINALYSIS AUTO W/SCOPE: CPT | Mod: PBBFAC | Performed by: NURSE PRACTITIONER

## 2018-10-16 PROCEDURE — 81002 URINALYSIS NONAUTO W/O SCOPE: CPT | Mod: PBBFAC | Performed by: NURSE PRACTITIONER

## 2018-10-16 PROCEDURE — 99214 OFFICE O/P EST MOD 30 MIN: CPT | Mod: S$PBB,,, | Performed by: NURSE PRACTITIONER

## 2018-10-16 PROCEDURE — 51798 US URINE CAPACITY MEASURE: CPT | Mod: PBBFAC | Performed by: NURSE PRACTITIONER

## 2018-10-16 PROCEDURE — 99999 PR PBB SHADOW E&M-EST. PATIENT-LVL III: CPT | Mod: PBBFAC,,, | Performed by: NURSE PRACTITIONER

## 2018-10-16 PROCEDURE — 99213 OFFICE O/P EST LOW 20 MIN: CPT | Mod: PBBFAC | Performed by: NURSE PRACTITIONER

## 2018-10-16 RX ORDER — LIDOCAINE HYDROCHLORIDE 20 MG/ML
JELLY TOPICAL ONCE
Status: CANCELLED | OUTPATIENT
Start: 2018-10-16 | End: 2018-10-16

## 2018-10-16 RX ORDER — CIPROFLOXACIN 250 MG/1
500 TABLET, FILM COATED ORAL ONCE
Status: CANCELLED | OUTPATIENT
Start: 2018-10-16 | End: 2018-10-16

## 2018-10-16 RX ORDER — LIDOCAINE HYDROCHLORIDE 10 MG/ML
10 INJECTION INFILTRATION; PERINEURAL ONCE
Status: CANCELLED | OUTPATIENT
Start: 2018-10-16 | End: 2018-10-16

## 2018-10-16 NOTE — PATIENT INSTRUCTIONS
Cystoscopy    Cystoscopy is a procedure that lets your doctor look directly inside your urethra and bladder. It can be used to:  · Help diagnose a problem with your urethra, bladder, or kidneys.  · Take a sample (biopsy) of bladder or urethral tissue.  · Treat certain problems (such as removing kidney stones).  · Place a stent to bypass an obstruction.  · Take special X-rays of the kidneys.  Based on the findings, your doctor may recommend other tests or treatments.  What is a cystoscope?  A cystoscope is a telescope-like instrument that contains lenses and fiberoptics (small glass wires that make bright light). The cystoscope may be straight and rigid, or flexible to bend around curves in the urethra. The doctor may look directly into the cystoscope, or project the image onto a monitor.  Getting ready  · Ask your doctor if you should stop taking any medicines before the procedure.  · Ask whether you should avoid eating or drinking anything after midnight before the procedure.  · Follow any other instructions your doctor gives you.  Tell your doctor before the exam if you:  · Take any medicines, such as aspirin or blood thinners  · Have allergies to any medicines  · Are pregnant   The procedure  Cystoscopy is done in the doctors office, surgery center, or hospital. The doctor and a nurse are present during the procedure. It takes only a few minutes, longer if a biopsy, X-ray, or treatment needs to be done.  During the procedure:  · You lie on an exam table on your back, knees bent and legs apart. You are covered with a drape.  · Your urethra and the area around it are washed. Anesthetic jelly may be applied to numb the urethra. Other pain medicine is usually not needed. In some cases, you may be offered a mild sedative to help you relax. If a more extensive procedure is to be done, such as a biopsy or kidney stone removal, general anesthesia may be needed.  · The cystoscope is inserted. A sterile fluid is put  into the bladder to expand it. You may feel pressure from this fluid.  · When the procedure is done, the cystoscope is removed.  After the procedure  If you had a sedative, general anesthesia, or spinal anesthesia, you must have someone drive you home. Once youre home:  · Drink plenty of fluids.  · You may have burning or light bleeding when you urinate--this is normal.  · Medicines may be prescribed to ease any discomfort or prevent infection. Take these as directed.  · Call your doctor if you have heavy bleeding or blood clots, burning that lasts more than a day, a fever over 100°F  (38° C), or trouble urinating.  Date Last Reviewed: 1/1/2017  © 8908-2432 [employer], [employer]. [address] [place] [address], [place] [address]. All rights reserved. This information is not intended as a substitute for professional medical care. Always follow your healthcare professional's instructions.

## 2018-10-16 NOTE — PROGRESS NOTES
Subjective:       Patient ID: Caesar Arevalo is a 67 y.o. male.    Chief Complaint: Other (psa results)      HPI: Caesar Arevalo is a 67 y.o. Black or  male who presents today f/u PSA results and urinary frequency. His last clinic visit was 7/16/18.     He was seen by Dr. He at [facility]. Pt reports he had some type of prostate surgery by him but is unsure of when it was. After reviewing records in media, pt had a TURP in 2013. It improved his urinary symptoms but now they are worsening again.   Last PSA 10/3/17 0.6, records in media.    Today he reports he increased flomax to BID after last clinic visit for about 3 weeks with no change in his urinary symptoms. He stopped taking flomax completely and found his LUTS was the same without medication. He reports daytime frequency every 2-3 hours and nocturia x 2-3. FOS decreased and intermittent. He was on finasteride in the past and did not have relief of symptoms. He reports his LUTS is bothersome for him and is considering prostate surgery. Denies hesitancy, urgency, incontinence, dysuria, hematuria or flank pain. Denies f/c/n/v. He drinks coffee and water mostly and on occasion drinks diet soda. He has not modified his diet and drinks liquids until he goes to sleep at night. He is type 2 diabetic with A1C 7.4 .   He reports his cousin had prostate cancer but denies any personal history of prostate cancer.     10/11/18 PSA 0.73    Review of patient's allergies indicates:  No Known Allergies    Current Outpatient Medications   Medication Sig Dispense Refill    amLODIPine (NORVASC) 10 MG tablet TAKE 1 TABLET EVERY MORNING 90 tablet 1    aspirin (ECOTRIN) 81 MG EC tablet Take 81 mg by mouth once daily.      atorvastatin (LIPITOR) 20 MG tablet TAKE 1 TABLET EVERY MORNING 90 tablet 1    blood sugar diagnostic Strp 1 strip by Misc.(Non-Drug; Combo Route) route 3 (three) times daily. freestyle 300 strip 6    blood-glucose meter (FREESTYLE  SYSTEM KIT) kit Use as instructed 1 each 0    cholecalciferol, vitamin D3, (VITAMIN D3) 1,000 unit capsule Take 1,000 Units by mouth once daily.      clindamycin (CLEOCIN T) 1 % external solution Apply to scalp BID prn bumps 60 mL 1    diclofenac sodium 1 % Gel Apply 2 g topically once daily. 100 g 0    ketoconazole (NIZORAL) 2 % shampoo Wash hair with medicated shampoo at least 2x/week - let sit on scalp at least 5 minutes prior to rinsing 120 mL 5    lancets (FREESTYLE LANCETS) 28 gauge Misc 1 lancet by Other route 3 (three) times daily. 100 each 5    linagliptin (TRADJENTA) 5 mg Tab tablet Take 1 tablet (5 mg total) by mouth once daily. 90 tablet 1    lisinopril (PRINIVIL,ZESTRIL) 40 MG tablet Take 1 tablet (40 mg total) by mouth once daily. 90 tablet 1    loratadine (CLARITIN) 10 mg tablet TAKE 1 TABLET DAILY 90 tablet 1    metFORMIN (GLUCOPHAGE) 1000 MG tablet Take 1 tablet (1,000 mg total) by mouth 2 (two) times daily with meals. 180 tablet 1    multivit-min-folic-vit K-lycop (ONE-A-DAY MEN'S 50 PLUS) 400- mcg Tab Take by mouth.      mupirocin (BACTROBAN) 2 % ointment Apply topically 3 (three) times daily. 30 g 0    naftifine 2 % Gel Apply 1 application topically once daily. 60 g 2    omega-3 fatty acids (FISH OIL) 300 mg Cap Take 300 mg by mouth.       No current facility-administered medications for this visit.        Past Medical History:   Diagnosis Date    Bell's palsy     1980's    BPH (benign prostatic hyperplasia)     Diabetes mellitus, type 2     Hypertension     TB lung, latent 1980    s/p treatment       Past Surgical History:   Procedure Laterality Date    COLONOSCOPY N/A 7/25/2016    Procedure: COLONOSCOPY;  Surgeon: RICARDO Mclain MD;  [facility]  Service: Endoscopy;  Laterality: N/A;    COLONOSCOPY N/A 7/25/2016    Performed by RICARDO Mclain MD at [facility] (4TH FLR)    DISKECTOMY AND FUSION-ANTERIOR CERVICAL (ACDF) N/A 10/11/2016    [facility]    HERNIA REPAIR  04/2016    norris    KNEE LIGAMENT RECONSTRUCTION      plantar warts      PROSTATE SURGERY      REPAIR-HERNIA-UMBILICAL N/A 4/7/2016    Performed by Marc Agarwal MD at [facility] OR [address] [place] [address]       Family History   Problem Relation Age of Onset    Diabetes Mother     Hypertension Mother     Heart disease Mother     Diabetes Sister     Cancer Neg Hx     Stroke Neg Hx     Melanoma Neg Hx     Psoriasis Neg Hx     Lupus Neg Hx        Review of Systems   Constitutional: Negative for chills, diaphoresis and fever.   HENT: Negative for congestion.    Eyes: Negative for discharge.   Respiratory: Negative for chest tightness and shortness of breath.    Cardiovascular: Negative for chest pain and palpitations.   Gastrointestinal: Negative for abdominal pain, constipation, diarrhea, nausea and vomiting.   Genitourinary: Positive for frequency. Negative for decreased urine volume, difficulty urinating, discharge, dysuria, flank pain, hematuria, penile pain, penile swelling, scrotal swelling, testicular pain and urgency.   Musculoskeletal: Negative for gait problem.   Skin: Negative for rash.   Allergic/Immunologic: Negative for immunocompromised state.   Neurological: Negative for seizures, weakness and headaches.   Hematological: Negative for adenopathy.   Psychiatric/Behavioral: Negative for confusion. The patient is not nervous/anxious.          All other systems were reviewed and were negative.    Objective:     Vitals:    10/16/18 1047   BP: (!) 157/90   Pulse: 94        Physical Exam   Nursing note and vitals reviewed.  Constitutional: He is oriented to person, place, and time. He appears well-developed and well-nourished.  Non-toxic appearance. He does not have a sickly appearance. He does not appear ill. No distress.   HENT:   Head: Normocephalic and atraumatic.   Eyes: Conjunctivae and EOM are normal.   Neck: Normal range of motion.    Cardiovascular: Normal rate and regular rhythm.    Pulmonary/Chest: Effort normal. No respiratory distress.   Abdominal: Soft. He exhibits no distension.   Genitourinary:   Genitourinary Comments: Prostate exam 5/21/18   Musculoskeletal: Normal range of motion.   Neurological: He is alert and oriented to person, place, and time.   Skin: Skin is warm and dry.     Psychiatric: He has a normal mood and affect. His behavior is normal. Judgment and thought content normal.         Lab Results   Component Value Date    CREATININE 1.4 05/25/2018     Lab Results   Component Value Date    EGFRNONAA 52.4 (A) 05/25/2018     Lab Results   Component Value Date    ESTGFRAFRICA >60.0 05/25/2018     Urine dipstick in clinic: sp grav 1.015, pH 5, negative results    PVR: done in clinic with bladder scanner by Nurse Pancho was 72 ml.    Assessment:       1. BPH with obstruction/lower urinary tract symptoms        Plan:     Caesar was seen today for other.    Diagnoses and all orders for this visit:    BPH with obstruction/lower urinary tract symptoms  -     lidocaine HCl 2% urojet; Place into the urethra once.  -     ciprofloxacin HCl tablet 500 mg; Take 2 tablets (500 mg total) by mouth once.  -     Cystoscopy; Future  -     lidocaine HCL 2% jelly; Apply topically once.  -     lidocaine HCL 10 mg/ml (1%) injection 10 mL; 10 mLs by Infiltration route once.  -     lidocaine HCL 2% jelly; Apply topically once.  -     lidocaine HCL 10 mg/ml (1%) injection 10 mL; 10 mLs by Infiltration route once.  -     Transrectal Ultrasound; Future  -     POCT urinalysis, dipstick or tablet reag  -     POCT Bladder Scan    -Discussed plan of care with patient  -Education and recommendations of today's plan of care including home remedies.  Discussed the in office findings; reassurance with no infection and emptying his bladder.  Discussed the contributing factors for his LUTS.  Diet modifications: reducing PM liquids (no eating or drinking 2 hours  before bed), limit salt intake, no caffeine intake after 3 pm  Avoid Bladder Irritants: Tea, coffee, caffeine, alcohol, artificial sweeteners, citrus, spicy foods, acidic foods,chocolate, tomato-based foods, smoking  Elevating his feet couple of hours prior to bedtime. Void before bed.  -Discussed surgical intervention for prostate. Will proceed with cysto and TRUS with Dr. Johnson to further evaluate prostate prior to surgery.  -Discussed uncontrolled diabetes and affects on bladder. Continue to improve A1C and diabetes control.  -Follow up as scheduled with TRUS/cysto with Dr. Johnson  Will need PSA in 1 year       I spent 25 minutes with the patient of which more than half was spent in coordinating the patient's care as well as in direct consultation with the patient in regards to our treatment and plan.

## 2018-10-18 ENCOUNTER — TELEPHONE (OUTPATIENT)
Dept: UROLOGY | Facility: CLINIC | Age: 68
End: 2018-10-18

## 2018-10-18 NOTE — TELEPHONE ENCOUNTER
Left voicemail for patient that his TRUS/cysto was rescheduled for 11/19/18 with Dr. Johnson. He was originally scheduled by the nurse at a time Dr. Johnsno does not perform procedures and had to be rescheduled. Mailed appt to patient as well.

## 2018-11-08 ENCOUNTER — TELEPHONE (OUTPATIENT)
Dept: UROLOGY | Facility: CLINIC | Age: 68
End: 2018-11-08

## 2018-11-27 ENCOUNTER — HOSPITAL ENCOUNTER (OUTPATIENT)
Dept: UROLOGY | Facility: HOSPITAL | Age: 68
Discharge: HOME | End: 2018-11-27
Attending: UROLOGY
Payer: MEDICARE

## 2018-11-27 VITALS
HEART RATE: 102 BPM | HEIGHT: 71 IN | BODY MASS INDEX: 34.13 KG/M2 | RESPIRATION RATE: 16 BRPM | WEIGHT: 243.81 LBS | SYSTOLIC BLOOD PRESSURE: 157 MMHG | TEMPERATURE: 98 F | DIASTOLIC BLOOD PRESSURE: 91 MMHG

## 2018-11-27 DIAGNOSIS — N40.1 BPH WITH OBSTRUCTION/LOWER URINARY TRACT SYMPTOMS: ICD-10-CM

## 2018-11-27 DIAGNOSIS — N13.8 BPH WITH OBSTRUCTION/LOWER URINARY TRACT SYMPTOMS: ICD-10-CM

## 2018-11-27 PROCEDURE — 52000 CYSTOURETHROSCOPY: CPT

## 2018-11-27 RX ORDER — LIDOCAINE HYDROCHLORIDE 20 MG/ML
JELLY TOPICAL ONCE
Status: COMPLETED | OUTPATIENT
Start: 2018-11-27 | End: 2018-11-27

## 2018-11-27 RX ORDER — LIDOCAINE HYDROCHLORIDE 20 MG/ML
JELLY TOPICAL ONCE
Status: DISCONTINUED | OUTPATIENT
Start: 2018-11-27 | End: 2019-01-28

## 2018-11-27 RX ORDER — CIPROFLOXACIN 500 MG/1
500 TABLET ORAL ONCE
Status: COMPLETED | OUTPATIENT
Start: 2018-11-27 | End: 2018-11-27

## 2018-11-27 RX ADMIN — LIDOCAINE HYDROCHLORIDE: 20 JELLY TOPICAL at 01:11

## 2018-11-27 RX ADMIN — CIPROFLOXACIN 500 MG: 500 TABLET ORAL at 01:11

## 2018-11-27 NOTE — OP NOTE
DATE OF PROCEDURE:  11/27/2018    PREOPERATIVE DIAGNOSIS:  BPH with outlet obstruction.    POSTOPERATIVE DIAGNOSIS:  BPH with outlet obstruction.    OPERATION PERFORMED:  Flexible cystoscopy.    PROCEDURE IN DETAIL:  The patient was prepped and draped in supine position.  He   had lower urinary tract symptoms with irritative symptoms such as frequency and   urgency.  Xylocaine jelly was instilled per urethra.  The anterior urethra was   normal except for some mild narrowed areas consistent with mild urethral   strictures.  Prostate was status post TURP years ago.  There was regrowth of   tissue causing a significant amount of obstruction.  There was no evidence of an   enlarged median lobe.  There were grade 2 trabeculations.  Both ureteral   orifices were normal size, shape and position with clear efflux.    IMPRESSION:  BPH with outlet obstruction, status post TURP with significant   regrowth of tissue.    RECOMMENDATIONS:  Laser TURP.  The patient understands the side effects of this   and the fact that his irritative symptoms may not improve, but any obstructive   symptoms should improve.  The prostate ultrasound was canceled.      ROSEANN  dd: 11/27/2018 13:44:12 (CST)  td: 11/27/2018 14:14:01 (CST)  Doc ID   #[id number]  Job ID #[id number]    CC:

## 2018-11-27 NOTE — PATIENT INSTRUCTIONS
Cystoscopy    Cystoscopy is a procedure that lets your doctor look directly inside your urethra and bladder. It can be used to:  · Help diagnose a problem with your urethra, bladder, or kidneys.  · Take a sample (biopsy) of bladder or urethral tissue.  · Treat certain problems (such as removing kidney stones).  · Place a stent to bypass an obstruction.  · Take special X-rays of the kidneys.  Based on the findings, your doctor may recommend other tests or treatments.  What is a cystoscope?  A cystoscope is a telescope-like instrument that contains lenses and fiberoptics (small glass wires that make bright light). The cystoscope may be straight and rigid, or flexible to bend around curves in the urethra. The doctor may look directly into the cystoscope, or project the image onto a monitor.  Getting ready  · Ask your doctor if you should stop taking any medicines before the procedure.  · Ask whether you should avoid eating or drinking anything after midnight before the procedure.  · Follow any other instructions your doctor gives you.  Tell your doctor before the exam if you:  · Take any medicines, such as aspirin or blood thinners  · Have allergies to any medicines  · Are pregnant   The procedure  Cystoscopy is done in the doctors office, surgery center, or hospital. The doctor and a nurse are present during the procedure. It takes only a few minutes, longer if a biopsy, X-ray, or treatment needs to be done.  During the procedure:  · You lie on an exam table on your back, knees bent and legs apart. You are covered with a drape.  · Your urethra and the area around it are washed. Anesthetic jelly may be applied to numb the urethra. Other pain medicine is usually not needed. In some cases, you may be offered a mild sedative to help you relax. If a more extensive procedure is to be done, such as a biopsy or kidney stone removal, general anesthesia may be needed.  · The cystoscope is inserted. A sterile fluid is put  into the bladder to expand it. You may feel pressure from this fluid.  · When the procedure is done, the cystoscope is removed.  After the procedure  If you had a sedative, general anesthesia, or spinal anesthesia, you must have someone drive you home. Once youre home:  · Drink plenty of fluids.  · You may have burning or light bleeding when you urinate--this is normal.  · Medicines may be prescribed to ease any discomfort or prevent infection. Take these as directed.  · Call your doctor if you have heavy bleeding or blood clots, burning that lasts more than a day, a fever over 100°F  (38° C), or trouble urinating.  Date Last Reviewed: 1/1/2017  © 7680-3383 [employer], [employer] [address] [place] [address], [place] [address]. All rights reserved. This information is not intended as a substitute for professional medical care. Always follow your healthcare professional's instructions.

## 2018-11-28 ENCOUNTER — TELEPHONE (OUTPATIENT)
Dept: UROLOGY | Facility: CLINIC | Age: 68
End: 2018-11-28

## 2018-11-28 DIAGNOSIS — N40.1 BPH WITH OBSTRUCTION/LOWER URINARY TRACT SYMPTOMS: Primary | ICD-10-CM

## 2018-11-28 DIAGNOSIS — N13.8 BPH WITH OBSTRUCTION/LOWER URINARY TRACT SYMPTOMS: Primary | ICD-10-CM

## 2018-12-14 ENCOUNTER — ANESTHESIA EVENT (OUTPATIENT)
Dept: SURGERY | Facility: HOSPITAL | Age: 68
End: 2018-12-14
Payer: MEDICARE

## 2018-12-14 DIAGNOSIS — Z01.818 PREOPERATIVE TESTING: Primary | ICD-10-CM

## 2018-12-14 NOTE — PRE ADMISSION SCREENING
Anesthesia Assessment: Preoperative EQUATION    Planned Procedure: Procedure(s) (LRB):  TURP, USING LASER (N/A)  Requested Anesthesia Type:General  Surgeon: Herman Johnson Jr., MD  Service: Urology  Known or anticipated Date of Surgery:12/28/2018    Surgeon notes: reviewed    Electronic QUestionnaire Assessment completed via nurse interview with patient.        No AQ      Triage considerations:     The patient has no apparent active cardiac condition (No unstable coronary Syndrome such as severe unstable angina or recent [<1 month] myocardial infarction, decompensated CHF, severe valvular   disease or significant arrhythmia)    Previous anesthesia records:GETA and MAC   10/11/16 diskectomy:  Airway/Jaw/Neck:  Airway Findings: Mouth Opening: Normal Tongue: Normal  General Airway Assessment: Adult  Mallampati: III  Improves to III with phonation.  TM Distance: Normal, at least 6 cm  Jaw/Neck Findings:  Neck ROM: Normal ROM         04/07/16; Placement Time: 0940; Method of Intubation: Direct laryngoscopy, Bougie Intubation; Inserted by: CRNA; Airway Device: Endotracheal Tube; Mask Ventilation: Easy; Intubated: Postinduction; Blade: Levin #2; Airway Device Size: 8.0; Style: Cuffed; Cuff Inflation: Minimal occlusive pressure; Inflation Amount: 8; Placement Verified By: Auscultation, Capnometry; Grade: Grade II; Complicating Factors: Anterior larynx, Lg prominent central incisors; Intubation Findings: Positive EtCO2, Bilateral breath sounds, Atraumatic/Condition of teeth unchanged;  Depth of Insertion: 22; Securment: Lips; Complications: None; Breath Sounds: Equal Bilateral; Insertion Attempts: 2        [facility]   Subspecialty notes: n/a    Other important co-morbidities: HTN/HLP, DM2, obesity     Tests already available:  Available tests,  3-6 months ago . 9/14/18 A1c. 5/25/18 CBC, CMP. 2017 echo. 2016 EKG.            Instructions given. (See in Nurse's note)    Optimization:   Anesthesia Preop Clinic Assessment  Indicated-not required for this procedure        Plan:    Testing:  Hematology Profile and BMP        Patient  has previously scheduled Medical Appointment: 12/18 preop w/surgeon    Navigation: Tests Scheduled. 12/18                     Results will be tracked by Preop Clinic.

## 2018-12-14 NOTE — ANESTHESIA PREPROCEDURE EVALUATION
Past Medical History:   Diagnosis Date    Bell's palsy     1980's    BPH (benign prostatic hyperplasia)     Diabetes mellitus, type 2     Hypertension     TB lung, latent 1980    s/p treatment     Past Surgical History:   Procedure Laterality Date    COLONOSCOPY N/A 7/25/2016    Performed by RICARDO Mclain MD at [facility] ENDO (4TH FLR)    DISKECTOMY AND FUSION-ANTERIOR CERVICAL (ACDF) N/A 10/11/2016    Performed by Serena Zelaya MD at [facility] OR 2ND FLR    HERNIA REPAIR  04/2016    norris    KNEE LIGAMENT RECONSTRUCTION      plantar warts      PROSTATE SURGERY      REPAIR-HERNIA-UMBILICAL N/A 4/7/2016    Performed by Marc Agarwal MD at [facility] OR 2ND FLR     Patient Active Problem List   Diagnosis    Type 2 diabetes mellitus with diabetic neuropathy, without long-term current use of insulin    Essential hypertension    Screening    Chronic low back pain    Cervical spondylosis    Cervical radiculopathy    Spondylosis of lumbar region without myelopathy or radiculopathy    Type 2 diabetes mellitus without retinopathy    Cervical stenosis of spinal canal    RBC microcytosis    Renal impairment    Non morbid obesity    Edema    Left arm weakness    Abnormal posture    Lumbar facet arthropathy    TB lung, latent    Former smoker    TERRY (dyspnea on exertion)    Colon polyp    RODERICK (obstructive sleep apnea)    Benign prostatic hyperplasia with urinary frequency    Right elbow pain    Elbow arthritis    BPH with obstruction/lower urinary tract symptoms     Body mass index is 33.47 kg/m².  2D Echo:  Results for orders placed or performed during the hospital encounter of 06/13/17   2D echo with color flow doppler   Result Value Ref Range    QEF 60 55 - 65    Diastolic Dysfunction No     Est. PA Systolic Pressure 14.44     Mitral Valve Mobility NORMAL        Please See ROS/PMH and Active Problem List above       Paige Zaragoza RN   Registered Nurse      Pre Admission Screening   Signed                              []Hide copied text    []Alexver for details      Anesthesia Assessment: Preoperative EQUATION     Planned Procedure: Procedure(s) (LRB):  TURP, USING LASER (N/A)  Requested Anesthesia Type:General  Surgeon: Herman Johnson Jr., MD  Service: Urology  Known or anticipated Date of Surgery:12/28/2018     Surgeon notes: reviewed     Electronic QUestionnaire Assessment completed via nurse interview with patient.         No AQ        Triage considerations:      The patient has no apparent active cardiac condition (No unstable coronary Syndrome such as severe unstable angina or recent [<1 month] myocardial infarction, decompensated CHF, severe valvular   disease or significant arrhythmia)     Previous anesthesia records:GETA and MAC   10/11/16 diskectomy:  Airway/Jaw/Neck:  Airway Findings: Mouth Opening: Normal Tongue: Normal  General Airway Assessment: Adult  Mallampati: III  Improves to III with phonation.  TM Distance: Normal, at least 6 cm  Jaw/Neck Findings:  Neck ROM: Normal ROM        04/07/16; Placement Time: 0940; Method of Intubation: Direct laryngoscopy, Bougie Intubation; Inserted by: CRNA; Airway Device: Endotracheal Tube; Mask Ventilation: Easy; Intubated: Postinduction; Blade: Levin #2; Airway Device Size: 8.0; Style: Cuffed; Cuff Inflation: Minimal occlusive pressure; Inflation Amount: 8; Placement Verified By: Auscultation, Capnometry; Grade: Grade II; Complicating Factors: Anterior larynx, Lg prominent central incisors; Intubation Findings: Positive EtCO2, Bilateral breath sounds, Atraumatic/Condition of teeth unchanged;  Depth of Insertion: 22; Securment: Lips; Complications: None; Breath Sounds: Equal Bilateral; Insertion Attempts: 2           Last PCP note: 3-6 months ago , within [facility]   Subspecialty notes: n/a     Other important co-morbidities: HTN/HLP, DM2, obesity     Tests already available:  Available tests,  3-6 months ago . 9/14/18 A1c. 5/25/18 CBC, CMP. 2017 echo.  2016 EKG.                            Instructions given. (See in Nurse's note)     Optimization:  Anesthesia Preop Clinic Assessment  Indicated-not required for this procedure                Plan:    Testing:  Hematology Profile and BMP                              Patient  has previously scheduled Medical Appointment: 12/18 preop w/surgeon     Navigation: Tests Scheduled. 12/18                                    Results will be tracked by Preop Clinic.                                   Electronically signed by Paige Zaragoza RN at 12/14/2018  1:46 PM       Pre-admit on 12/28/2018            Detailed Report      12/19/18 lab results noted                                                                                                                12/14/2018  Caesar Arevalo is a 68 y.o., male.    Anesthesia Evaluation    I have reviewed the Patient Summary Reports.    I have reviewed the Nursing Notes.   I have reviewed the Medications.     Review of Systems  Anesthesia Hx:  Hx of Anesthetic complications  History of prior surgery of interest to airway management or planning: cervical fusion. Previous anesthesia: General 2016 cervical fusion with general anesthesia.  Procedure performed at an [facility] Facility. Denies Family Hx of Anesthesia complications.  Personal Hx of Anesthesia complications  Difficult Intubation (Bougie used d/t anterior larynx- in 4/2016 anesthesia note), other special techniques / equipment used, documented in Epic anesthesia history   Social:  Former Smoker  Tobacco Use:  of cigarette   Hematology/Oncology:  Hematology Normal   Oncology Normal     EENT/Dental:EENT/Dental Normal   Cardiovascular:   Exercise tolerance: good Hypertension  Denies Angina. hyperlipidemia  Functional Capacity 4 METS  Hypertension , Fairly Controlled on RX    Pulmonary:   Denies Shortness of breath.  Denies Recent URI. Sleep Apnea    Renal/:   Chronic Renal Disease BPH  Kidney Function/Disease  Other Renal / Gu  Conditions: Benign Prostatic Hypertrophy (BPH)  Hepatic/GI:  Hepatic/GI Normal    Musculoskeletal:   Arthritis   Cervical Spine Disorder, Cervical Disc Disease, S/P Cervical Fusion Denies limited neck motion   Neurological:   Neuromuscular Disease,   Peripheral Neuropathy    Endocrine:   Diabetes  Diabetes, Type 2 Diabetes , controlled by oral hypoglycemics. Typical AM glucose range: <160 , most recent HgA1c value was 7.4 on 9/14/18.  Metabolic Disorders, Obesity / BMI > 30  Psych:  Psychiatric Normal           Physical Exam  General:  Obesity    Airway/Jaw/Neck:  Airway Findings: Mouth Opening: Normal Tongue: Normal  General Airway Assessment: Adult  Mallampati: III  Improves to III with phonation.  TM Distance: Normal, at least 6 cm  Jaw/Neck Findings:  Neck ROM: Normal ROM       Chest/Lungs:  Chest/Lungs Findings: Clear to auscultation, Normal Respiratory Rate     Heart/Vascular:  Heart Findings: Rate: Normal  Rhythm: Regular Rhythm        Mental Status:  Mental Status Findings:  Alert and Oriented         Anesthesia Plan  Type of Anesthesia, risks & benefits discussed:  Anesthesia Type:  general  Patient's Preference: General   Intra-op Monitoring Plan: standard ASA monitors  Intra-op Monitoring Plan Comments:   Post Op Pain Control Plan: per primary service following discharge from PACU  Post Op Pain Control Plan Comments:   Induction:   IV  Beta Blocker:  Patient is not currently on a Beta-Blocker (No further documentation required).       Informed Consent: Patient understands risks and agrees with Anesthesia plan.  Questions answered. Anesthesia consent signed with patient.  ASA Score: 3     Day of Surgery Review of History & Physical:    H&P update referred to the surgeon.     Anesthesia Plan Notes: NPO confirmed.   No history of anesthesia problems.   Have video scope available          Ready For Surgery From Anesthesia Perspective.

## 2018-12-18 ENCOUNTER — OFFICE VISIT (OUTPATIENT)
Dept: UROLOGY | Facility: CLINIC | Age: 68
End: 2018-12-18
Payer: MEDICARE

## 2018-12-18 ENCOUNTER — LAB VISIT (OUTPATIENT)
Dept: LAB | Facility: HOSPITAL | Age: 68
End: 2018-12-18
Attending: ANESTHESIOLOGY
Payer: MEDICARE

## 2018-12-18 DIAGNOSIS — N40.1 BENIGN PROSTATIC HYPERPLASIA WITH URINARY FREQUENCY: Primary | ICD-10-CM

## 2018-12-18 DIAGNOSIS — Z01.818 PREOPERATIVE TESTING: ICD-10-CM

## 2018-12-18 DIAGNOSIS — J30.9 ALLERGIC RHINITIS: ICD-10-CM

## 2018-12-18 DIAGNOSIS — R35.0 BENIGN PROSTATIC HYPERPLASIA WITH URINARY FREQUENCY: Primary | ICD-10-CM

## 2018-12-18 LAB
ANION GAP SERPL CALC-SCNC: 9 MMOL/L
BUN SERPL-MCNC: 15 MG/DL
CALCIUM SERPL-MCNC: 10.1 MG/DL
CHLORIDE SERPL-SCNC: 101 MMOL/L
CO2 SERPL-SCNC: 28 MMOL/L
CREAT SERPL-MCNC: 1.5 MG/DL
ERYTHROCYTE [DISTWIDTH] IN BLOOD BY AUTOMATED COUNT: 14 %
EST. GFR  (AFRICAN AMERICAN): 55.3 ML/MIN/1.73 M^2
EST. GFR  (NON AFRICAN AMERICAN): 47.8 ML/MIN/1.73 M^2
GLUCOSE SERPL-MCNC: 169 MG/DL
HCT VFR BLD AUTO: 42.6 %
HGB BLD-MCNC: 15 G/DL
MCH RBC QN AUTO: 27.1 PG
MCHC RBC AUTO-ENTMCNC: 35.2 G/DL
MCV RBC AUTO: 77 FL
PLATELET # BLD AUTO: 345 K/UL
PMV BLD AUTO: 10.7 FL
POTASSIUM SERPL-SCNC: 4.2 MMOL/L
RBC # BLD AUTO: 5.54 M/UL
SODIUM SERPL-SCNC: 138 MMOL/L
WBC # BLD AUTO: 8.06 K/UL

## 2018-12-18 PROCEDURE — 99499 UNLISTED E&M SERVICE: CPT | Mod: S$PBB,,, | Performed by: UROLOGY

## 2018-12-18 PROCEDURE — 99999 PR PBB SHADOW E&M-EST. PATIENT-LVL III: CPT | Mod: PBBFAC,,,

## 2018-12-18 PROCEDURE — 80048 BASIC METABOLIC PNL TOTAL CA: CPT

## 2018-12-18 PROCEDURE — 99213 OFFICE O/P EST LOW 20 MIN: CPT | Mod: PBBFAC

## 2018-12-18 PROCEDURE — 85027 COMPLETE CBC AUTOMATED: CPT

## 2018-12-18 PROCEDURE — 36415 COLL VENOUS BLD VENIPUNCTURE: CPT

## 2018-12-18 RX ORDER — LIDOCAINE HYDROCHLORIDE 10 MG/ML
1 INJECTION, SOLUTION EPIDURAL; INFILTRATION; INTRACAUDAL; PERINEURAL ONCE
Status: CANCELLED | OUTPATIENT
Start: 2018-12-18 | End: 2018-12-18

## 2018-12-18 RX ORDER — LORATADINE 10 MG/1
TABLET ORAL
Qty: 90 TABLET | Refills: 1 | Status: SHIPPED | OUTPATIENT
Start: 2018-12-18 | End: 2019-07-17 | Stop reason: SDUPTHER

## 2018-12-18 NOTE — PROGRESS NOTES
Urology ([facility]) H&P  Staff: Herman Johnson MD    CC: BPH with LUTS    HPI:   Caesar Arevalo is a 68 y.o. male with a history of HTN, Type 2 DM, and BPH with LUTS.    Patient had TURP in 2013 at [facility] and is having worsening of urinary symptoms.    He has taken flomax in the past which did not improve his symptoms. He is currently not taking medications for his LUTS.    Patient endorses frequency, nocturia x4, incomplete emptying, double voiding, intermittency, hesitancy. Patient does not endorse straining or urgency.    Patient denies history of UTI's.    Patient underwent cystoscopy on 11/27/18 which showed regrowth of previously resected tissue from previous TURP.    Last PSA 10/11/2018 was 0.73. Positive family history of prostate cancer in maternal uncle. No personal history of malignancy.      ROS:  Patient fevers, chills, chest pain, SOB, abdominal pain, suprapubic pain, flank pain, dysuria, gross hematuria. All other systems neg except per HPI    Past Medical History:   Diagnosis Date    Bell's palsy     1980's    BPH (benign prostatic hyperplasia)     Diabetes mellitus, type 2     Hypertension     TB lung, latent 1980    s/p treatment       Past Surgical History:   Procedure Laterality Date    COLONOSCOPY N/A 7/25/2016    Procedure: COLONOSCOPY;  Surgeon: RICARDO Mclain MD;  Location: [facility] (4TH FLR);  Service: Endoscopy;  Laterality: N/A;    COLONOSCOPY N/A 7/25/2016    Performed by RICARDO Mclain MD at [facility] ENDO (4TH FLR)    DISKECTOMY AND FUSION-ANTERIOR CERVICAL (ACDF) N/A 10/11/2016    Performed by Serena Zelaya MD at [facility] OR 2ND FLR    HERNIA REPAIR  04/2016    norris    KNEE LIGAMENT RECONSTRUCTION      plantar warts      PROSTATE SURGERY      REPAIR-HERNIA-UMBILICAL N/A 4/7/2016    Performed by Marc Agarwal MD at [facility] OR 2ND FLR       Social History     Socioeconomic History    Marital status:      Spouse name: None    Number of children: 4    Years of  education: None    Highest education level: None   Social Needs    Financial resource strain: None    Food insecurity - worry: None    Food insecurity - inability: None    Transportation needs - medical: None    Transportation needs - non-medical: None   Occupational History    Occupation: retired marine [employer]   Tobacco Use    Smoking status: Former Smoker     Packs/day: 1.00     Years: 30.00     Pack years: 30.00     Last attempt to quit: 2011     Years since quittin.2    Smokeless tobacco: Never Used   Substance and Sexual Activity    Alcohol use: Yes     Alcohol/week: 0.0 oz     Comment: occasionally-     Drug use: No    Sexual activity: Yes     Partners: Female   Other Topics Concern    None   Social History Narrative    None       Family History   Problem Relation Age of Onset    Diabetes Mother     Hypertension Mother     Heart disease Mother     Diabetes Sister     Cancer Neg Hx     Stroke Neg Hx     Melanoma Neg Hx     Psoriasis Neg Hx     Lupus Neg Hx        Review of patient's allergies indicates:  No Known Allergies    Current Outpatient Medications on File Prior to Visit   Medication Sig Dispense Refill    amLODIPine (NORVASC) 10 MG tablet TAKE 1 TABLET EVERY MORNING 90 tablet 1    aspirin (ECOTRIN) 81 MG EC tablet Take 81 mg by mouth once daily.      atorvastatin (LIPITOR) 20 MG tablet TAKE 1 TABLET EVERY MORNING 90 tablet 1    blood sugar diagnostic Strp 1 strip by Misc.(Non-Drug; Combo Route) route 3 (three) times daily. freestyle 300 strip 6    blood-glucose meter (FREESTYLE SYSTEM KIT) kit Use as instructed 1 each 0    cholecalciferol, vitamin D3, (VITAMIN D3) 1,000 unit capsule Take 1,000 Units by mouth once daily.      clindamycin (CLEOCIN T) 1 % external solution Apply to scalp BID prn bumps 60 mL 1    diclofenac sodium 1 % Gel Apply 2 g topically once daily. 100 g 0    ketoconazole (NIZORAL) 2 % shampoo Wash hair with medicated shampoo at least 2x/week -  "let sit on scalp at least 5 minutes prior to rinsing 120 mL 5    lancets (FREESTYLE LANCETS) 28 gauge Misc 1 lancet by Other route 3 (three) times daily. 100 each 5    linagliptin (TRADJENTA) 5 mg Tab tablet Take 1 tablet (5 mg total) by mouth once daily. 90 tablet 1    lisinopril (PRINIVIL,ZESTRIL) 40 MG tablet Take 1 tablet (40 mg total) by mouth once daily. 90 tablet 1    metFORMIN (GLUCOPHAGE) 1000 MG tablet Take 1 tablet (1,000 mg total) by mouth 2 (two) times daily with meals. 180 tablet 1    multivit-min-folic-vit K-lycop (ONE-A-DAY MEN'S 50 PLUS) 400- mcg Tab Take by mouth.      mupirocin (BACTROBAN) 2 % ointment Apply topically 3 (three) times daily. 30 g 0    naftifine 2 % Gel Apply 1 application topically once daily. 60 g 2    omega-3 fatty acids (FISH OIL) 300 mg Cap Take 300 mg by mouth.      [DISCONTINUED] loratadine (CLARITIN) 10 mg tablet TAKE 1 TABLET DAILY 90 tablet 1     Current Facility-Administered Medications on File Prior to Visit   Medication Dose Route Frequency Provider Last Rate Last Dose    lidocaine HCL 2% jelly   Topical (Top) Once Mary Onofre NP        lidocaine HCL 2% jelly   Topical (Top) Once Mary Onofre NP        lidocaine HCL 2% jelly   Topical (Top) Once Herman Johnson Jr., MD           Anticoagulation:  Yes - Aspirin 81 mg for prevention    Physical Exam:  Estimated body mass index is 34.01 kg/m² as calculated from the following:    Height as of 11/27/18: 5' 11" (1.803 m).    Weight as of 11/27/18: 110.6 kg (243 lb 13.3 oz).    General: No acute distress, well developed. AAOx3  Head: Normocephalic, Atraumatic  Eyes: Extra-occular movements intact, No discharge  Neck: supple, symmetrical, trachea midline  Lungs: normal respiratory effort, no respiratory distress, no wheezes  CV: regular rate, 2+ pulses  Abdomen: soft, non-tender, non-distended, no organomegaly  : Penis uncircumcised, No phimosis or paraphimosis present,  No testicular nodules " or tenderness palpated, No epididymal masses or tenderness palpated, Prostate 60 g, no induration or nodules  MSK: no edema, no deformities, normal ROM  Skin: skin color, texture, turgor normal.  Neurologic: no focal deficits, sensation intact    Labs:    Urine dipstick today - Negative for all tested components    Lab Results   Component Value Date    WBC 6.94 05/25/2018    HGB 14.9 05/25/2018    HCT 42.9 05/25/2018    MCV 75 (L) 05/25/2018     05/25/2018       BMP  Lab Results   Component Value Date     05/25/2018    K 4.0 05/25/2018     05/25/2018    CO2 26 05/25/2018    BUN 13 05/25/2018    CREATININE 1.4 05/25/2018    CALCIUM 9.2 05/25/2018    ANIONGAP 10 05/25/2018    ESTGFRAFRICA >60.0 05/25/2018    EGFRNONAA 52.4 (A) 05/25/2018       Lab Results   Component Value Date    PSADIAG 0.73 10/11/2018       Assessment: Caesar Arevalo is a 68 y.o. male with a history of HTN, Type 2 DM, and BPH with LUTS.    Plan:     1. To OR on 12/28/18 for laser TURP.  2. Consents signed for surgery.  3. I have explained the indication, risks, benefits, and alternatives of the procedure in detail.  The patient voices understanding and all questions have been answered.  The patient agrees to proceed as planned with laser TURP.  Risks including but not limited to bleeding infection, injury to urethra, bladder, ureters, and rectum, possible bladder neck contracture, clot retention, need for additional procedure, erectile dysfunction, retrograde ejaculation, and urinary incontinence.  4. Instructed patient to hold Aspirin for 5 days prior to surgery.  5. F/u CBC and BMP ordered by anesthesia    Madhu Rene MD

## 2018-12-18 NOTE — H&P (VIEW-ONLY)
Urology ([facility]) H&P  Staff: Herman Johnson MD    CC: BPH with LUTS    HPI:   Caesar Arevalo is a 68 y.o. male with a history of HTN, Type 2 DM, and BPH with LUTS.    Patient had TURP in 2013 at [facility] and is having worsening of urinary symptoms.    He has taken flomax in the past which did not improve his symptoms. He is currently not taking medications for his LUTS.    Patient endorses frequency, nocturia x4, incomplete emptying, double voiding, intermittency, hesitancy. Patient does not endorse straining or urgency.    Patient denies history of UTI's.    Patient underwent cystoscopy on 11/27/18 which showed regrowth of previously resected tissue from previous TURP.    Last PSA 10/11/2018 was 0.73. Positive family history of prostate cancer in maternal uncle. No personal history of malignancy.      ROS:  Patient fevers, chills, chest pain, SOB, abdominal pain, suprapubic pain, flank pain, dysuria, gross hematuria. All other systems neg except per HPI    Past Medical History:   Diagnosis Date    Bell's palsy     1980's    BPH (benign prostatic hyperplasia)     Diabetes mellitus, type 2     Hypertension     TB lung, latent 1980    s/p treatment       Past Surgical History:   Procedure Laterality Date    COLONOSCOPY N/A 7/25/2016    Procedure: COLONOSCOPY;  Surgeon: RICARDO Mclain MD;  Location: [facility] (4TH FLR);  Service: Endoscopy;  Laterality: N/A;    COLONOSCOPY N/A 7/25/2016    Performed by RICARDO Mclain MD at [facility] ENDO (4TH FLR)    DISKECTOMY AND FUSION-ANTERIOR CERVICAL (ACDF) N/A 10/11/2016    Performed by Serena Zelaya MD at [facility] OR 2ND FLR    HERNIA REPAIR  04/2016    norris    KNEE LIGAMENT RECONSTRUCTION      plantar warts      PROSTATE SURGERY      REPAIR-HERNIA-UMBILICAL N/A 4/7/2016    Performed by Marc Agarwal MD at [facility] OR 2ND FLR       Social History     Socioeconomic History    Marital status:      Spouse name: None    Number of children: 4    Years of  education: None    Highest education level: None   Social Needs    Financial resource strain: None    Food insecurity - worry: None    Food insecurity - inability: None    Transportation needs - medical: None    Transportation needs - non-medical: None   Occupational History    Occupation: retired [employer]   Tobacco Use    Smoking status: Former Smoker     Packs/day: 1.00     Years: 30.00     Pack years: 30.00     Last attempt to quit: 2011     Years since quittin.2    Smokeless tobacco: Never Used   Substance and Sexual Activity    Alcohol use: Yes     Alcohol/week: 0.0 oz     Comment: occasionally-     Drug use: No    Sexual activity: Yes     Partners: Female   Other Topics Concern    None   Social History Narrative    None       Family History   Problem Relation Age of Onset    Diabetes Mother     Hypertension Mother     Heart disease Mother     Diabetes Sister     Cancer Neg Hx     Stroke Neg Hx     Melanoma Neg Hx     Psoriasis Neg Hx     Lupus Neg Hx        Review of patient's allergies indicates:  No Known Allergies    Current Outpatient Medications on File Prior to Visit   Medication Sig Dispense Refill    amLODIPine (NORVASC) 10 MG tablet TAKE 1 TABLET EVERY MORNING 90 tablet 1    aspirin (ECOTRIN) 81 MG EC tablet Take 81 mg by mouth once daily.      atorvastatin (LIPITOR) 20 MG tablet TAKE 1 TABLET EVERY MORNING 90 tablet 1    blood sugar diagnostic Strp 1 strip by Misc.(Non-Drug; Combo Route) route 3 (three) times daily. freestyle 300 strip 6    blood-glucose meter (FREESTYLE SYSTEM KIT) kit Use as instructed 1 each 0    cholecalciferol, vitamin D3, (VITAMIN D3) 1,000 unit capsule Take 1,000 Units by mouth once daily.      clindamycin (CLEOCIN T) 1 % external solution Apply to scalp BID prn bumps 60 mL 1    diclofenac sodium 1 % Gel Apply 2 g topically once daily. 100 g 0    ketoconazole (NIZORAL) 2 % shampoo Wash hair with medicated shampoo at least 2x/week -  "let sit on scalp at least 5 minutes prior to rinsing 120 mL 5    lancets (FREESTYLE LANCETS) 28 gauge Misc 1 lancet by Other route 3 (three) times daily. 100 each 5    linagliptin (TRADJENTA) 5 mg Tab tablet Take 1 tablet (5 mg total) by mouth once daily. 90 tablet 1    lisinopril (PRINIVIL,ZESTRIL) 40 MG tablet Take 1 tablet (40 mg total) by mouth once daily. 90 tablet 1    metFORMIN (GLUCOPHAGE) 1000 MG tablet Take 1 tablet (1,000 mg total) by mouth 2 (two) times daily with meals. 180 tablet 1    multivit-min-folic-vit K-lycop (ONE-A-DAY MEN'S 50 PLUS) 400- mcg Tab Take by mouth.      mupirocin (BACTROBAN) 2 % ointment Apply topically 3 (three) times daily. 30 g 0    naftifine 2 % Gel Apply 1 application topically once daily. 60 g 2    omega-3 fatty acids (FISH OIL) 300 mg Cap Take 300 mg by mouth.      [DISCONTINUED] loratadine (CLARITIN) 10 mg tablet TAKE 1 TABLET DAILY 90 tablet 1     Current Facility-Administered Medications on File Prior to Visit   Medication Dose Route Frequency Provider Last Rate Last Dose    lidocaine HCL 2% jelly   Topical (Top) Once Mary Onofre NP        lidocaine HCL 2% jelly   Topical (Top) Once Mary Onofre NP        lidocaine HCL 2% jelly   Topical (Top) Once Herman Johnson Jr., MD           Anticoagulation:  Yes - Aspirin 81 mg for prevention    Physical Exam:  Estimated body mass index is 34.01 kg/m² as calculated from the following:    Height as of 11/27/18: 5' 11" (1.803 m).    Weight as of 11/27/18: 110.6 kg (243 lb 13.3 oz).    General: No acute distress, well developed. AAOx3  Head: Normocephalic, Atraumatic  Eyes: Extra-occular movements intact, No discharge  Neck: supple, symmetrical, trachea midline  Lungs: normal respiratory effort, no respiratory distress, no wheezes  CV: regular rate, 2+ pulses  Abdomen: soft, non-tender, non-distended, no organomegaly  : Penis uncircumcised, No phimosis or paraphimosis present,  No testicular nodules " or tenderness palpated, No epididymal masses or tenderness palpated, Prostate 60 g, no induration or nodules  MSK: no edema, no deformities, normal ROM  Skin: skin color, texture, turgor normal.  Neurologic: no focal deficits, sensation intact    Labs:    Urine dipstick today - Negative for all tested components    Lab Results   Component Value Date    WBC 6.94 05/25/2018    HGB 14.9 05/25/2018    HCT 42.9 05/25/2018    MCV 75 (L) 05/25/2018     05/25/2018       BMP  Lab Results   Component Value Date     05/25/2018    K 4.0 05/25/2018     05/25/2018    CO2 26 05/25/2018    BUN 13 05/25/2018    CREATININE 1.4 05/25/2018    CALCIUM 9.2 05/25/2018    ANIONGAP 10 05/25/2018    ESTGFRAFRICA >60.0 05/25/2018    EGFRNONAA 52.4 (A) 05/25/2018       Lab Results   Component Value Date    PSADIAG 0.73 10/11/2018       Assessment: Caesar Arevalo is a 68 y.o. male with a history of HTN, Type 2 DM, and BPH with LUTS.    Plan:     1. To OR on 12/28/18 for laser TURP.  2. Consents signed for surgery.  3. I have explained the indication, risks, benefits, and alternatives of the procedure in detail.  The patient voices understanding and all questions have been answered.  The patient agrees to proceed as planned with laser TURP.  Risks including but not limited to bleeding infection, injury to urethra, bladder, ureters, and rectum, possible bladder neck contracture, clot retention, need for additional procedure, erectile dysfunction, retrograde ejaculation, and urinary incontinence.  4. Instructed patient to hold Aspirin for 5 days prior to surgery.  5. F/u CBC and BMP ordered by anesthesia    Madhu Rene MD

## 2018-12-27 ENCOUNTER — TELEPHONE (OUTPATIENT)
Dept: UROLOGY | Facility: CLINIC | Age: 68
End: 2018-12-27

## 2018-12-27 NOTE — TELEPHONE ENCOUNTER
Called pt to confirm arrival time 645am for procedure. Gave pt NPO instructions and gave pt opportunity to ask questions. Pt verbalized understanding.

## 2018-12-28 ENCOUNTER — ANESTHESIA (OUTPATIENT)
Dept: SURGERY | Facility: HOSPITAL | Age: 68
End: 2018-12-28
Payer: MEDICARE

## 2018-12-28 ENCOUNTER — HOSPITAL ENCOUNTER (OUTPATIENT)
Facility: HOSPITAL | Age: 68
Discharge: HOME | End: 2018-12-28
Attending: UROLOGY | Admitting: UROLOGY
Payer: MEDICARE

## 2018-12-28 VITALS
SYSTOLIC BLOOD PRESSURE: 121 MMHG | DIASTOLIC BLOOD PRESSURE: 71 MMHG | TEMPERATURE: 98 F | BODY MASS INDEX: 33.6 KG/M2 | RESPIRATION RATE: 12 BRPM | OXYGEN SATURATION: 97 % | WEIGHT: 240 LBS | HEART RATE: 83 BPM | HEIGHT: 71 IN

## 2018-12-28 DIAGNOSIS — E11.9 TYPE 2 DIABETES MELLITUS WITHOUT COMPLICATION: ICD-10-CM

## 2018-12-28 DIAGNOSIS — N40.1 BENIGN PROSTATIC HYPERPLASIA WITH URINARY FREQUENCY: Primary | ICD-10-CM

## 2018-12-28 DIAGNOSIS — N40.1 BPH WITH OBSTRUCTION/LOWER URINARY TRACT SYMPTOMS: ICD-10-CM

## 2018-12-28 DIAGNOSIS — R35.0 BENIGN PROSTATIC HYPERPLASIA WITH URINARY FREQUENCY: Primary | ICD-10-CM

## 2018-12-28 DIAGNOSIS — N13.8 BPH WITH OBSTRUCTION/LOWER URINARY TRACT SYMPTOMS: ICD-10-CM

## 2018-12-28 LAB
POCT GLUCOSE: 186 MG/DL (ref 70–110)
POCT GLUCOSE: 190 MG/DL (ref 70–110)

## 2018-12-28 PROCEDURE — 37000009 HC ANESTHESIA EA ADD 15 MINS: Performed by: UROLOGY

## 2018-12-28 PROCEDURE — 25000003 PHARM REV CODE 250: Performed by: STUDENT IN AN ORGANIZED HEALTH CARE EDUCATION/TRAINING PROGRAM

## 2018-12-28 PROCEDURE — 25000003 PHARM REV CODE 250: Performed by: NURSE ANESTHETIST, CERTIFIED REGISTERED

## 2018-12-28 PROCEDURE — 71000044 HC DOSC ROUTINE RECOVERY FIRST HOUR: Performed by: UROLOGY

## 2018-12-28 PROCEDURE — D9220A PRA ANESTHESIA: Mod: CRNA,,, | Performed by: NURSE ANESTHETIST, CERTIFIED REGISTERED

## 2018-12-28 PROCEDURE — 82962 GLUCOSE BLOOD TEST: CPT | Performed by: UROLOGY

## 2018-12-28 PROCEDURE — 63600175 PHARM REV CODE 636 W HCPCS: Performed by: STUDENT IN AN ORGANIZED HEALTH CARE EDUCATION/TRAINING PROGRAM

## 2018-12-28 PROCEDURE — D9220A PRA ANESTHESIA: Mod: ANES,,, | Performed by: ANESTHESIOLOGY

## 2018-12-28 PROCEDURE — 63600175 PHARM REV CODE 636 W HCPCS: Performed by: NURSE ANESTHETIST, CERTIFIED REGISTERED

## 2018-12-28 PROCEDURE — 37000008 HC ANESTHESIA 1ST 15 MINUTES: Performed by: UROLOGY

## 2018-12-28 PROCEDURE — 71000045 HC DOSC ROUTINE RECOVERY EA ADD'L HR: Performed by: UROLOGY

## 2018-12-28 PROCEDURE — 36000706: Performed by: UROLOGY

## 2018-12-28 PROCEDURE — 71000015 HC POSTOP RECOV 1ST HR: Performed by: UROLOGY

## 2018-12-28 PROCEDURE — 52648 LASER SURGERY OF PROSTATE: CPT | Mod: GC,,, | Performed by: UROLOGY

## 2018-12-28 PROCEDURE — C1769 GUIDE WIRE: HCPCS | Performed by: UROLOGY

## 2018-12-28 PROCEDURE — 36000707: Performed by: UROLOGY

## 2018-12-28 RX ORDER — HYDROCODONE BITARTRATE AND ACETAMINOPHEN 5; 325 MG/1; MG/1
1 TABLET ORAL EVERY 4 HOURS PRN
Status: DISCONTINUED | OUTPATIENT
Start: 2018-12-28 | End: 2018-12-28 | Stop reason: HOSPADM

## 2018-12-28 RX ORDER — LIDOCAINE HYDROCHLORIDE 10 MG/ML
1 INJECTION, SOLUTION EPIDURAL; INFILTRATION; INTRACAUDAL; PERINEURAL ONCE
Status: COMPLETED | OUTPATIENT
Start: 2018-12-28 | End: 2018-12-28

## 2018-12-28 RX ORDER — ROCURONIUM BROMIDE 10 MG/ML
INJECTION, SOLUTION INTRAVENOUS
Status: DISCONTINUED | OUTPATIENT
Start: 2018-12-28 | End: 2018-12-28

## 2018-12-28 RX ORDER — EPHEDRINE SULFATE 50 MG/ML
INJECTION, SOLUTION INTRAVENOUS
Status: DISCONTINUED | OUTPATIENT
Start: 2018-12-28 | End: 2018-12-28

## 2018-12-28 RX ORDER — LIDOCAINE HYDROCHLORIDE 10 MG/ML
1 INJECTION, SOLUTION EPIDURAL; INFILTRATION; INTRACAUDAL; PERINEURAL ONCE
Status: DISCONTINUED | OUTPATIENT
Start: 2018-12-28 | End: 2018-12-28 | Stop reason: HOSPADM

## 2018-12-28 RX ORDER — CEFAZOLIN SODIUM 1 G/3ML
2 INJECTION, POWDER, FOR SOLUTION INTRAMUSCULAR; INTRAVENOUS
Status: COMPLETED | OUTPATIENT
Start: 2018-12-28 | End: 2018-12-28

## 2018-12-28 RX ORDER — TAMSULOSIN HYDROCHLORIDE 0.4 MG/1
0.4 CAPSULE ORAL DAILY
Qty: 30 CAPSULE | Refills: 0 | Status: SHIPPED | OUTPATIENT
Start: 2018-12-28 | End: 2019-03-26

## 2018-12-28 RX ORDER — ONDANSETRON 2 MG/ML
INJECTION INTRAMUSCULAR; INTRAVENOUS
Status: DISCONTINUED | OUTPATIENT
Start: 2018-12-28 | End: 2018-12-28

## 2018-12-28 RX ORDER — LIDOCAINE HCL/PF 100 MG/5ML
SYRINGE (ML) INTRAVENOUS
Status: DISCONTINUED | OUTPATIENT
Start: 2018-12-28 | End: 2018-12-28

## 2018-12-28 RX ORDER — ONDANSETRON 8 MG/1
8 TABLET, ORALLY DISINTEGRATING ORAL EVERY 8 HOURS PRN
Status: DISCONTINUED | OUTPATIENT
Start: 2018-12-28 | End: 2018-12-28 | Stop reason: HOSPADM

## 2018-12-28 RX ORDER — PHENYLEPHRINE HYDROCHLORIDE 10 MG/ML
INJECTION INTRAVENOUS
Status: DISCONTINUED | OUTPATIENT
Start: 2018-12-28 | End: 2018-12-28

## 2018-12-28 RX ORDER — CEFAZOLIN SODIUM 1 G/3ML
2 INJECTION, POWDER, FOR SOLUTION INTRAMUSCULAR; INTRAVENOUS
Status: DISCONTINUED | OUTPATIENT
Start: 2018-12-28 | End: 2018-12-28 | Stop reason: HOSPADM

## 2018-12-28 RX ORDER — SODIUM CHLORIDE 9 MG/ML
INJECTION, SOLUTION INTRAVENOUS CONTINUOUS
Status: DISCONTINUED | OUTPATIENT
Start: 2018-12-28 | End: 2018-12-28 | Stop reason: HOSPADM

## 2018-12-28 RX ORDER — GLYCOPYRROLATE 0.2 MG/ML
INJECTION INTRAMUSCULAR; INTRAVENOUS
Status: DISCONTINUED | OUTPATIENT
Start: 2018-12-28 | End: 2018-12-28

## 2018-12-28 RX ORDER — HYOSCYAMINE SULFATE 0.125 MG
125 TABLET ORAL EVERY 4 HOURS PRN
Qty: 30 TABLET | Refills: 0 | Status: SHIPPED | OUTPATIENT
Start: 2018-12-28 | End: 2019-04-09

## 2018-12-28 RX ORDER — NEOSTIGMINE METHYLSULFATE 1 MG/ML
INJECTION, SOLUTION INTRAVENOUS
Status: DISCONTINUED | OUTPATIENT
Start: 2018-12-28 | End: 2018-12-28

## 2018-12-28 RX ORDER — FENTANYL CITRATE 50 UG/ML
INJECTION, SOLUTION INTRAMUSCULAR; INTRAVENOUS
Status: DISCONTINUED | OUTPATIENT
Start: 2018-12-28 | End: 2018-12-28

## 2018-12-28 RX ORDER — HYDROCODONE BITARTRATE AND ACETAMINOPHEN 5; 325 MG/1; MG/1
1 TABLET ORAL EVERY 6 HOURS PRN
Qty: 7 TABLET | Refills: 0 | Status: SHIPPED | OUTPATIENT
Start: 2018-12-28 | End: 2019-01-28

## 2018-12-28 RX ORDER — ACETAMINOPHEN 10 MG/ML
INJECTION, SOLUTION INTRAVENOUS
Status: DISCONTINUED | OUTPATIENT
Start: 2018-12-28 | End: 2018-12-28

## 2018-12-28 RX ORDER — PROPOFOL 10 MG/ML
VIAL (ML) INTRAVENOUS
Status: DISCONTINUED | OUTPATIENT
Start: 2018-12-28 | End: 2018-12-28

## 2018-12-28 RX ADMIN — PHENYLEPHRINE HYDROCHLORIDE 200 MCG: 10 INJECTION INTRAVENOUS at 10:12

## 2018-12-28 RX ADMIN — SODIUM CHLORIDE: 0.9 INJECTION, SOLUTION INTRAVENOUS at 07:12

## 2018-12-28 RX ADMIN — ONDANSETRON 4 MG: 2 INJECTION INTRAMUSCULAR; INTRAVENOUS at 10:12

## 2018-12-28 RX ADMIN — LIDOCAINE HYDROCHLORIDE 0.2 MG: 10 INJECTION, SOLUTION EPIDURAL; INFILTRATION; INTRACAUDAL; PERINEURAL at 07:12

## 2018-12-28 RX ADMIN — EPHEDRINE SULFATE 10 MG: 50 INJECTION, SOLUTION INTRAMUSCULAR; INTRAVENOUS; SUBCUTANEOUS at 09:12

## 2018-12-28 RX ADMIN — PHENYLEPHRINE HYDROCHLORIDE 100 MCG: 10 INJECTION INTRAVENOUS at 09:12

## 2018-12-28 RX ADMIN — PROPOFOL 100 MG: 10 INJECTION, EMULSION INTRAVENOUS at 09:12

## 2018-12-28 RX ADMIN — SODIUM CHLORIDE, SODIUM GLUCONATE, SODIUM ACETATE, POTASSIUM CHLORIDE, MAGNESIUM CHLORIDE, SODIUM PHOSPHATE, DIBASIC, AND POTASSIUM PHOSPHATE: .53; .5; .37; .037; .03; .012; .00082 INJECTION, SOLUTION INTRAVENOUS at 10:12

## 2018-12-28 RX ADMIN — LIDOCAINE HYDROCHLORIDE 100 MG: 20 INJECTION, SOLUTION INTRAVENOUS at 09:12

## 2018-12-28 RX ADMIN — ACETAMINOPHEN 1000 MG: 10 INJECTION, SOLUTION INTRAVENOUS at 10:12

## 2018-12-28 RX ADMIN — GLYCOPYRROLATE 0.6 MG: 0.2 INJECTION, SOLUTION INTRAMUSCULAR; INTRAVENOUS at 10:12

## 2018-12-28 RX ADMIN — ROCURONIUM BROMIDE 50 MG: 10 INJECTION, SOLUTION INTRAVENOUS at 09:12

## 2018-12-28 RX ADMIN — FENTANYL CITRATE 100 MCG: 50 INJECTION, SOLUTION INTRAMUSCULAR; INTRAVENOUS at 09:12

## 2018-12-28 RX ADMIN — NEOSTIGMINE METHYLSULFATE 5 MG: 1 INJECTION INTRAVENOUS at 10:12

## 2018-12-28 RX ADMIN — CEFAZOLIN 2 G: 330 INJECTION, POWDER, FOR SOLUTION INTRAMUSCULAR; INTRAVENOUS at 09:12

## 2018-12-28 RX ADMIN — SODIUM CHLORIDE: 0.9 INJECTION, SOLUTION INTRAVENOUS at 09:12

## 2018-12-28 RX ADMIN — PROPOFOL 300 MG: 10 INJECTION, EMULSION INTRAVENOUS at 09:12

## 2018-12-28 NOTE — TRANSFER OF CARE
"Anesthesia Transfer of Care Note    Patient: Caesar Arevalo    Procedure(s) Performed: Procedure(s) (LRB):  TURP, USING LASER (N/A)    Patient location: PACU    Anesthesia Type: general    Transport from OR: Transported from OR on 6-10 L/min O2 by face mask with adequate spontaneous ventilation    Post pain: adequate analgesia    Post assessment: no apparent anesthetic complications    Post vital signs: stable    Level of consciousness: awake    Nausea/Vomiting: no nausea/vomiting    Complications: none    Transfer of care protocol was followed      Last vitals:   Visit Vitals  BP (!) 136/91 (BP Location: Left arm, Patient Position: Lying)   Pulse 92   Temp 36.6 °C (97.8 °F) (Oral)   Resp 16   Ht 5' 11" (1.803 m)   Wt 108.9 kg (240 lb)   SpO2 95%   BMI 33.47 kg/m²     "

## 2018-12-28 NOTE — DISCHARGE INSTRUCTIONS
Transurethral Resection of the Prostate (TURP)     Excess prostate tissue is removed during a TURP to let urine flow freely through the urethra.   TURP is surgery to treat a benign enlargement of the prostate, or BPH (benign prostatic hyperplasia). This surgery removes prostate tissue to relieve pressure on the urethra. This helps relieve symptoms, such as:  · Urinary obstruction  · Frequent urination  · Decreased urinary stream  TURP is the most common procedure for the treatment of BPH. But certain other procedures also help relieve BPH symptoms. Your health care provider may do one of these instead of TURP. They include TUIP (transurethral incision of the prostate), TUNA (transurethral needle ablation), or laser ablation. If you will have one of these procedures, your healthcare provider can tell you more about it. Your preparation and experience during surgery will be similar to TURP.   Preparing for surgery  Your healthcare provider will tell you how to prepare for your procedure. For instance, you may be asked to stop taking certain medicines a few days before the procedure. You may be asked not to eat or drink anything after the midnight before surgery. Be sure to follow any special instructions youre given.  During the TURP procedure  · You will be given medicine (anesthesia) to keep you from feeling pain during the procedure. It may be given into your spine (epidural). This is not meant to put you to sleep, but it will numb the area where the surgery is being done. In some cases, general anesthesia is used. This is to keep you sleeping throughout the surgery. The anesthesia provider (anesthesiologist or nurse anesthetist) will talk to you about the pain medicine that is best for you.  · The surgeon inserts a cystoscope (a thin, telescope-like device) into your urethra. This device lets him or her see the blocked part of the urethra.  · A cutting device is inserted through the cystoscope. This is used to  remove the excess prostate tissue. The cut pieces of tissue collect in the bladder. These pieces are continuously washed away with fluids during the procedure.   · The tissue pieces are sent to the lab to be sure they are free of cancer.   Possible risks and complications of prostate procedures  · Bleeding  · Infection  · Scarring of the urethra  · Retrograde ejaculation  · Erectile dysfunction (rare)  · Absorption of fluid during the procedure (TURP syndrome)  · Permanent incontinence (very rare)   Retrograde ejaculation  After some surgical treatments, semen may travel into the bladder instead of out of the penis during ejaculation. This side effect is called retrograde ejaculation. As a result, there may be little or no semen when you ejaculate, which can result in infertility. If you are planning to have children, talk to your healthcare provider before having the TURP procedure. Otherwise, this is not harmful to your bladder, and the feeling or orgasm and your erection won't change. Retrograde ejaculation can also be a side effect of certain medicines.  Date Last Reviewed: 1/1/2017 © 2000-2017 [employer]. [address] [place] [address]. All rights reserved. This information is not intended as a substitute for professional medical care. Always follow your healthcare professional's instructions.        Transurethral Resection of the Prostate (TURP): Home Recovery  Take it easy for the first month or so while you heal after transurethral resection of the prostate. During the first few weeks, you may feel burning when you pass urine. You may also feel like you have to urinate often. These sensations will go away. If your urine becomes bright red, it means that the treated area is bleeding. This may happen on and off for a month or so after a TURP. If this occurs, rest and drink plenty of fluids until the bleeding stops.    While you are healing  To help prevent problems during the first  month after your surgery, follow these tips:  · Drink plenty of fluids.  · Avoid strenuous exercise.  · Dont lift anything over 10 pounds.  · Avoid sexual activity and strenuous exercise.  · Avoid straining at stool. If you are constipated, take stool softeners or laxatives for a few days.  · Talk to your doctor about when you can return to work.  · Ask your doctor when you can start driving again.  · Dont sit for more than 60 minutes without getting up.  · Check with your doctor before taking over-the-counter pain relievers. These include aspirin, ibuprofen, and naproxen.  Follow-up care  You will visit your doctor to make sure you are healing without problems. If tests were done on your prostate tissue your doctor will discuss the results with you.     When to call your healthcare provider  Call your healthcare provider right away if any of these occur:  · Youre not able to urinate, or notice a decrease in urine flow  · You have a fever of 100.4°F (38°C) or higher, or as directed by your doctor  · You have severe pain that is not relieved by prescription pain medicine  · You have bleeding that doesnt stop within 12 hours  · You have bleeding with clots, or blood plugs up the catheter. (A little blood in the urine is normal.)  · The catheter falls out   Getting back to sex  BPH and its treatments rarely cause problems with sex. Even if you have retrograde ejaculation, your erection or orgasm shouldnt feel any different than it used to. Retrograde ejaculation can result in infertility, as the semen will not come out of the penis. If you notice any problems with sex, talk to your doctor. Help may be available.  Trouble controlling your urine  Some men will have trouble controlling their urine (urinary incontinence) after this surgery. This may last for a few days, weeks, or months, but it will improve with time. You may also pass your urine more often (urinary frequency), like you did before the surgery. This  will also improve as you start to heal.  Date Last Reviewed: 1/1/2017  © 9299-8130 [employer]. [address] [place] [address], [place] [address]. All rights reserved. This information is not intended as a substitute for professional medical care. Always follow your healthcare professional's instructions.            Discharge Instructions: Caring for Your Indwelling Urinary Catheter  You have been discharged with an indwelling urinary catheter (also called a Rich catheter). A catheter is a thin, flexible tube. An indwelling urinary catheter has two parts. The first part is a tube that drains urine from your bladder. The second part is a bag or other device that collects the urine.  The most important thing to remember is that you want to prevent infection. Always wash your hands before handling your catheter bag or tubing.  Draining the bedside bag  · Wash your hands with soap and clean, running water or use an alcohol-based hand  that contains at least 60% alcohol.  · Hold the drainage tube over a toilet or measuring container.  · Unclamp the tube and let the bag drain.  · Dont touch the tip of the drainage tube or let it touch the toilet or container.  Cleaning the drainage tube  · When the bag is empty, clean the tip of the drainage tube with an alcohol wipe.  · Clamp the tube.  · Reinsert the tube into the pocket on the drainage bag.  Cleaning your skin and tubing  · Clean the skin near the catheter with soap and water.  · Wash your genital area from front to back.  · Wash the catheter tubing. Always wash the catheter in the direction away from your body.  · You will be told when and how to change your bag and tubing.  · Dont try to remove the catheter by yourself.  · You may shower with the catheter in place.  Emptying a leg bag  · Wash your hands.  · Remove the stopper on the bag.  · Drain the bag into the toilet or a measuring container. Dont let the tip of the drainage tube touch anything,  including your fingers.  · Clean the tip of the drainage tube with alcohol.  · Replace the stopper.  Follow-up care  Make a follow-up appointment as directed by your healthcare provider  When to call your healthcare provider  Call your healthcare provider right away if you have any of the following:  · Chills or fever above 100.4°F (38°C)  · Leakage around the catheter insertion site  · Increased spasms (uncontrollable twitching) in your legs, abdomen, or bladder. Occasional mild spasms are normal.  · Burning in the urinary tract, penis, or genital area  · Nausea and vomiting  · Aching in the lower back  · Cloudy or bloody (pink or red) urine, sediment or mucus in the urine, or foul-smelling urine   Date Last Reviewed: 1/1/2017 © 2000-2017 [employer]. [address] [place] [address]. All rights reserved. This information is not intended as a substitute for professional medical care. Always follow your healthcare professional's instructions.      Discharge Instructions: Caring for Your Leg Bag  You are going home with a urinary catheter and collection device (drainage bag) in place. One type of collection device is called a leg bag. This is a smaller drainage bag that you can wear on your leg to collect urine during the day. The bag can fit under your clothing. You can move around with greater ease when using a leg bag instead of a larger collection bag.  You were shown how to care for your catheter in the hospital. This sheet will help you remember those steps when you are at home.  Home care  · Wash your hands thoroughly before and after you care for your catheter or collection device.  · Gather your supplies:  ¨ Alcohol wipes  ¨ Soap and water  ¨ Towel and washcloth  ¨ Leg strap and leg bag  · Use soap and water to wash the area where your catheter enters your body. Rinse well.  · Secure the bag barbosa to your leg:  ¨ Position the leg band high on your thigh with the product label  pointing away from your leg.  ¨ Stretch the leg band in place and fasten.  ¨ Place the catheter tubing over the bag and secure it. You may secure it with a Velcro tab or other method, depending on the product you use. Be sure to leave enough loop in the catheter above the leg band to avoid pulling on the tube.  ¨ Every 4 to 6 hours, reposition the band to prevent pressure from the elastic on your leg. You can do this by changing the bag to the other leg or by raising or lowering the leg band.  ¨ Wash the band as frequently as needed. You can hand wash and dry the leg band.  · Place the bag in the bag barbosa.  · Clean the urine bag end of the catheter and your catheter port with an alcohol wipe.  · Place a towel under the bag and port to keep urine from dripping onto your leg.  · Before connecting the outlet valve at the bottom of the bag to the catheter, make sure that it is firmly closed. Flip the valve upward toward the bag; it needs to snap firmly in place. Be sure not to tug on the tubing. Be gentle.  · Attach the urine bag to the end of the catheter; insert the connector snugly into the catheter port. You can avoid dribbling urine by bending the catheter tubing just below the tip and holding it while you disconnect it from the catheter. Be careful to keep the tip clean while connecting the leg bag tubing to the catheter--this keeps germs from getting into the system.  · Drain the bag when it is full. To drain the bag, flip the clamp downward. Direct the flexible outlet tube to control the flow of urine. You dont have to disconnect the leg bag from the catheter to empty it. Raise your leg up to the edge of the toilet to reach the leg bag. Then you can empty the bag directly into the toilet. This way, you wont need to bend over, which may be uncomfortable.  · Keep the leg bag clean. Rinse daily with equal parts water and vinegar to reduce odor and keep the bag free of germs.  · Remember to keep the drainage bag  below the level of your bladder for proper drainage.  Follow-up  Make a follow-up appointment as directed by your healthcare provider.  When to call your healthcare provider  Call your healthcare provider right away if you have any of the following:  · Redness, swelling, or warmth around the catheter entry site  · Pus draining from your catheter entry site or into the catheter tubing and bag  · Blood, clots, or floating debris in the urine  · Nausea and vomiting  · Shaking chills  · Fever above 100.4°F (38°C)  · Pain that is not relieved by medicine   · Catheter that falls out or is dislodged   Date Last Reviewed: 1/1/2017 © 2000-2017 [employer]. [address] [place] [address], [place] [address]. All rights reserved. This information is not intended as a substitute for professional medical care. Always follow your healthcare professional's instructions.      Discharge Instructions: After Your Surgery  Youve just had surgery. During surgery, you were given medicine called anesthesia to keep you relaxed and free of pain. After surgery, you may have some pain or nausea. This is common. Here are some tips for feeling better and getting well after surgery.     Stay on schedule with your medicine.   Going home  Your healthcare provider will show you how to take care of yourself when you go home. He or she will also answer your questions. Have an adult family member or friend drive you home. For the first 24 hours after your surgery:  · Do not drive or use heavy equipment.  · Do not make important decisions or sign legal papers.  · Do not drink alcohol.  · Have someone stay with you, if needed. He or she can watch for problems and help keep you safe.  Be sure to go to all follow-up visits with your healthcare provider. And rest after your surgery for as long as your healthcare provider tells you to.  Coping with pain  If you have pain after surgery, pain medicine will help you feel better. Take it as told, before  pain becomes severe. Also, ask your healthcare provider or pharmacist about other ways to control pain. This might be with heat, ice, or relaxation. And follow any other instructions your surgeon or nurse gives you.  Tips for taking pain medicine  To get the best relief possible, remember these points:  · Pain medicines can upset your stomach. Taking them with a little food may help.  · Most pain relievers taken by mouth need at least 20 to 30 minutes to start to work.  · Taking medicine on a schedule can help you remember to take it. Try to time your medicine so that you can take it before starting an activity. This might be before you get dressed, go for a walk, or sit down for dinner.  · Constipation is a common side effect of pain medicines. Call your healthcare provider before taking any medicines such as laxatives or stool softeners to help ease constipation. Also ask if you should skip any foods. Drinking lots of fluids and eating foods such as fruits and vegetables that are high in fiber can also help. Remember, do not take laxatives unless your surgeon has prescribed them.  · Drinking alcohol and taking pain medicine can cause dizziness and slow your breathing. It can even be deadly. Do not drink alcohol while taking pain medicine.  · Pain medicine can make you react more slowly to things. Do not drive or run machinery while taking pain medicine.  Your healthcare provider may tell you to take acetaminophen to help ease your pain. Ask him or her how much you are supposed to take each day. Acetaminophen or other pain relievers may interact with your prescription medicines or other over-the-counter (OTC) medicines. Some prescription medicines have acetaminophen and other ingredients. Using both prescription and OTC acetaminophen for pain can cause you to overdose. Read the labels on your OTC medicines with care. This will help you to clearly know the list of ingredients, how much to take, and any warnings. It  may also help you not take too much acetaminophen. If you have questions or do not understand the information, ask your pharmacist or healthcare provider to explain it to you before you take the OTC medicine.  Managing nausea  Some people have an upset stomach after surgery. This is often because of anesthesia, pain, or pain medicine, or the stress of surgery. These tips will help you handle nausea and eat healthy foods as you get better. If you were on a special food plan before surgery, ask your healthcare provider if you should follow it while you get better. These tips may help:  · Do not push yourself to eat. Your body will tell you when to eat and how much.  · Start off with clear liquids and soup. They are easier to digest.  · Next try semi-solid foods, such as mashed potatoes, applesauce, and gelatin, as you feel ready.  · Slowly move to solid foods. Dont eat fatty, rich, or spicy foods at first.  · Do not force yourself to have 3 large meals a day. Instead eat smaller amounts more often.  · Take pain medicines with a small amount of solid food, such as crackers or toast, to avoid nausea.     Call your surgeon if  · You still have pain an hour after taking medicine. The medicine may not be strong enough.  · You feel too sleepy, dizzy, or groggy. The medicine may be too strong.  · You have side effects like nausea, vomiting, or skin changes, such as rash, itching, or hives.       If you have obstructive sleep apnea  You were given anesthesia medicine during surgery to keep you comfortable and free of pain. After surgery, you may have more apnea spells because of this medicine and other medicines you were given. The spells may last longer than usual.   At home:  · Keep using the continuous positive airway pressure (CPAP) device when you sleep. Unless your healthcare provider tells you not to, use it when you sleep, day or night. CPAP is a common device used to treat obstructive sleep apnea.  · Talk with your  provider before taking any pain medicine, muscle relaxants, or sedatives. Your provider will tell you about the possible dangers of taking these medicines.  Date Last Reviewed: 12/1/2016  © 0849-3017 [employer]. [address] [place] [address], [place] [address]. All rights reserved. This information is not intended as a substitute for professional medical care. Always follow your healthcare professional's instructions.

## 2018-12-28 NOTE — PLAN OF CARE
Patient arrived from OR with JOY Felix CRNA and RICARDO Geiger MD.  Patient stable.  Report received at this time.  Assumed care of patient at this time.

## 2018-12-28 NOTE — ANESTHESIA RELEASE NOTE
"Anesthesia Release from PACU Note    Patient: Caesar Arevalo    Procedure(s) Performed: Procedure(s) (LRB):  TURP, USING LASER (N/A)    Final Anesthesia Type: general  Patient location during evaluation: PACU  Patient participation: Yes- Able to Participate  Level of consciousness: awake and alert  Post-procedure vital signs: reviewed and stable  Pain management: adequate  Airway patency: patent  PONV status at discharge: No PONV  Anesthetic complications: no      Cardiovascular status: blood pressure returned to baseline  Respiratory status: unassisted  Hydration status: euvolemic  Follow-up not needed.        Visit Vitals  /71 (BP Location: Right arm, Patient Position: Lying)   Pulse 83   Temp 36.6 °C (97.9 °F) (Temporal)   Resp 14   Ht 5' 11" (1.803 m)   Wt 108.9 kg (240 lb)   SpO2 95%   BMI 33.47 kg/m²       Pain/Sanchez Score: Sanchez Score: 9 (12/28/2018 11:00 AM)      Vitals - 1 value per visit 6/27/2016 6/27/2016 6/30/2016 7/25/2016   SPO2    95     Vitals - 1 value per visit 7/25/2016 7/28/2016 8/22/2016 8/24/2016   SPO2  96       Vitals - 1 value per visit 10/3/2016 10/11/2016 10/12/2016 10/25/2016   SPO2 96  96      Vitals - 1 value per visit 11/1/2016 11/8/2016 12/5/2016 1/16/2017   SPO2 96        Vitals - 1 value per visit 1/30/2017 2/1/2017 4/17/2017 5/4/2017 6/6/2017   SPO2  96        Vitals - 1 value per visit 6/6/2017 6/12/2017 6/15/2017 6/22/2017 6/26/2017   SPO2 96 94 95  97     Vitals - 1 value per visit 7/28/2017 8/1/2017 8/25/2017 10/16/2017 1/8/2018   SPO2    97      Vitals - 1 value per visit 1/16/2018 3/15/2018 3/28/2018 4/11/2018   SPO2 96 97       Vitals - 1 value per visit 5/15/2018 5/21/2018 5/25/2018 6/8/2018 6/14/2018   SPO2 93    94     Vitals - 1 value per visit 7/16/2018 8/28/2018 8/30/2018 9/13/2018   SPO2   97      Vitals - 1 value per visit 10/3/2018 10/16/2018 11/27/2018 12/28/2018   SPO2    96     "

## 2018-12-28 NOTE — DISCHARGE SUMMARY
[facility]  Discharge Note  Short Stay    Admit Date: 12/28/2018    Discharge Date and Time: 12/28/2018 10:34 AM      Attending Physician: Herman Johnson Jr., MD     Discharge Provider: Delfin Geiger    Diagnoses:  Active Hospital Problems    Diagnosis  POA    BPH with obstruction/lower urinary tract symptoms [N40.1, N13.8]  Yes    Benign prostatic hyperplasia with urinary frequency [N40.1, R35.0]  Yes      Resolved Hospital Problems   No resolved problems to display.       Discharged Condition: good    Hospital Course: Patient was admitted for laser TURP and tolerated the procedure well with no complications. The patient was discharged home in good condition on the same day.       Final Diagnoses: Same as principal problem.    Disposition: Home or Self Care    Follow up/Patient Instructions:    Medications:  Reconciled Home Medications:   Current Discharge Medication List      START taking these medications    Details   HYDROcodone-acetaminophen (NORCO) 5-325 mg per tablet Take 1 tablet by mouth every 6 (six) hours as needed for Pain.  Qty: 7 tablet, Refills: 0      hyoscyamine (ANASPAZ,LEVSIN) 0.125 mg Tab Take 1 tablet (125 mcg total) by mouth every 4 (four) hours as needed (bladder spasm).  Qty: 30 tablet, Refills: 0      tamsulosin (FLOMAX) 0.4 mg Cap Take 1 capsule (0.4 mg total) by mouth once daily.  Qty: 30 capsule, Refills: 0         CONTINUE these medications which have NOT CHANGED    Details   amLODIPine (NORVASC) 10 MG tablet TAKE 1 TABLET EVERY MORNING  Qty: 90 tablet, Refills: 1    Associated Diagnoses: Essential hypertension      aspirin (ECOTRIN) 81 MG EC tablet Take 81 mg by mouth once daily.      atorvastatin (LIPITOR) 20 MG tablet TAKE 1 TABLET EVERY MORNING  Qty: 90 tablet, Refills: 1    Associated Diagnoses: Hyperlipidemia, unspecified hyperlipidemia type; Type 2 diabetes mellitus with diabetic neuropathy, without long-term current use of insulin      cholecalciferol, vitamin D3,  (VITAMIN D3) 1,000 unit capsule Take 1,000 Units by mouth once daily.      linagliptin (TRADJENTA) 5 mg Tab tablet Take 1 tablet (5 mg total) by mouth once daily.  Qty: 90 tablet, Refills: 1    Associated Diagnoses: Type 2 diabetes mellitus with diabetic neuropathy, without long-term current use of insulin      lisinopril (PRINIVIL,ZESTRIL) 40 MG tablet Take 1 tablet (40 mg total) by mouth once daily.  Qty: 90 tablet, Refills: 1    Associated Diagnoses: Essential hypertension      metFORMIN (GLUCOPHAGE) 1000 MG tablet Take 1 tablet (1,000 mg total) by mouth 2 (two) times daily with meals.  Qty: 180 tablet, Refills: 1    Associated Diagnoses: Type 2 diabetes mellitus with diabetic neuropathy, without long-term current use of insulin      multivit-min-folic-vit K-lycop (ONE-A-DAY MEN'S 50 PLUS) 400- mcg Tab Take by mouth.      omega-3 fatty acids (FISH OIL) 300 mg Cap Take 300 mg by mouth.      blood sugar diagnostic Strp 1 strip by Misc.(Non-Drug; Combo Route) route 3 (three) times daily. freestyle  Qty: 300 strip, Refills: 6    Associated Diagnoses: Type 2 diabetes mellitus without complication, without long-term current use of insulin      blood-glucose meter (FREESTYLE SYSTEM KIT) kit Use as instructed  Qty: 1 each, Refills: 0    Associated Diagnoses: Type 2 diabetes mellitus with diabetic neuropathy, without long-term current use of insulin      clindamycin (CLEOCIN T) 1 % external solution Apply to scalp BID prn bumps  Qty: 60 mL, Refills: 1    Associated Diagnoses: Folliculitis      diclofenac sodium 1 % Gel Apply 2 g topically once daily.  Qty: 100 g, Refills: 0    Associated Diagnoses: Right elbow pain      ketoconazole (NIZORAL) 2 % shampoo Wash hair with medicated shampoo at least 2x/week - let sit on scalp at least 5 minutes prior to rinsing  Qty: 120 mL, Refills: 5    Associated Diagnoses: Folliculitis      lancets (FREESTYLE LANCETS) 28 gauge Misc 1 lancet by Other route 3 (three) times  daily.  Qty: 100 each, Refills: 5    Associated Diagnoses: Type 2 diabetes mellitus with diabetic neuropathy, without long-term current use of insulin; Type 2 diabetes mellitus without complication, without long-term current use of insulin      loratadine (CLARITIN) 10 mg tablet TAKE 1 TABLET DAILY  Qty: 90 tablet, Refills: 1    Associated Diagnoses: Allergic rhinitis      mupirocin (BACTROBAN) 2 % ointment Apply topically 3 (three) times daily.  Qty: 30 g, Refills: 0    Associated Diagnoses: Folliculitis      naftifine 2 % Gel Apply 1 application topically once daily.  Qty: 60 g, Refills: 2           Discharge Procedure Orders   Diet general     Call MD for:  temperature >100.4     Call MD for:  persistent nausea and vomiting     Call MD for:  severe uncontrolled pain     Call MD for:  difficulty breathing, headache or visual disturbances     Call MD for:  hives     Call MD for:  persistent dizziness or light-headedness     Call MD for:  extreme fatigue     Follow-up Information     [facility] 4th [facility] On 1/2/2019.    Specialty:  Urology  Why:  for Rich catheter removal and voiding trial with NP  Contact information:  [address]  [facility] [address] [phone]  Additional information:  [facility] 4[facility]

## 2018-12-28 NOTE — INTERVAL H&P NOTE
The patient has been examined and the H&P has been reviewed:    I concur with the findings and no changes have occurred since H&P was written.   UA neg for all components  Off ASA 81 mg x 8 days    Anesthesia/Surgery risks, benefits and alternative options discussed and understood by patient/family.          Active Hospital Problems    Diagnosis  POA    BPH with obstruction/lower urinary tract symptoms [N40.1, N13.8]  Yes    Benign prostatic hyperplasia with urinary frequency [N40.1, R35.0]  Yes      Resolved Hospital Problems   No resolved problems to display.

## 2018-12-28 NOTE — PLAN OF CARE
Patient and patient's spouse received discharge instructions and prescriptions.  Patient and patient's spouse verbalized understanding of all instructions given and all questions were addressed prior to patient's discharge.  Patient's vital signs are stable and within patient's baseline.  Patient tolerated clear liquids PO.  Patient going home with Rich catheter.  Patient denies pain.  Patient denies nausea and vomiting at this time.  Patient meets all criteria for discharge at this time.  All required consents present in patient's chart upon patient's discharge.

## 2018-12-28 NOTE — OP NOTE
[facility]  Operative Note    Date: 12/28/2018    Pre-Op Diagnosis: BPH    Post-Op Diagnosis: same    Procedure(s) Performed:   1.  Laser vaporization of the prostate    Specimen(s): none    Staff Surgeon: eHrman Johnson MD    Assistant Surgeon: Delfin Geiger MD, Kenna Henry MD    Anesthesia: General endotracheal anesthesia    Indications: Caesar Arevalo is a 68 y.o. male with BPH    Findings:   Regrowth of adenoma with trilobar enlargement  Lobes vaporized, good hemostasis and open channel  Wide mouthed short segment stricture in bulbar urethra, easily passed through with cystoscope    Estimated Blood Loss: min    Drains: 22 Fr maurice catheter    Procedure in Detail:  After risks, benefits and possible complications of Laser TURP were discussed, the patient elected to undergo the procedure and informed consent was obtained. All questions were answered in the tg-operative area. The patient was transferred to the cystoscopy suite and placed on the fluoroscopy table in the supine position. Anesthesia was administered.  When the patient was adequately sedated he was placed in the dorsal lithotomy position and prepped and draped in the usual sterile fashion.  Time out was performed, tg-procedural antibiotics were confirmed.      A 22 Afghan revolix laser scope was introduced into the bladder per urethra. trilobar hyperplasia was seen. Formal cystoscopy was performed which revealed no tumors or lesions suspicious for malignancy, no bladder stones, no bladder diverticuli, and no trabeculations.  The right and left ureteral orifices were visualized in the normal anatomic position and clear efflux of urine seen bilaterally.     Our attention was first turned to ablating the median lobe of the prostate. This was accomplished with a 800 micro Quanta laser fiber set at 120 morales. An incision in the prostate was made with the laser fiber at the bladder neck at the 5 o'clock position and brought just  proximal to the verumontanum to the depth of the prostatic capsule. A right counter incision was made in the prostate at the 7 o'clock position and brought to just proximal of the verumontanum. The median lobe was then ablated by incising between the two previously mentioned counterincisions. The median lobe was then vaporized at the base.     The two lateral lobes were then vaporized superficially to deep in a stepwise fashion. This was repeated from the 10' o'clock position to just proximal to the veru. After all hyperplasia had been vaporized the bladder was drained. A good channel was seen. All bleeding was coagulated with the quanta laser and adequate hemostasis was obtained.      A motion wire was placed into the bladder and the scope was removed.     A 22 Fr Rich catheter was placed over the wire and the bladder was drained and irrigated.    The patient tolerated the procedure well and was transferred to the recovery room in stable condition.      Follow up care:  The patient will follow up next Wednesday for catheter removal.  He was given prescriptions for levsin, flomax, and norco.    Delfin Geiger MD

## 2019-01-02 ENCOUNTER — OFFICE VISIT (OUTPATIENT)
Dept: UROLOGY | Facility: CLINIC | Age: 69
End: 2019-01-02
Payer: MEDICARE

## 2019-01-02 ENCOUNTER — TELEPHONE (OUTPATIENT)
Dept: UROLOGY | Facility: CLINIC | Age: 69
End: 2019-01-02

## 2019-01-02 VITALS
SYSTOLIC BLOOD PRESSURE: 150 MMHG | HEIGHT: 71 IN | DIASTOLIC BLOOD PRESSURE: 95 MMHG | WEIGHT: 249.13 LBS | BODY MASS INDEX: 34.88 KG/M2 | HEART RATE: 98 BPM

## 2019-01-02 DIAGNOSIS — N13.8 BPH WITH OBSTRUCTION/LOWER URINARY TRACT SYMPTOMS: ICD-10-CM

## 2019-01-02 DIAGNOSIS — Z46.6 ENCOUNTER FOR FOLEY CATHETER REMOVAL: Primary | ICD-10-CM

## 2019-01-02 DIAGNOSIS — N40.1 BPH WITH OBSTRUCTION/LOWER URINARY TRACT SYMPTOMS: ICD-10-CM

## 2019-01-02 PROCEDURE — 99024 POSTOP FOLLOW-UP VISIT: CPT | Mod: POP,,, | Performed by: PHYSICIAN ASSISTANT

## 2019-01-02 PROCEDURE — 51700 PR IRRIGATION, BLADDER: ICD-10-PCS | Mod: S$PBB,58,, | Performed by: PHYSICIAN ASSISTANT

## 2019-01-02 PROCEDURE — 99999 PR PBB SHADOW E&M-EST. PATIENT-LVL V: ICD-10-PCS | Mod: PBBFAC,,, | Performed by: PHYSICIAN ASSISTANT

## 2019-01-02 PROCEDURE — 99999 PR PBB SHADOW E&M-EST. PATIENT-LVL V: CPT | Mod: PBBFAC,,, | Performed by: PHYSICIAN ASSISTANT

## 2019-01-02 PROCEDURE — 51700 IRRIGATION OF BLADDER: CPT | Mod: PBBFAC | Performed by: PHYSICIAN ASSISTANT

## 2019-01-02 PROCEDURE — 51700 IRRIGATION OF BLADDER: CPT | Mod: S$PBB,58,, | Performed by: PHYSICIAN ASSISTANT

## 2019-01-02 PROCEDURE — 99215 OFFICE O/P EST HI 40 MIN: CPT | Mod: PBBFAC | Performed by: PHYSICIAN ASSISTANT

## 2019-01-02 PROCEDURE — 99024 PR POST-OP FOLLOW-UP VISIT: ICD-10-PCS | Mod: POP,,, | Performed by: PHYSICIAN ASSISTANT

## 2019-01-02 NOTE — PROGRESS NOTES
CHIEF COMPLAINT:    Mr. Arevalo is a 68 y.o. male presenting for voiding trial.  PRESENTING ILLNESS:    Caesar Arevalo is a 68 y.o. male with a PMH of BPH who presents for voiding trial.      He had the following procedure done on 12/28/18:    Procedure(s) Performed:   1.  Laser vaporization of the prostate   Specimen(s): none   Staff Surgeon: Herman Johnson MD   Findings:   Regrowth of adenoma with trilobar enlargement  Lobes vaporized, good hemostasis and open channel  Wide mouthed short segment stricture in bulbar urethra, easily passed through with cystoscope  Drains: 22 Fr maurice catheter     His catheter has been draining well.  He reports pain at the tip of the penis.  He reports experiencing bladder spasms and took hyoscyamine.  He denies fevers and chills.         Negative for fever and chills.     PATIENT HISTORY:    Past Medical History:   Diagnosis Date    Bell's palsy     1980's    BPH (benign prostatic hyperplasia)     Diabetes mellitus, type 2     Hypertension     TB lung, latent 1980    s/p treatment       Past Surgical History:   Procedure Laterality Date    COLONOSCOPY N/A 7/25/2016    Performed by RICARDO Mclain MD at [facility] ENDO (4TH FLR)    DISKECTOMY AND FUSION-ANTERIOR CERVICAL (ACDF) N/A 10/11/2016    Performed by Serena Zelaya MD at [facility] OR 2ND FLR    HERNIA REPAIR  04/2016    [facility]    KNEE LIGAMENT RECONSTRUCTION      plantar warts      PROSTATE SURGERY      REPAIR-HERNIA-UMBILICAL N/A 4/7/2016    Performed by Marc Agarwal MD at [facility] OR 2ND FLR    TURP, USING LASER N/A 12/28/2018    Performed by Herman Johnson Jr., MD at [facility] OR 1ST FLR       Family History   Problem Relation Age of Onset    Diabetes Mother     Hypertension Mother     Heart disease Mother     Diabetes Sister     Cancer Neg Hx     Stroke Neg Hx     Melanoma Neg Hx     Psoriasis Neg Hx     Lupus Neg Hx        Social History     Socioeconomic History    Marital status:      Spouse  name: Not on file    Number of children: 4    Years of education: Not on file    Highest education level: Not on file   Social Needs    Financial resource strain: Not on file    Food insecurity - worry: Not on file    Food insecurity - inability: Not on file    Transportation needs - medical: Not on file    Transportation needs - non-medical: Not on file   Occupational History    Occupation: retired [employer]   Tobacco Use    Smoking status: Former Smoker     Packs/day: 1.00     Years: 30.00     Pack years: 30.00     Last attempt to quit: 2011     Years since quittin.2    Smokeless tobacco: Never Used   Substance and Sexual Activity    Alcohol use: Yes     Alcohol/week: 0.0 oz     Comment: occasionally-     Drug use: No    Sexual activity: Yes     Partners: Female   Other Topics Concern    Not on file   Social History Narrative    Not on file       Allergies:  Patient has no known allergies.    Medications:    Current Outpatient Medications:     amLODIPine (NORVASC) 10 MG tablet, TAKE 1 TABLET EVERY MORNING, Disp: 90 tablet, Rfl: 1    aspirin (ECOTRIN) 81 MG EC tablet, Take 81 mg by mouth once daily., Disp: , Rfl:     atorvastatin (LIPITOR) 20 MG tablet, TAKE 1 TABLET EVERY MORNING, Disp: 90 tablet, Rfl: 1    blood sugar diagnostic Strp, 1 strip by Misc.(Non-Drug; Combo Route) route 3 (three) times daily. freestyle, Disp: 300 strip, Rfl: 6    blood-glucose meter (FREESTYLE SYSTEM KIT) kit, Use as instructed, Disp: 1 each, Rfl: 0    cholecalciferol, vitamin D3, (VITAMIN D3) 1,000 unit capsule, Take 1,000 Units by mouth once daily., Disp: , Rfl:     clindamycin (CLEOCIN T) 1 % external solution, Apply to scalp BID prn bumps, Disp: 60 mL, Rfl: 1    diclofenac sodium 1 % Gel, Apply 2 g topically once daily., Disp: 100 g, Rfl: 0    HYDROcodone-acetaminophen (NORCO) 5-325 mg per tablet, Take 1 tablet by mouth every 6 (six) hours as needed for Pain., Disp: 7 tablet, Rfl: 0     hyoscyamine (ANASPAZ,LEVSIN) 0.125 mg Tab, Take 1 tablet (125 mcg total) by mouth every 4 (four) hours as needed (bladder spasm)., Disp: 30 tablet, Rfl: 0    ketoconazole (NIZORAL) 2 % shampoo, Wash hair with medicated shampoo at least 2x/week - let sit on scalp at least 5 minutes prior to rinsing, Disp: 120 mL, Rfl: 5    lancets (FREESTYLE LANCETS) 28 gauge Misc, 1 lancet by Other route 3 (three) times daily., Disp: 100 each, Rfl: 5    linagliptin (TRADJENTA) 5 mg Tab tablet, Take 1 tablet (5 mg total) by mouth once daily., Disp: 90 tablet, Rfl: 1    lisinopril (PRINIVIL,ZESTRIL) 40 MG tablet, Take 1 tablet (40 mg total) by mouth once daily., Disp: 90 tablet, Rfl: 1    loratadine (CLARITIN) 10 mg tablet, TAKE 1 TABLET DAILY, Disp: 90 tablet, Rfl: 1    metFORMIN (GLUCOPHAGE) 1000 MG tablet, Take 1 tablet (1,000 mg total) by mouth 2 (two) times daily with meals., Disp: 180 tablet, Rfl: 1    multivit-min-folic-vit K-lycop (ONE-A-DAY MEN'S 50 PLUS) 400- mcg Tab, Take by mouth., Disp: , Rfl:     mupirocin (BACTROBAN) 2 % ointment, Apply topically 3 (three) times daily., Disp: 30 g, Rfl: 0    naftifine 2 % Gel, Apply 1 application topically once daily., Disp: 60 g, Rfl: 2    omega-3 fatty acids (FISH OIL) 300 mg Cap, Take 300 mg by mouth., Disp: , Rfl:     tamsulosin (FLOMAX) 0.4 mg Cap, Take 1 capsule (0.4 mg total) by mouth once daily., Disp: 30 capsule, Rfl: 0    Current Facility-Administered Medications:     lidocaine HCL 2% jelly, , Topical (Top), Once, Mary Onofre, NP    lidocaine HCL 2% jelly, , Topical (Top), Once, Mary Onofre, NP    lidocaine HCL 2% jelly, , Topical (Top), Once, Herman Johnson Jr., MD    PHYSICAL EXAMINATION:    Constitutional: He appears well-developed and well-nourished.  He is in no apparent distress.    Eyes: No scleral icterus noted bilaterally. No discharge bilaterally.    Nose: No rhinorrhea    Cardiovascular: Normal rate.     Pulmonary/Chest: Effort  normal. No respiratory distress.     Abdominal:  He exhibits no distension.      Neurological: He is alert and oriented to person, place, and time.     Skin: Skin is warm and dry.     Psych: Cooperative with normal affect.      Physical Exam    Yellow urine noted in leg bag.    LABS:      Lab Results   Component Value Date    PSADIAG 0.73 10/11/2018       IMPRESSION:    Encounter Diagnoses   Name Primary?    Encounter for Maurice catheter removal Yes    BPH with obstruction/lower urinary tract symptoms          PLAN:    Voiding trial performed by Nurse Pratt.  120ml of sterile water was instilled into bladder.  Maurice catheter was removed. Patient urinated 125ml    Voiding trial passed  Patient was instructed to drink plenty of fluids today.  Instructed patient to call at 2p.m. to give an update on urine output.  I instructed patient to return to clinic or emergency department (if after clinic hours) to have maurice catheter put back in if unable to urinate within 5 hours of maurice catheter removal or starts to experience bladder pressure/pain, decrease flow, straining/difficulty urinating.    Patient voiced understanding.     Stop levsin,  Continue flomax.      He will be contacted with a follow up appointment with Dr. Johnson.        Radha Cason PA-C

## 2019-01-28 ENCOUNTER — PATIENT MESSAGE (OUTPATIENT)
Dept: UROLOGY | Facility: CLINIC | Age: 69
End: 2019-01-28

## 2019-01-28 ENCOUNTER — OFFICE VISIT (OUTPATIENT)
Dept: UROLOGY | Facility: CLINIC | Age: 69
End: 2019-01-28
Payer: MEDICARE

## 2019-01-28 VITALS
HEIGHT: 71 IN | SYSTOLIC BLOOD PRESSURE: 125 MMHG | DIASTOLIC BLOOD PRESSURE: 86 MMHG | HEART RATE: 125 BPM | WEIGHT: 247.56 LBS | BODY MASS INDEX: 34.66 KG/M2

## 2019-01-28 DIAGNOSIS — R35.0 URINARY FREQUENCY: Primary | ICD-10-CM

## 2019-01-28 PROCEDURE — 99024 POSTOP FOLLOW-UP VISIT: CPT | Mod: POP,,, | Performed by: UROLOGY

## 2019-01-28 PROCEDURE — 99024 PR POST-OP FOLLOW-UP VISIT: ICD-10-PCS | Mod: POP,,, | Performed by: UROLOGY

## 2019-01-28 PROCEDURE — 99214 OFFICE O/P EST MOD 30 MIN: CPT | Mod: PBBFAC | Performed by: UROLOGY

## 2019-01-28 PROCEDURE — 99999 PR PBB SHADOW E&M-EST. PATIENT-LVL IV: ICD-10-PCS | Mod: PBBFAC,,, | Performed by: UROLOGY

## 2019-01-28 PROCEDURE — 99999 PR PBB SHADOW E&M-EST. PATIENT-LVL IV: CPT | Mod: PBBFAC,,, | Performed by: UROLOGY

## 2019-01-28 NOTE — PROGRESS NOTES
Subjective:       Patient ID: Caesar Arevalo is a 68 y.o. male.    Chief Complaint: s/p turp (1month follow after s/p turp surgery pt state on last night he voided every hour , and he state he had a fever .)    HPI    Caesar Arevalo is a 68 y.o. male s/p laser TURP on 12/28/18 here for a post-op evaluation. Notes last night he was experiencing nocturia qh and has been taking cough medicine. Also has been experiencing urinary urgency. He was unable to provide a urine sample today.    Past Medical History:   Diagnosis Date    Bell's palsy     1980's    BPH (benign prostatic hyperplasia)     Diabetes mellitus, type 2     Hypertension     TB lung, latent 1980    s/p treatment       Past Surgical History:   Procedure Laterality Date    COLONOSCOPY N/A 7/25/2016    Performed by [facility]ARDO [facility]clain MD at [facility] ENDO (4TH FLR)    DISKECTOMY AND FUSION-ANTERIOR CERVICAL (ACDF) N/A 10/11/2016    Performed by Serena Zelaya MD at [facility] OR 2ND FLR    HERNIA REPAIR  04/2016    norris    KNEE LIGAMENT RECONSTRUCTION      plantar warts      PROSTATE SURGERY      REPAIR-HERNIA-UMBILICAL N/A 4/7/2016    Performed by Marc Agarwal MD at [facility] OR 2ND FLR    TURP, USING LASER N/A 12/28/2018    Performed by Herman Johnson Jr., MD at [facility] OR 1ST FLR       Family History   Problem Relation Age of Onset    Diabetes Mother     Hypertension Mother     Heart disease Mother     Diabetes Sister     Cancer Neg Hx     Stroke Neg Hx     Melanoma Neg Hx     Psoriasis Neg Hx     Lupus Neg Hx        Social History     Socioeconomic History    Marital status:      Spouse name: Not on file    Number of children: 4    Years of education: Not on file    Highest education level: Not on file   Social Needs    Financial resource strain: Not on file    Food insecurity - worry: Not on file    Food insecurity - inability: Not on file    Transportation needs - medical: Not on file    Transportation needs -  non-medical: Not on file   Occupational History    Occupation: retired marine [employer]   Tobacco Use    Smoking status: Former Smoker     Packs/day: 1.00     Years: 30.00     Pack years: 30.00     Last attempt to quit: 2011     Years since quittin.3    Smokeless tobacco: Never Used   Substance and Sexual Activity    Alcohol use: Yes     Alcohol/week: 0.0 oz     Comment: occasionally-     Drug use: No    Sexual activity: Yes     Partners: Female   Other Topics Concern    Not on file   Social History Narrative    Not on file       Allergies:  Patient has no known allergies.    Medications:    Current Outpatient Medications:     amLODIPine (NORVASC) 10 MG tablet, TAKE 1 TABLET EVERY MORNING, Disp: 90 tablet, Rfl: 1    aspirin (ECOTRIN) 81 MG EC tablet, Take 81 mg by mouth once daily., Disp: , Rfl:     atorvastatin (LIPITOR) 20 MG tablet, TAKE 1 TABLET EVERY MORNING, Disp: 90 tablet, Rfl: 1    blood sugar diagnostic Strp, 1 strip by Misc.(Non-Drug; Combo Route) route 3 (three) times daily. freestyle, Disp: 300 strip, Rfl: 6    cholecalciferol, vitamin D3, (VITAMIN D3) 1,000 unit capsule, Take 1,000 Units by mouth once daily., Disp: , Rfl:     diclofenac sodium 1 % Gel, Apply 2 g topically once daily., Disp: 100 g, Rfl: 0    ketoconazole (NIZORAL) 2 % shampoo, Wash hair with medicated shampoo at least 2x/week - let sit on scalp at least 5 minutes prior to rinsing, Disp: 120 mL, Rfl: 5    lancets (FREESTYLE LANCETS) 28 gauge Misc, 1 lancet by Other route 3 (three) times daily., Disp: 100 each, Rfl: 5    linagliptin (TRADJENTA) 5 mg Tab tablet, Take 1 tablet (5 mg total) by mouth once daily., Disp: 90 tablet, Rfl: 1    lisinopril (PRINIVIL,ZESTRIL) 40 MG tablet, Take 1 tablet (40 mg total) by mouth once daily., Disp: 90 tablet, Rfl: 1    loratadine (CLARITIN) 10 mg tablet, TAKE 1 TABLET DAILY, Disp: 90 tablet, Rfl: 1    metFORMIN (GLUCOPHAGE) 1000 MG tablet, Take 1 tablet (1,000 mg total) by mouth  2 (two) times daily with meals., Disp: 180 tablet, Rfl: 1    multivit-min-folic-vit K-lycop (ONE-A-DAY MEN'S 50 PLUS) 400- mcg Tab, Take by mouth., Disp: , Rfl:     mupirocin (BACTROBAN) 2 % ointment, Apply topically 3 (three) times daily., Disp: 30 g, Rfl: 0    naftifine 2 % Gel, Apply 1 application topically once daily., Disp: 60 g, Rfl: 2    omega-3 fatty acids (FISH OIL) 300 mg Cap, Take 300 mg by mouth., Disp: , Rfl:     blood-glucose meter (FREESTYLE SYSTEM KIT) kit, Use as instructed, Disp: 1 each, Rfl: 0    clindamycin (CLEOCIN T) 1 % external solution, Apply to scalp BID prn bumps, Disp: 60 mL, Rfl: 1    hyoscyamine (ANASPAZ,LEVSIN) 0.125 mg Tab, Take 1 tablet (125 mcg total) by mouth every 4 (four) hours as needed (bladder spasm)., Disp: 30 tablet, Rfl: 0    tamsulosin (FLOMAX) 0.4 mg Cap, Take 1 capsule (0.4 mg total) by mouth once daily., Disp: 30 capsule, Rfl: 0  No current facility-administered medications for this visit.     Review of Systems   Constitutional: Negative for activity change, appetite change, chills, diaphoresis, fatigue, fever and unexpected weight change.   HENT: Negative for congestion, dental problem, hearing loss, mouth sores, postnasal drip, rhinorrhea, sinus pressure and trouble swallowing.    Eyes: Negative for pain, discharge and itching.   Respiratory: Negative for apnea, cough, choking, chest tightness, shortness of breath and wheezing.    Cardiovascular: Negative for chest pain, palpitations and leg swelling.   Gastrointestinal: Negative for abdominal distention, abdominal pain, anal bleeding, blood in stool, constipation, diarrhea, nausea, rectal pain and vomiting.   Endocrine: Negative for polydipsia and polyuria.   Genitourinary: Positive for urgency. Negative for decreased urine volume, difficulty urinating, discharge, dysuria, enuresis, flank pain, frequency, genital sores, hematuria, penile pain, penile swelling and scrotal swelling.        Nocturia qh.    Musculoskeletal: Negative for arthralgias, back pain and myalgias.   Skin: Negative for color change, rash and wound.   Neurological: Negative for dizziness, syncope, speech difficulty, light-headedness and headaches.   Hematological: Negative for adenopathy. Does not bruise/bleed easily.   Psychiatric/Behavioral: Negative for behavioral problems, confusion and sleep disturbance.       Objective:      Physical Exam   Constitutional: He appears well-developed.   HENT:   Head: Normocephalic.   Neck: Neck supple.   Cardiovascular: Normal rate.    Pulmonary/Chest: Effort normal.   Abdominal: Soft.   Genitourinary:   Genitourinary Comments: PVR is 41 cc.   Neurological: He is alert.   Skin: Skin is warm.     Psychiatric: He has a normal mood and affect.       Procedures:    Date: 12/28/2018  Procedure(s) Performed:   1.  Laser vaporization of the prostate  Findings:   Regrowth of adenoma with trilobar enlargement  Lobes vaporized, good hemostasis and open channel  Wide mouthed short segment stricture in bulbar urethra, easily passed through with cystoscope    Assessment:       1. Urinary frequency        Plan:       Caesar was seen today for s/p turp.    Diagnoses and all orders for this visit:    Urinary frequency          Patient will bring in a urine sample to clinic.  Will send urine culture and phone review results with patient.  Discussed benefits and possible side effects of ditropan.  Start ditropan.  RTC 3 months or sooner prn.    I, Buck Mcmillan, am acting as a scribe on this patient encounter in the presence and under the supervision of Dr. Johnson.    01/28/2019 9:35 AM    I, Dr. Johnson, personally performed the services described in this documentation.   All medical record entries made by the scribe were at my direction and in my presence.   I have reviewed the chart and agree that the record is accurate and complete.   Herman Johnson MD.  9:41 AM 01/28/2019     Ur cul  Blanco ua  Start ditropan if sxs persist

## 2019-02-06 DIAGNOSIS — E11.9 TYPE 2 DIABETES MELLITUS WITHOUT COMPLICATION, WITHOUT LONG-TERM CURRENT USE OF INSULIN: ICD-10-CM

## 2019-02-06 RX ORDER — BLOOD-GLUCOSE METER
KIT MISCELLANEOUS
Qty: 200 EACH | Refills: 6 | Status: SHIPPED | OUTPATIENT
Start: 2019-02-06

## 2019-02-21 ENCOUNTER — TELEPHONE (OUTPATIENT)
Dept: NEUROSURGERY | Facility: CLINIC | Age: 69
End: 2019-02-21

## 2019-02-21 DIAGNOSIS — M47.22 OSTEOARTHRITIS OF SPINE WITH RADICULOPATHY, CERVICAL REGION: Primary | ICD-10-CM

## 2019-02-21 DIAGNOSIS — M54.12 CERVICAL RADICULOPATHY: ICD-10-CM

## 2019-02-22 ENCOUNTER — PATIENT MESSAGE (OUTPATIENT)
Dept: INTERNAL MEDICINE | Facility: CLINIC | Age: 69
End: 2019-02-22

## 2019-02-22 DIAGNOSIS — E11.9 TYPE 2 DIABETES MELLITUS WITHOUT COMPLICATION, WITHOUT LONG-TERM CURRENT USE OF INSULIN: ICD-10-CM

## 2019-02-22 DIAGNOSIS — E11.40 TYPE 2 DIABETES MELLITUS WITH DIABETIC NEUROPATHY, WITHOUT LONG-TERM CURRENT USE OF INSULIN: ICD-10-CM

## 2019-02-22 RX ORDER — LANCETS 28 GAUGE
1 EACH MISCELLANEOUS 3 TIMES DAILY
Qty: 100 EACH | Refills: 5 | Status: SHIPPED | OUTPATIENT
Start: 2019-02-22 | End: 2019-03-21 | Stop reason: SDUPTHER

## 2019-02-22 NOTE — TELEPHONE ENCOUNTER
Called pt twice phone kept hanging up. Sent my chart message informing pt prescription was sent into pharmacy.

## 2019-02-26 DIAGNOSIS — I10 ESSENTIAL HYPERTENSION: ICD-10-CM

## 2019-02-26 DIAGNOSIS — E11.40 TYPE 2 DIABETES MELLITUS WITH DIABETIC NEUROPATHY, WITHOUT LONG-TERM CURRENT USE OF INSULIN: Primary | ICD-10-CM

## 2019-02-26 DIAGNOSIS — E55.9 VITAMIN D DEFICIENCY: ICD-10-CM

## 2019-02-26 DIAGNOSIS — E78.5 HYPERLIPIDEMIA, UNSPECIFIED HYPERLIPIDEMIA TYPE: ICD-10-CM

## 2019-02-26 RX ORDER — LISINOPRIL 40 MG/1
TABLET ORAL
Qty: 90 TABLET | Refills: 1 | OUTPATIENT
Start: 2019-02-26

## 2019-02-26 RX ORDER — IRBESARTAN 75 MG/1
75 TABLET ORAL NIGHTLY
Qty: 90 TABLET | Refills: 3 | Status: SHIPPED | OUTPATIENT
Start: 2019-02-26 | End: 2020-02-26

## 2019-02-26 NOTE — TELEPHONE ENCOUNTER
Please let him know that we are stopping lisinopril secondary to a link with lung cancer.  I will start a new medication called irbesartan for him. Please find out if he is willing to sign up for digital hypertension.  Please have him come in for follow-up as soon as possible.  We can do a blood pressure check at that time if it is in the next 2 weeks.  If not please set him up for follow-up with me and a blood pressure check in 2 weeks. Please set up labs before next visit.

## 2019-02-26 NOTE — TELEPHONE ENCOUNTER
Informed pt that Dr. Yan is stopping the lisinopril. Pt verbalized understanding. Pt will start the irbesartan. schedule pt for nurse visit on 3/21/19 and f/u with Dr. Yan on 4/9/19. Pt has no further questions or concerns.

## 2019-02-27 ENCOUNTER — PATIENT MESSAGE (OUTPATIENT)
Dept: INTERNAL MEDICINE | Facility: CLINIC | Age: 69
End: 2019-02-27

## 2019-02-28 ENCOUNTER — PATIENT MESSAGE (OUTPATIENT)
Dept: INTERNAL MEDICINE | Facility: CLINIC | Age: 69
End: 2019-02-28

## 2019-03-01 ENCOUNTER — TELEPHONE (OUTPATIENT)
Dept: FAMILY MEDICINE | Facility: CLINIC | Age: 69
End: 2019-03-01

## 2019-03-01 NOTE — TELEPHONE ENCOUNTER
----- Message from Estee Nunez sent at 3/1/2019 12:30 PM CST -----  Name of Who is Calling:SVETLANA HARRELL [[id number]]    What is the request in detail: Pt wants to know what medications is being changed.       Can the clinic reply by [facility]:   No       What Number [facility] Back [facility]:[phone]

## 2019-03-20 ENCOUNTER — OFFICE VISIT (OUTPATIENT)
Dept: NEUROSURGERY | Facility: CLINIC | Age: 69
End: 2019-03-20
Payer: MEDICARE

## 2019-03-20 ENCOUNTER — HOSPITAL ENCOUNTER (OUTPATIENT)
Dept: RADIOLOGY | Facility: HOSPITAL | Age: 69
Discharge: HOME | End: 2019-03-20
Attending: NEUROLOGICAL SURGERY
Payer: MEDICARE

## 2019-03-20 VITALS
BODY MASS INDEX: 34.34 KG/M2 | TEMPERATURE: 98 F | HEIGHT: 71 IN | HEART RATE: 100 BPM | WEIGHT: 245.31 LBS | DIASTOLIC BLOOD PRESSURE: 78 MMHG | SYSTOLIC BLOOD PRESSURE: 149 MMHG

## 2019-03-20 DIAGNOSIS — M54.12 CERVICAL RADICULOPATHY: ICD-10-CM

## 2019-03-20 DIAGNOSIS — M47.22 OSTEOARTHRITIS OF SPINE WITH RADICULOPATHY, CERVICAL REGION: ICD-10-CM

## 2019-03-20 DIAGNOSIS — M54.12 CERVICAL RADICULOPATHY: Primary | ICD-10-CM

## 2019-03-20 PROCEDURE — 99214 PR OFFICE/OUTPT VISIT, EST, LEVL IV, 30-39 MIN: ICD-10-PCS | Mod: S$PBB,,, | Performed by: PHYSICIAN ASSISTANT

## 2019-03-20 PROCEDURE — 72125 CT NECK SPINE W/O DYE: CPT | Mod: TC

## 2019-03-20 PROCEDURE — 99999 PR PBB SHADOW E&M-EST. PATIENT-LVL V: ICD-10-PCS | Mod: PBBFAC,,, | Performed by: PHYSICIAN ASSISTANT

## 2019-03-20 PROCEDURE — 72125 CT NECK SPINE W/O DYE: CPT | Mod: 26,,, | Performed by: RADIOLOGY

## 2019-03-20 PROCEDURE — 99215 OFFICE O/P EST HI 40 MIN: CPT | Mod: PBBFAC,25 | Performed by: PHYSICIAN ASSISTANT

## 2019-03-20 PROCEDURE — 99999 PR PBB SHADOW E&M-EST. PATIENT-LVL V: CPT | Mod: PBBFAC,,, | Performed by: PHYSICIAN ASSISTANT

## 2019-03-20 PROCEDURE — 72125 CT CERVICAL SPINE WITHOUT CONTRAST: ICD-10-PCS | Mod: 26,,, | Performed by: RADIOLOGY

## 2019-03-20 PROCEDURE — 99214 OFFICE O/P EST MOD 30 MIN: CPT | Mod: S$PBB,,, | Performed by: PHYSICIAN ASSISTANT

## 2019-03-20 NOTE — PROGRESS NOTES
[facility] - Neurosurgery 7th Fl  Neurosurgery    SUBJECTIVE:     History of Present Illness:  Caesar Arevalo is a 68 y.o. male who presents s/p ACDF in oct 2016 for nsgy follow up. Pt has done very well post operatively and has not required follow up since January 2018 with Dr. Zelaya. He presents today over concern for progressive LUE weakness, pain, and shoulder pain which began 4-5 months ago. He states he is unable to hold a pitcher of water due to weakness. He denies gait imbalance or rapid progression of symptoms.    Review of patient's allergies indicates:  No Known Allergies    Past Medical History:   Diagnosis Date    Bell's palsy     1980's    BPH (benign prostatic hyperplasia)     Diabetes mellitus, type 2     Hypertension     TB lung, latent 1980    s/p treatment       Past Surgical History:   Procedure Laterality Date    COLONOSCOPY N/A 7/25/2016    Performed by [facility]O [facility]clain MD at [facility] ENDO (4TH FLR)    DISKECTOMY AND FUSION-ANTERIOR CERVICAL (ACDF) N/A 10/11/2016    Performed by Serena Zelaya MD at [facility] OR 2ND FLR    HERNIA REPAIR  04/2016    norris    KNEE LIGAMENT RECONSTRUCTION      plantar warts      PROSTATE SURGERY      REPAIR-HERNIA-UMBILICAL N/A 4/7/2016    Performed by Marc Agarwal MD at [facility] OR 2ND FLR    TURP, USING LASER N/A 12/28/2018    Performed by Herman Johnson Jr., MD at [facility] OR 1ST FLR        Family History   Problem Relation Age of Onset    Diabetes Mother     Hypertension Mother     Heart disease Mother     Diabetes Sister     Cancer Neg Hx     Stroke Neg Hx     Melanoma Neg Hx     Psoriasis Neg Hx     Lupus Neg Hx        Social History     Socioeconomic History    Marital status:      Spouse name: Not on file    Number of children: 4    Years of education: Not on file    Highest education level: Not on file   Social Needs    Financial resource strain: Not on file    Food insecurity - worry: Not on file    Food insecurity -  "inability: Not on file    Transportation needs - medical: Not on file    Transportation needs - non-medical: Not on file   Occupational History    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 No changes in weight   Eyes: no visual changes   ENT: no nasal congestion or sore throat   Respiratory: no cough or shortness of breath   Cardiovascular: no chest pain or palpitations   Gastrointestinal: no nausea or vomiting   Genitourinary: no hematuria or dysuria   Integument/Breast: no rash or pruritis   Hematologic/Lymphatic: no easy bruising or lymphadenopathy   Musculoskeletal: no arthralgias or myalgias.   Neurological: no seizures or tremors   Behavioral/Psych: no auditory or visual hallucinations   Endocrine: no heat or cold intolerance     OBJECTIVE:     Vital Signs (Most Recent):  Vitals:    19 1317   BP: (!) 149/78   Pulse: 100   Temp: 98.1 °F (36.7 °C)   TempSrc: Oral   Weight: 111.3 kg (245 lb 4.8 oz)   Height: 5' 11" (1.803 m)       Physical Exam:  General: well developed, well nourished, no distress.   Head: normocephalic, atraumatic  Neurologic: Alert and oriented. Thought content appropriate.  GCS: Motor: 6/Verbal: 5/Eyes: 4 GCS Total: 15  Mental Status: Awake, Alert, Oriented x 4  Language: No aphasia  Speech: No dysarthria  Cranial nerves: face symmetric, tongue midline, CN II-XII grossly intact.   Eyes: pupils equal, round, reactive to light with accomodation, EOMI.  Pulmonary: normal " respirations, no signs of respiratory distress  Abdomen: soft, non-distended, not tender to palpation  Sensory: intact to light touch throughout    Motor Strength: Moves all extremities spontaneously with good tone.  Full strength upper and lower extremities. No abnormal movements seen.     Strength  Deltoids Triceps Biceps Wrist Extension Wrist Flexion Hand    Upper: R 5/5 5/5 5/5 5/5 5/5 5/5    L 5/5 4/5 4+/5 5/5 5/5 4/5     Iliopsoas Quadriceps Knee  Flexion Tibialis  anterior Gastro- cnemius EHL   Lower: R 5/5 5/5 5/5 5/5 5/5 5/5    L 5/5 5/5 5/5 5/5 5/5 5/5   L thumb opposition 4/5    DTR's: 2 + and symmetric in UE and LE  Guillen: absent  Clonus: absent  Pulses: 2+ and symmetric radial and dorsalis pedis.  Skin: Skin is warm, dry and intact.  Gait: normal Cervical ROM: full     Diagnostic Results:  CT C spine reviewed 03/20/2019 and shows complete fusion across C5-6 ACDF without hardware complication or appreciable encroachment of any fusion material into the foramen.    ASSESSMENT/PLAN:     Caesar Areavlo is a 68 y.o. male s/p C5-7 ACDF in 2016 who presents with signs and symptoms concerning for cervical radiculopathy and  myelopathy. His symptoms have not rapidly progressed. I do not feel that an MRI would offer much diagnostic information given probably artifact in the cervical spine. Will refer for cervical CT myelogram urgently. He should follow up in nsgy clinic once this is done.      Jayla Yanez PA-C  Neurosurgery

## 2019-03-20 NOTE — LETTER
March 20, 2019      Serena Zelaya MD  [address] [facility] [address]           [facility] - Neurosurgery 7th Fl  [address]  [facility] [address]  Phone: [phone]          Patient: Caesar Arevalo   MR Number: [id number]   YOB: 1950   Date of Visit: 3/20/2019       Dear Dr. Serena Zelaya:    Thank you for referring Caesar Arevalo to me for evaluation. Attached you will find relevant portions of my assessment and plan of care.    If you have questions, please do not hesitate to call me. I look forward to following Caesar Arevalo along with you.    Sincerely,    Jayla Yanez PA-C    Enclosure  CC:  No Recipients    If you would like to receive this communication electronically, please contact [email or web address] or [phone] to request more information on [employer] Link access.    For providers and/or their staff who would like to refer a patient to [facility], please contact us through our one-stop-shop provider referral line, [facility], at [phone].    If you feel you have received this communication in error or would no longer like to receive these types of communications, please e-mail [email or web address]

## 2019-03-21 ENCOUNTER — CLINICAL SUPPORT (OUTPATIENT)
Dept: INTERNAL MEDICINE | Facility: CLINIC | Age: 69
End: 2019-03-21
Payer: MEDICARE

## 2019-03-21 VITALS — DIASTOLIC BLOOD PRESSURE: 78 MMHG | HEART RATE: 86 BPM | SYSTOLIC BLOOD PRESSURE: 138 MMHG

## 2019-03-21 DIAGNOSIS — E11.40 TYPE 2 DIABETES MELLITUS WITH DIABETIC NEUROPATHY, WITHOUT LONG-TERM CURRENT USE OF INSULIN: ICD-10-CM

## 2019-03-21 DIAGNOSIS — E11.9 TYPE 2 DIABETES MELLITUS WITHOUT COMPLICATION, WITHOUT LONG-TERM CURRENT USE OF INSULIN: ICD-10-CM

## 2019-03-21 PROCEDURE — 99211 OFF/OP EST MAY X REQ PHY/QHP: CPT | Mod: PBBFAC,PN

## 2019-03-21 PROCEDURE — 99999 PR PBB SHADOW E&M-EST. PATIENT-LVL I: ICD-10-PCS | Mod: PBBFAC,,,

## 2019-03-21 PROCEDURE — 99999 PR PBB SHADOW E&M-EST. PATIENT-LVL I: CPT | Mod: PBBFAC,,,

## 2019-03-21 RX ORDER — LANCETS 28 GAUGE
1 EACH MISCELLANEOUS 3 TIMES DAILY
Qty: 200 EACH | Refills: 5 | Status: SHIPPED | OUTPATIENT
Start: 2019-03-21

## 2019-03-21 NOTE — TELEPHONE ENCOUNTER
Caesar Arevalo 68 y.o. male is here today for Blood Pressure check.   History of HTN yes.      Patient verifies taking blood pressure medications. Last dose of blood pressure medication was taken at 10 pm.     Does patient have record of home blood pressure readings no.     Patient is asymptomatic. Patient denies Headache, chest pain, blurred vision, left arm pain, right arm pain, leg pain, and/or swelling in the ankles or feet. Patient has no complaints today.     Blood pressure reading was 138/78, Pulse 86.     Pt did not bring Home blood pressure machine today to be checked.

## 2019-03-21 NOTE — PROGRESS NOTES
Caesar Arevalo 68 y.o. male is here today for Blood Pressure check.   History of HTN yes.      Patient verifies taking blood pressure medications. Last dose of blood pressure medication was taken at 10 pm.       Current Outpatient Medications:     amLODIPine (NORVASC) 10 MG tablet, TAKE 1 TABLET EVERY MORNING, Disp: 90 tablet, Rfl: 1    aspirin (ECOTRIN) 81 MG EC tablet, Take 81 mg by mouth once daily., Disp: , Rfl:     atorvastatin (LIPITOR) 20 MG tablet, TAKE 1 TABLET EVERY MORNING, Disp: 90 tablet, Rfl: 1    blood-glucose meter (FREESTYLE SYSTEM KIT) kit, Use as instructed, Disp: 1 each, Rfl: 0    cholecalciferol, vitamin D3, (VITAMIN D3) 1,000 unit capsule, Take 1,000 Units by mouth once daily., Disp: , Rfl:     clindamycin (CLEOCIN T) 1 % external solution, Apply to scalp BID prn bumps, Disp: 60 mL, Rfl: 1    diclofenac sodium 1 % Gel, Apply 2 g topically once daily., Disp: 100 g, Rfl: 0    FREESTYLE LITE STRIPS Strp, USE 1 STRIP THREE TIMES A DAY, Disp: 200 each, Rfl: 6    hyoscyamine (ANASPAZ,LEVSIN) 0.125 mg Tab, Take 1 tablet (125 mcg total) by mouth every 4 (four) hours as needed (bladder spasm)., Disp: 30 tablet, Rfl: 0    irbesartan (AVAPRO) 75 MG tablet, Take 1 tablet (75 mg total) by mouth every evening., Disp: 90 tablet, Rfl: 3    ketoconazole (NIZORAL) 2 % shampoo, Wash hair with medicated shampoo at least 2x/week - let sit on scalp at least 5 minutes prior to rinsing, Disp: 120 mL, Rfl: 5    lancets (FREESTYLE LANCETS) 28 gauge Misc, 1 lancet by Other route 3 (three) times daily., Disp: 100 each, Rfl: 5    linagliptin (TRADJENTA) 5 mg Tab tablet, Take 1 tablet (5 mg total) by mouth once daily., Disp: 90 tablet, Rfl: 1    loratadine (CLARITIN) 10 mg tablet, TAKE 1 TABLET DAILY, Disp: 90 tablet, Rfl: 1    metFORMIN (GLUCOPHAGE) 1000 MG tablet, Take 1 tablet (1,000 mg total) by mouth 2 (two) times daily with meals., Disp: 180 tablet, Rfl: 1    multivit-min-folic-vit K-lycop (ONE-A-DAY  MEN'S 50 PLUS) 400- mcg Tab, Take by mouth., Disp: , Rfl:     mupirocin (BACTROBAN) 2 % ointment, Apply topically 3 (three) times daily., Disp: 30 g, Rfl: 0    naftifine 2 % Gel, Apply 1 application topically once daily., Disp: 60 g, Rfl: 2    omega-3 fatty acids (FISH OIL) 300 mg Cap, Take 300 mg by mouth., Disp: , Rfl:     tamsulosin (FLOMAX) 0.4 mg Cap, Take 1 capsule (0.4 mg total) by mouth once daily., Disp: 30 capsule, Rfl: 0    Does patient have record of home blood pressure readings no.     Patient is asymptomatic. Patient denies Headache, chest pain, blurred vision, left arm pain, right arm pain, leg pain, and/or swelling in the ankles or feet. Patient has no complaints today.     Blood pressure reading was 138/78, Pulse 86.     Pt did not bring Home blood pressure machine today to be checked.     Review of patient's allergies indicates:  No Known Allergies  Creatinine   Date Value Ref Range Status   12/18/2018 1.5 (H) 0.5 - 1.4 mg/dL Final     Sodium   Date Value Ref Range Status   12/18/2018 138 136 - 145 mmol/L Final     Potassium   Date Value Ref Range Status   12/18/2018 4.2 3.5 - 5.1 mmol/L Final   ]    Dr. Yan notified.

## 2019-03-25 DIAGNOSIS — I10 ESSENTIAL HYPERTENSION: ICD-10-CM

## 2019-03-25 RX ORDER — AMLODIPINE BESYLATE 10 MG/1
TABLET ORAL
Qty: 90 TABLET | Refills: 1 | Status: SHIPPED | OUTPATIENT
Start: 2019-03-25

## 2019-03-26 ENCOUNTER — PATIENT OUTREACH (OUTPATIENT)
Dept: ADMINISTRATIVE | Facility: HOSPITAL | Age: 69
End: 2019-03-26

## 2019-04-08 ENCOUNTER — LAB VISIT (OUTPATIENT)
Dept: LAB | Facility: HOSPITAL | Age: 69
End: 2019-04-08
Attending: FAMILY MEDICINE
Payer: MEDICARE

## 2019-04-08 DIAGNOSIS — E78.5 HYPERLIPIDEMIA, UNSPECIFIED HYPERLIPIDEMIA TYPE: ICD-10-CM

## 2019-04-08 DIAGNOSIS — E55.9 VITAMIN D DEFICIENCY: ICD-10-CM

## 2019-04-08 DIAGNOSIS — I10 ESSENTIAL HYPERTENSION: ICD-10-CM

## 2019-04-08 DIAGNOSIS — E11.40 TYPE 2 DIABETES MELLITUS WITH DIABETIC NEUROPATHY, WITHOUT LONG-TERM CURRENT USE OF INSULIN: ICD-10-CM

## 2019-04-08 LAB
25(OH)D3+25(OH)D2 SERPL-MCNC: 51 NG/ML (ref 30–96)
ALBUMIN SERPL BCP-MCNC: 4.4 G/DL (ref 3.5–5.2)
ALP SERPL-CCNC: 73 U/L (ref 55–135)
ALT SERPL W/O P-5'-P-CCNC: 30 U/L (ref 10–44)
ANION GAP SERPL CALC-SCNC: 8 MMOL/L (ref 8–16)
AST SERPL-CCNC: 20 U/L (ref 10–40)
BILIRUB SERPL-MCNC: 0.6 MG/DL (ref 0.1–1)
BUN SERPL-MCNC: 16 MG/DL (ref 8–23)
CALCIUM SERPL-MCNC: 9.6 MG/DL (ref 8.7–10.5)
CHLORIDE SERPL-SCNC: 103 MMOL/L (ref 95–110)
CHOLEST SERPL-MCNC: 112 MG/DL (ref 120–199)
CHOLEST/HDLC SERPL: 2.8 {RATIO} (ref 2–5)
CO2 SERPL-SCNC: 26 MMOL/L (ref 23–29)
CREAT SERPL-MCNC: 1.3 MG/DL (ref 0.5–1.4)
EST. GFR  (AFRICAN AMERICAN): >60 ML/MIN/1.73 M^2
EST. GFR  (NON AFRICAN AMERICAN): 57 ML/MIN/1.73 M^2
ESTIMATED AVG GLUCOSE: 197 MG/DL (ref 68–131)
GLUCOSE SERPL-MCNC: 188 MG/DL (ref 70–110)
HBA1C MFR BLD HPLC: 8.5 % (ref 4–5.6)
HDLC SERPL-MCNC: 40 MG/DL (ref 40–75)
HDLC SERPL: 35.7 % (ref 20–50)
LDLC SERPL CALC-MCNC: 58.4 MG/DL (ref 63–159)
NONHDLC SERPL-MCNC: 72 MG/DL
POTASSIUM SERPL-SCNC: 4 MMOL/L (ref 3.5–5.1)
PROT SERPL-MCNC: 8.1 G/DL (ref 6–8.4)
SODIUM SERPL-SCNC: 137 MMOL/L (ref 136–145)
TRIGL SERPL-MCNC: 68 MG/DL (ref 30–150)
TSH SERPL DL<=0.005 MIU/L-ACNC: 0.94 UIU/ML (ref 0.4–4)

## 2019-04-08 PROCEDURE — 80061 LIPID PANEL: CPT

## 2019-04-08 PROCEDURE — 84443 ASSAY THYROID STIM HORMONE: CPT

## 2019-04-08 PROCEDURE — 82306 VITAMIN D 25 HYDROXY: CPT

## 2019-04-08 PROCEDURE — 83036 HEMOGLOBIN GLYCOSYLATED A1C: CPT

## 2019-04-08 PROCEDURE — 36415 COLL VENOUS BLD VENIPUNCTURE: CPT

## 2019-04-08 PROCEDURE — 80053 COMPREHEN METABOLIC PANEL: CPT

## 2019-04-09 ENCOUNTER — OFFICE VISIT (OUTPATIENT)
Dept: INTERNAL MEDICINE | Facility: CLINIC | Age: 69
End: 2019-04-09
Payer: MEDICARE

## 2019-04-09 VITALS
WEIGHT: 242.81 LBS | SYSTOLIC BLOOD PRESSURE: 122 MMHG | OXYGEN SATURATION: 96 % | DIASTOLIC BLOOD PRESSURE: 76 MMHG | HEART RATE: 89 BPM | BODY MASS INDEX: 33.99 KG/M2 | HEIGHT: 71 IN

## 2019-04-09 DIAGNOSIS — I10 ESSENTIAL HYPERTENSION: ICD-10-CM

## 2019-04-09 DIAGNOSIS — E01.0 THYROMEGALY: ICD-10-CM

## 2019-04-09 DIAGNOSIS — Z87.891 FORMER SMOKER: ICD-10-CM

## 2019-04-09 DIAGNOSIS — E78.5 HYPERLIPIDEMIA, UNSPECIFIED HYPERLIPIDEMIA TYPE: ICD-10-CM

## 2019-04-09 DIAGNOSIS — Z12.9 SCREENING FOR CANCER: ICD-10-CM

## 2019-04-09 DIAGNOSIS — M54.12 CERVICAL RADICULOPATHY: ICD-10-CM

## 2019-04-09 DIAGNOSIS — N40.1 BPH WITH OBSTRUCTION/LOWER URINARY TRACT SYMPTOMS: ICD-10-CM

## 2019-04-09 DIAGNOSIS — E11.40 TYPE 2 DIABETES MELLITUS WITH DIABETIC NEUROPATHY, WITHOUT LONG-TERM CURRENT USE OF INSULIN: Primary | ICD-10-CM

## 2019-04-09 DIAGNOSIS — N13.8 BPH WITH OBSTRUCTION/LOWER URINARY TRACT SYMPTOMS: ICD-10-CM

## 2019-04-09 PROCEDURE — 99213 OFFICE O/P EST LOW 20 MIN: CPT | Mod: PBBFAC,PN | Performed by: FAMILY MEDICINE

## 2019-04-09 PROCEDURE — 99999 PR PBB SHADOW E&M-EST. PATIENT-LVL III: ICD-10-PCS | Mod: PBBFAC,,, | Performed by: FAMILY MEDICINE

## 2019-04-09 PROCEDURE — 99214 PR OFFICE/OUTPT VISIT, EST, LEVL IV, 30-39 MIN: ICD-10-PCS | Mod: S$PBB,,, | Performed by: FAMILY MEDICINE

## 2019-04-09 PROCEDURE — 99214 OFFICE O/P EST MOD 30 MIN: CPT | Mod: S$PBB,,, | Performed by: FAMILY MEDICINE

## 2019-04-09 PROCEDURE — 99999 PR PBB SHADOW E&M-EST. PATIENT-LVL III: CPT | Mod: PBBFAC,,, | Performed by: FAMILY MEDICINE

## 2019-04-09 RX ORDER — ATORVASTATIN CALCIUM 20 MG/1
20 TABLET, FILM COATED ORAL EVERY MORNING
Qty: 90 TABLET | Refills: 1 | Status: SHIPPED | OUTPATIENT
Start: 2019-04-09

## 2019-04-09 RX ORDER — METFORMIN HYDROCHLORIDE 1000 MG/1
1000 TABLET ORAL 2 TIMES DAILY WITH MEALS
Qty: 180 TABLET | Refills: 1 | Status: SHIPPED | OUTPATIENT
Start: 2019-04-09

## 2019-04-09 NOTE — PROGRESS NOTES
"Subjective:       Patient ID: Caesar Arevalo is a 68 y.o. male.    Chief Complaint: Diabetes and Hypertension    HPI  67 y/o former smoker with DM2, HTN, BPH s/p Laser TURP 12/2018, Vit D def, CLBP, colon polyp (tubular adenoma) is here for follow up HTN.     He is not very active, but he is bowling. Denies f/body aches/n/v/d/constipation/sob/cp, he is still having frequency and urgency day and night.  Recent CT show thyroid enlargement. Sleeping ok, does not want to be re-evaluated in sleep clinic. Appetite ok.  R arm pain for a month, comes and goes, started during bowling, pain is over R elbow, cannot describe pain, 5/10, not sure the trigger, soft sleeve helps some, ice helps some    DM2: a1c 8.5 4/2019, he has been eating more candy lately, he is checking 3 times a day, low 120's high 180's, on statin, ace, trajenta, eye exam utd and foot exam utd  HTN: amlodipine 10 mg daily, Irbesartan 75 mg daily  BPH: Hx of prostate procedure, followed by urology, doing a little better on low dose flomax    Labs reviewed    Review of Systems   Constitutional: Negative for activity change, appetite change, fatigue and fever.   Respiratory: Negative for cough and shortness of breath.    Cardiovascular: Negative for chest pain, palpitations and leg swelling.   Gastrointestinal: Negative for constipation, diarrhea, nausea and vomiting.   Genitourinary: Positive for frequency and urgency. Negative for difficulty urinating and dysuria.   Skin: Negative for rash.   Neurological: Negative for dizziness and light-headedness.   Psychiatric/Behavioral: Negative for sleep disturbance.       Objective:      /76 (BP Location: Left arm, Patient Position: Sitting, BP Method: X-Large (Manual))   Pulse 89   Ht 5' 11" (1.803 m)   Wt 110.2 kg (242 lb 13.4 oz)   SpO2 96%   BMI 33.87 kg/m²   Physical Exam   Constitutional: He appears well-developed and well-nourished.   HENT:   Head: Normocephalic and atraumatic.   Mouth/Throat: No " oropharyngeal exudate.   Neck: Normal range of motion. Neck supple. No thyromegaly present.   Cardiovascular: Normal rate, regular rhythm and normal heart sounds.   Pulmonary/Chest: Effort normal and breath sounds normal. No respiratory distress.   Musculoskeletal: He exhibits no edema.   Lymphadenopathy:     He has no cervical adenopathy.   Neurological: He is alert.   Skin: Skin is warm and dry.   Psychiatric: He has a normal mood and affect.   Nursing note and vitals reviewed.      Assessment:       1. Type 2 diabetes mellitus with diabetic neuropathy, without long-term current use of insulin    2. Hyperlipidemia, unspecified hyperlipidemia type    3. Thyromegaly    4. Essential hypertension    5. BPH with obstruction/lower urinary tract symptoms    6. Cervical radiculopathy    7. Screening for cancer    8. Former smoker        Plan:   Caesar was seen today for diabetes and hypertension.    Diagnoses and all orders for this visit:    Type 2 diabetes mellitus with diabetic neuropathy, without long-term current use of insulin  -     metFORMIN (GLUCOPHAGE) 1000 MG tablet; Take 1 tablet (1,000 mg total) by mouth 2 (two) times daily with meals.  -     linagliptin (TRADJENTA) 5 mg Tab tablet; Take 1 tablet (5 mg total) by mouth once daily.  -     atorvastatin (LIPITOR) 20 MG tablet; Take 1 tablet (20 mg total) by mouth every morning.    Hyperlipidemia, unspecified hyperlipidemia type  -     atorvastatin (LIPITOR) 20 MG tablet; Take 1 tablet (20 mg total) by mouth every morning.    Thyromegaly  -     US Soft Tissue Head Neck Thyroid; Future    Essential hypertension    BPH with obstruction/lower urinary tract symptoms    Cervical radiculopathy    Screening for cancer  -     CT Chest Lung Screening Low Dose; Future    Former smoker  -     CT Chest Lung Screening Low Dose; Future

## 2019-04-17 ENCOUNTER — HOSPITAL ENCOUNTER (OUTPATIENT)
Dept: RADIOLOGY | Facility: HOSPITAL | Age: 69
Discharge: HOME | End: 2019-04-17
Attending: FAMILY MEDICINE
Payer: MEDICARE

## 2019-04-17 DIAGNOSIS — E01.0 THYROMEGALY: ICD-10-CM

## 2019-04-17 DIAGNOSIS — Z87.891 FORMER SMOKER: ICD-10-CM

## 2019-04-17 DIAGNOSIS — Z12.9 SCREENING FOR CANCER: ICD-10-CM

## 2019-04-17 PROCEDURE — G0297 LDCT FOR LUNG CA SCREEN: HCPCS | Mod: TC

## 2019-04-17 PROCEDURE — G0297 LDCT FOR LUNG CA SCREEN: HCPCS | Mod: 26,,, | Performed by: RADIOLOGY

## 2019-04-17 PROCEDURE — G0297 CT CHEST LUNG SCREENING LOW DOSE: ICD-10-PCS | Mod: 26,,, | Performed by: RADIOLOGY

## 2019-04-17 PROCEDURE — 76536 US EXAM OF HEAD AND NECK: CPT | Mod: TC

## 2019-04-17 PROCEDURE — 76536 US EXAM OF HEAD AND NECK: CPT | Mod: 26,,, | Performed by: RADIOLOGY

## 2019-04-17 PROCEDURE — 76536 US SOFT TISSUE HEAD NECK THYROID: ICD-10-PCS | Mod: 26,,, | Performed by: RADIOLOGY

## 2019-04-23 ENCOUNTER — TELEPHONE (OUTPATIENT)
Dept: INTERNAL MEDICINE | Facility: CLINIC | Age: 69
End: 2019-04-23

## 2019-04-23 DIAGNOSIS — E04.1 THYROID NODULE: ICD-10-CM

## 2019-04-23 DIAGNOSIS — R91.8 ABNORMAL CT LUNG SCREENING: ICD-10-CM

## 2019-04-24 NOTE — TELEPHONE ENCOUNTER
Informed pt of thyroid US and CT results. Pt verbalized understanding. Pt is schedule with pulmonary for 7/5/19. Gave pt the number to schedule with endocrine and sent message requesting appt.

## 2019-04-25 ENCOUNTER — OFFICE VISIT (OUTPATIENT)
Dept: UROLOGY | Facility: CLINIC | Age: 69
End: 2019-04-25
Payer: MEDICARE

## 2019-04-25 VITALS
HEART RATE: 94 BPM | HEIGHT: 71 IN | WEIGHT: 243.19 LBS | DIASTOLIC BLOOD PRESSURE: 96 MMHG | BODY MASS INDEX: 34.05 KG/M2 | SYSTOLIC BLOOD PRESSURE: 148 MMHG

## 2019-04-25 DIAGNOSIS — Z98.890 POST-OPERATIVE STATE: ICD-10-CM

## 2019-04-25 DIAGNOSIS — N52.9 ERECTILE DYSFUNCTION, UNSPECIFIED ERECTILE DYSFUNCTION TYPE: Primary | ICD-10-CM

## 2019-04-25 PROCEDURE — 99999 PR PBB SHADOW E&M-EST. PATIENT-LVL III: CPT | Mod: PBBFAC,,, | Performed by: UROLOGY

## 2019-04-25 PROCEDURE — 99213 OFFICE O/P EST LOW 20 MIN: CPT | Mod: PBBFAC | Performed by: UROLOGY

## 2019-04-25 PROCEDURE — 99214 OFFICE O/P EST MOD 30 MIN: CPT | Mod: S$PBB,,, | Performed by: UROLOGY

## 2019-04-25 PROCEDURE — 99999 PR PBB SHADOW E&M-EST. PATIENT-LVL III: ICD-10-PCS | Mod: PBBFAC,,, | Performed by: UROLOGY

## 2019-04-25 PROCEDURE — 99214 PR OFFICE/OUTPT VISIT, EST, LEVL IV, 30-39 MIN: ICD-10-PCS | Mod: S$PBB,,, | Performed by: UROLOGY

## 2019-04-25 RX ORDER — PAPAVERINE HYDROCHLORIDE 30 MG/ML
INJECTION INTRAMUSCULAR; INTRAVENOUS
Qty: 10 ML | Refills: 11 | Status: SHIPPED | OUTPATIENT
Start: 2019-04-25 | End: 2019-05-07

## 2019-04-25 RX ORDER — OXYBUTYNIN CHLORIDE 5 MG/5ML
5 SYRUP ORAL 2 TIMES DAILY
Qty: 90 ML | Refills: 11 | Status: SHIPPED | OUTPATIENT
Start: 2019-04-25 | End: 2020-04-24

## 2019-04-25 NOTE — PROGRESS NOTES
Subjective:       Patient ID: Caesar Arevalo is a 68 y.o. male.    Chief Complaint: Urinary Frequency    HPI    Caesar Arevalo is a 68 y.o. male s/p TURP on 12/28/18 here for management and evaluation of urinary frequency. He was last seen on 01/28/19 and discussed starting ditropan if symptoms persist. States he is voiding about every two hours, and more often in the morning. He experiences nocturia most nights and occasionally experiences post-void dribble. PMHx of DM and it has been elevated recently.     Past Medical History:   Diagnosis Date    Bell's palsy     1980's    BPH (benign prostatic hyperplasia)     Diabetes mellitus, type 2     Hypertension     TB lung, latent 1980    s/p treatment    Thyroid nodule 4/23/2019       Past Surgical History:   Procedure Laterality Date    COLONOSCOPY N/A 7/25/2016    Performed by R[facility]ARDO Mclain MD at [facility] ENDO (4TH FLR)    DISKECTOMY AND FUSION-ANTERIOR CERVICAL (ACDF) N/A 10/11/2016    Performed by Serena Zelaya MD at [facility] OR 2ND FLR    HERNIA REPAIR  04/2016    [facility]ris    KNEE LIGAMENT RECONSTRUCTION      plantar warts      PROSTATE SURGERY      REPAIR-HERNIA-UMBILICAL N/A 4/7/2016    Performed by Marc Agarwal MD at [facility] OR 2ND FLR    TURP, USING LASER N/A 12/28/2018    Performed by Herman Johnson Jr., MD at [facility] OR 1ST FLR       Family History   Problem Relation Age of Onset    Diabetes Mother     Hypertension Mother     Heart disease Mother     Diabetes Sister     Cancer Neg Hx     Stroke Neg Hx     Melanoma Neg Hx     Psoriasis Neg Hx     Lupus Neg Hx        Social History     Socioeconomic History    Marital status:      Spouse name: Not on file    Number of children: 4    Years of education: Not on file    Highest education level: Not on file   Occupational History    Occupation: [employer]   Social Needs    Financial resource strain: Not on file    Food insecurity:     Worry: Not on file     Inability:  Not on file    Transportation needs:     Medical: Not on file     Non-medical: Not on file   Tobacco Use    Smoking status: Former Smoker     Packs/day: 1.00     Years: 30.00     Pack years: 30.00     Last attempt to quit: 2011     Years since quittin.5    Smokeless tobacco: Never Used   Substance and Sexual Activity    Alcohol use: Yes     Alcohol/week: 0.0 oz     Comment: occasionally-     Drug use: No    Sexual activity: Yes     Partners: Female   Lifestyle    Physical activity:     Days per week: Not on file     Minutes per session: Not on file    Stress: Not on file   Relationships    Social connections:     Talks on phone: Not on file     Gets together: Not on file     Attends Jew service: Not on file     Active member of club or organization: Not on file     Attends meetings of clubs or organizations: Not on file     Relationship status: Not on file   Other Topics Concern    Not on file   Social History Narrative    Not on file       Allergies:  Patient has no known allergies.    Medications:    Current Outpatient Medications:     amLODIPine (NORVASC) 10 MG tablet, TAKE 1 TABLET EVERY MORNING, Disp: 90 tablet, Rfl: 1    aspirin (ECOTRIN) 81 MG EC tablet, Take 81 mg by mouth once daily., Disp: , Rfl:     atorvastatin (LIPITOR) 20 MG tablet, Take 1 tablet (20 mg total) by mouth every morning., Disp: 90 tablet, Rfl: 1    cholecalciferol, vitamin D3, (VITAMIN D3) 1,000 unit capsule, Take 1,000 Units by mouth once daily., Disp: , Rfl:     diclofenac sodium 1 % Gel, Apply 2 g topically once daily., Disp: 100 g, Rfl: 0    FREESTYLE LITE STRIPS Strp, USE 1 STRIP THREE TIMES A DAY, Disp: 200 each, Rfl: 6    irbesartan (AVAPRO) 75 MG tablet, Take 1 tablet (75 mg total) by mouth every evening., Disp: 90 tablet, Rfl: 3    lancets (FREESTYLE LANCETS) 28 gauge Misc, 1 lancet by Other route 3 (three) times daily., Disp: 200 each, Rfl: 5    linagliptin (TRADJENTA) 5 mg Tab tablet, Take 1  tablet (5 mg total) by mouth once daily., Disp: 90 tablet, Rfl: 1    loratadine (CLARITIN) 10 mg tablet, TAKE 1 TABLET DAILY, Disp: 90 tablet, Rfl: 1    metFORMIN (GLUCOPHAGE) 1000 MG tablet, Take 1 tablet (1,000 mg total) by mouth 2 (two) times daily with meals., Disp: 180 tablet, Rfl: 1    multivit-min-folic-vit K-lycop (ONE-A-DAY MEN'S 50 PLUS) 400- mcg Tab, Take by mouth., Disp: , Rfl:     naftifine 2 % Gel, Apply 1 application topically once daily., Disp: 60 g, Rfl: 2    omega-3 fatty acids (FISH OIL) 300 mg Cap, Take 300 mg by mouth., Disp: , Rfl:     blood-glucose meter (FREESTYLE SYSTEM KIT) kit, Use as instructed, Disp: 1 each, Rfl: 0    oxybutynin (DITROPAN) 5 mg/5 mL syrup, Take 5 mLs (5 mg total) by mouth 2 (two) times daily., Disp: 90 mL, Rfl: 11    papaverine 30 mg/mL injection, ADD: Phentolamine 10mg/ml ADD: PGE1 100 mcg  Sig: Inject as directed into lateral aspect of penis, Disp: 10 mL, Rfl: 11    Review of Systems   Constitutional: Negative for activity change, appetite change, chills, diaphoresis, fatigue, fever and unexpected weight change.   HENT: Negative for congestion, dental problem, hearing loss, mouth sores, postnasal drip, rhinorrhea, sinus pressure and trouble swallowing.    Eyes: Negative for pain, discharge and itching.   Respiratory: Negative for apnea, cough, choking, chest tightness, shortness of breath and wheezing.    Cardiovascular: Negative for chest pain, palpitations and leg swelling.   Gastrointestinal: Negative for abdominal distention, abdominal pain, anal bleeding, blood in stool, constipation, diarrhea, nausea, rectal pain and vomiting.   Endocrine: Negative for polydipsia and polyuria.   Genitourinary: Positive for frequency. Negative for decreased urine volume, difficulty urinating, discharge, dysuria, enuresis, flank pain, genital sores, hematuria, penile pain, penile swelling and scrotal swelling.        Positive for nocturia.   Musculoskeletal: Negative  for arthralgias, back pain and myalgias.   Skin: Negative for color change, rash and wound.   Neurological: Negative for dizziness, syncope, speech difficulty, light-headedness and headaches.   Hematological: Negative for adenopathy. Does not bruise/bleed easily.   Psychiatric/Behavioral: Negative for behavioral problems, confusion and sleep disturbance.       Objective:      Physical Exam   Constitutional: He appears well-developed.   HENT:   Head: Normocephalic.   Neck: Neck supple.   Cardiovascular: Normal rate.    Pulmonary/Chest: Effort normal.   Abdominal: Soft.   Neurological: He is alert.   Skin: Skin is warm.     Psychiatric: He has a normal mood and affect.       Procedures:     Date: 12/28/2018  Procedure(s) Performed:   1.  Laser vaporization of the prostate  Findings:   Regrowth of adenoma with trilobar enlargement  Lobes vaporized, good hemostasis and open channel  Wide mouthed short segment stricture in bulbar urethra, easily passed through with cystoscope    Assessment:       1. Erectile dysfunction, unspecified erectile dysfunction type    2. Post-operative state        Plan:       Caesar was seen today for urinary frequency.    Diagnoses and all orders for this visit:    Erectile dysfunction, unspecified erectile dysfunction type    Post-operative state    Other orders  -     papaverine 30 mg/mL injection; ADD: Phentolamine 10mg/ml  ADD: PGE1 100 mcg    Sig: Inject as directed into lateral aspect of penis  -     oxybutynin (DITROPAN) 5 mg/5 mL syrup; Take 5 mLs (5 mg total) by mouth 2 (two) times daily.          Recommended reducing fluid intake in the evening.  Recommended keeping DM under control.  Discussed benefits and possible side effects of ditropan.  Start ditropan.  Follow-up with Markie Pacheco to relearn pep injections.  RTC 2 months.    Buck SALGADO am acting as a scribe on this patient encounter in the presence and under the supervision of Dr. Johnson.    04/25/2019 9:51 AM    Dr. NAOMI  Alex, personally performed the services described in this documentation.   All medical record entries made by the scribe were at my direction and in my presence.   I have reviewed the chart and agree that the record is accurate and complete.   Herman Johnson MD.  10:00 AM 04/25/2019

## 2019-04-29 ENCOUNTER — PATIENT MESSAGE (OUTPATIENT)
Dept: UROLOGY | Facility: CLINIC | Age: 69
End: 2019-04-29

## 2019-05-01 ENCOUNTER — TELEPHONE (OUTPATIENT)
Dept: INTERNAL MEDICINE | Facility: CLINIC | Age: 69
End: 2019-05-01

## 2019-05-01 DIAGNOSIS — E11.40 TYPE 2 DIABETES MELLITUS WITH DIABETIC NEUROPATHY, WITHOUT LONG-TERM CURRENT USE OF INSULIN: Primary | ICD-10-CM

## 2019-05-01 NOTE — TELEPHONE ENCOUNTER
----- Message from Luis Geiger sent at 5/1/2019  9:56 AM CDT -----  Contact: SVETLANA HARRELL [[id number]]  Name of Who is Calling:SVETLANA HARRELL [[id number]]        What is the request in detail: Please add pt's A1C lab orders to chart. He at [facility] and [facility] waiting to have labs drawn.          Can the clinic reply by [facility]: N    What Number to Call Back if not in [facility]: [phone]

## 2019-05-06 NOTE — PROGRESS NOTES
CHIEF COMPLAINT:    Mr. Arevalo is a 68 y.o. male presenting for ICI re-education.    PRESENTING ILLNESS:    Caesar Arevalo is a 68 y.o. male w/ h/o DM, HTN, ED, BPH w/ obstruction, s/p TURP 12/28/18 w/ Dr. Johnson.    Last seen in clinic 4/25/19 w/ Dr. Johnson.    Today pt returns to clinic for ICI re-education per Dr. Johnson. He did not bring his Trimix w/ him today. Reports he did not realize he was supposed to  at pharmacy ([facility] for  DOD), reports his usual pharmacy does not compound Trimix. Reports h/o performing ICI more than 1 yr ago, he is somewhat familiar of how to inject Trimix. He recalls desired effects, however he does not recall what dose he was at. He had one episode of priapism in outside clinic once, where erection lasted for more 6 hrs. Reports has had ED for 10+ yrs. He attempted PDE5Is w/o desired effects.  He desires to return to ICI.    REVIEW OF SYSTEMS:    Caesar Arevalo denies headache, blurred vision, fever, nausea, vomiting, chills, abdominal pain, bleeding per rectum, cough, SOB, recent loss of consciousness, recent mental status changes, seizures, dizziness, or upper or lower extremity weakness.    FIDELIA  1. 1  2. 2  3. 2  4. 2  5. 2  Total: 9.    PATIENT HISTORY:    Past Medical History:   Diagnosis Date    Bell's palsy     1980's    BPH (benign prostatic hyperplasia)     Diabetes mellitus, type 2     Hypertension     TB lung, latent 1980    s/p treatment    Thyroid nodule 4/23/2019       Past Surgical History:   Procedure Laterality Date    COLONOSCOPY N/A 7/25/2016    Performed by [facility]O [facility]clain MD at [facility] ENDO (4TH FLR)    DISKECTOMY AND FUSION-ANTERIOR CERVICAL (ACDF) N/A 10/11/2016    Performed by Serena Zelaya MD at [facility] OR 2ND FLR    HERNIA REPAIR  04/2016    norris    KNEE LIGAMENT RECONSTRUCTION      plantar warts      PROSTATE SURGERY      REPAIR-HERNIA-UMBILICAL N/A 4/7/2016    Performed by Marc Agarwal MD at [facility] OR 2ND FLR     ANGEL, USING LASER N/A 2018    Performed by Herman Johnson Jr., MD at [facility] OR [address] [place] [address]       Family History   Problem Relation Age of Onset    Diabetes Mother     Hypertension Mother     Heart disease Mother     Diabetes Sister     Cancer Neg Hx     Stroke Neg Hx     Melanoma Neg Hx     Psoriasis Neg Hx     Lupus Neg Hx        Social History     Socioeconomic History    Marital status:      Spouse name: Not on file    Number of children: 4    Years of education: Not on file    Highest education level: Not on file   Occupational History    Occupation: retired [employer]   Social Needs    Financial resource strain: Not on file    Food insecurity:     Worry: Not on file     Inability: Not on file    Transportation needs:     Medical: Not on file     Non-medical: Not on file   Tobacco Use    Smoking status: Former Smoker     Packs/day: 1.00     Years: 30.00     Pack years: 30.00     Last attempt to quit: 2011     Years since quittin.6    Smokeless tobacco: Never Used   Substance and Sexual Activity    Alcohol use: Yes     Alcohol/week: 0.0 oz     Comment: occasionally-     Drug use: No    Sexual activity: Yes     Partners: Female   Lifestyle    Physical activity:     Days per week: Not on file     Minutes per session: Not on file    Stress: Not on file   Relationships    Social connections:     Talks on phone: Not on file     Gets together: Not on file     Attends Taoist service: Not on file     Active member of club or organization: Not on file     Attends meetings of clubs or organizations: Not on file     Relationship status: Not on file   Other Topics Concern    Not on file   Social History Narrative    Not on file       Allergies:  Patient has no known allergies.    Medications:    Current Outpatient Medications:     amLODIPine (NORVASC) 10 MG tablet, TAKE 1 TABLET EVERY MORNING, Disp: 90 tablet, Rfl: 1    aspirin (ECOTRIN) 81 MG EC tablet, Take 81 mg by mouth  once daily., Disp: , Rfl:     atorvastatin (LIPITOR) 20 MG tablet, Take 1 tablet (20 mg total) by mouth every morning., Disp: 90 tablet, Rfl: 1    blood-glucose meter (FREESTYLE SYSTEM KIT) kit, Use as instructed, Disp: 1 each, Rfl: 0    cholecalciferol, vitamin D3, (VITAMIN D3) 1,000 unit capsule, Take 1,000 Units by mouth once daily., Disp: , Rfl:     diclofenac sodium 1 % Gel, Apply 2 g topically once daily., Disp: 100 g, Rfl: 0    FREESTYLE LITE STRIPS Strp, USE 1 STRIP THREE TIMES A DAY, Disp: 200 each, Rfl: 6    irbesartan (AVAPRO) 75 MG tablet, Take 1 tablet (75 mg total) by mouth every evening., Disp: 90 tablet, Rfl: 3    lancets (FREESTYLE LANCETS) 28 gauge Misc, 1 lancet by Other route 3 (three) times daily., Disp: 200 each, Rfl: 5    linagliptin (TRADJENTA) 5 mg Tab tablet, Take 1 tablet (5 mg total) by mouth once daily., Disp: 90 tablet, Rfl: 1    loratadine (CLARITIN) 10 mg tablet, TAKE 1 TABLET DAILY, Disp: 90 tablet, Rfl: 1    metFORMIN (GLUCOPHAGE) 1000 MG tablet, Take 1 tablet (1,000 mg total) by mouth 2 (two) times daily with meals., Disp: 180 tablet, Rfl: 1    multivit-min-folic-vit K-lycop (ONE-A-DAY MEN'S 50 PLUS) 400- mcg Tab, Take by mouth., Disp: , Rfl:     naftifine 2 % Gel, Apply 1 application topically once daily., Disp: 60 g, Rfl: 2    omega-3 fatty acids (FISH OIL) 300 mg Cap, Take 300 mg by mouth., Disp: , Rfl:     oxybutynin (DITROPAN) 5 mg/5 mL syrup, Take 5 mLs (5 mg total) by mouth 2 (two) times daily., Disp: 90 mL, Rfl: 11    papaverine 30 mg/mL injection, Add: Phentolamine 1mg/mL. Add: PGE1 10mcg/mL., Disp: 5 mL, Rfl: 6    PHYSICAL EXAMINATION:    The patient generally appears in good health, is appropriately interactive, and is in no apparent distress.     Eyes: anicteric sclerae, moist conjunctivae; no lid-lag; PERRLA     HENT: Atraumatic; oropharynx clear with moist mucous membranes and no mucosal ulcerations;normal hard and soft palate.  No evidence of  lymphadenopathy.    Neck: Trachea midline.  No thyromegaly.    Musculoskeletal: No abnormal gait.    Skin: No lesions.    Mental: Cooperative with normal affect.  Is oriented to time, place, and person.    Neuro: Grossly intact.    Chest: Normal inspiratory effort.   No accessory muscles.  No audible wheezes.  Respirations symmetric on inspiration and expiration.    Heart: Regular rhythm.      Abdomen:  Soft, non-tender. No masses or organomegaly. Bladder is not palpable. No evidence of flank discomfort. No evidence of inguinal hernia.    Genitourinary: The penis is uncircumcised with no evidence of plaques or induration. The urethral meatus is normal. The testes, epididymides, and cord structures are normal in size and contour bilaterally. The scrotum is normal in size and contour.     Extremities: No clubbing, cyanosis, or edema    LABS:    Lab Results   Component Value Date    CREATININE 1.3 04/08/2019       Lab Results   Component Value Date    PSADIAG 0.73 10/11/2018     Hemoglobin A1C   Date Value Ref Range Status   04/08/2019 8.5 (H) 4.0 - 5.6 % Final     Comment:     ADA Screening Guidelines:  5.7-6.4%  Consistent with prediabetes  >or=6.5%  Consistent with diabetes  High levels of fetal hemoglobin interfere with the HbA1C  assay. Heterozygous hemoglobin variants (HbS, HgC, etc)do  not significantly interfere with this assay.   However, presence of multiple variants may affect accuracy.     09/14/2018 7.4 (H) 4.0 - 5.6 % Final     Comment:     ADA Screening Guidelines:  5.7-6.4%  Consistent with prediabetes  >or=6.5%  Consistent with diabetes  High levels of fetal hemoglobin interfere with the HbA1C  assay. Heterozygous hemoglobin variants (HbS, HgC, etc)do  not significantly interfere with this assay.   However, presence of multiple variants may affect accuracy.     08/08/2018 7.3 (H) 4.0 - 5.6 % Final     Comment:     ADA Screening Guidelines:  5.7-6.4%  Consistent with prediabetes  >or=6.5%  Consistent  with diabetes  High levels of fetal hemoglobin interfere with the HbA1C  assay. Heterozygous hemoglobin variants (HbS, HgC, etc)do  not significantly interfere with this assay.   However, presence of multiple variants may affect accuracy.       IMPRESSION:    Encounter Diagnoses   Name Primary?    BPH with obstruction/lower urinary tract symptoms Yes    Erectile dysfunction due to arterial insufficiency     Low libido        PLAN:    I spent 50 minutes with the patient of which more than half was spent in direct consultation with the patient in regards to our treatment and plan.    Discussed and reviewed condition. Reviewed anatomy and physiology of erectile function. Discussed possible etiologies that may contribute to erectile dysfunction.    Discussed and reviewed low libido, potential etiologies, including but not limited to depression, anxiety, stress, low testosterone, testosterone deficiency, thyroid dz. Discussed testosterone testing. Pt is interested in testosterone testing at this time. Discussed importance of obtaining AM testosterone (before 10am).    Discussed and reviewed intracavernosal injections using Trimix (PGE1/papaverine/phentolamine). Discussed and reviewed proper use and potential adverse effects (bleeding, pain, formation of scar tissue/nodules, development of penile curvature). Discussed Trimix is a compound medication and is only mixed at certain pharmacies known as a compound pharmacies. The medication has to be stored in the refrigerator. Improper storage decreases the effectiveness of the medication. New Rx writtent and sent to [facility] per pt request. Recommend starting w/ 10 units at home. Discussed the importance of drawing up the proper amount, cleaning injection site w/ alcohol, avoiding air bubbles, and checking the needle prior to injection.Discussed may increase/decrease 5 units/24 hrs to desired rigidity (%) initially (within first week), once reached appropriate  dose/desired erectile rigidity (enough for intercourse/masturbation), use Trimix 3-4 days/week. Discussed, and pt return demonstrated where not to inject including the dorsal and ventral aspects and glans, and avoiding the veins. Discussed and reviewed the importance of avoiding overdosing due to risk of priapism. Do not exceed maximum of 50 units w/o contacting office. Discussed and advised to report to local emergency dept for erections lasting for more than 4 hours.    Education sheets provided.    F/u 1 month for ED.    F/u w/ PCP/endocrine re [id number].    Patient expressed and verbalized understanding, and agree with plan.

## 2019-05-07 ENCOUNTER — PATIENT MESSAGE (OUTPATIENT)
Dept: UROLOGY | Facility: CLINIC | Age: 69
End: 2019-05-07

## 2019-05-07 ENCOUNTER — LAB VISIT (OUTPATIENT)
Dept: LAB | Facility: HOSPITAL | Age: 69
End: 2019-05-07
Payer: MEDICARE

## 2019-05-07 ENCOUNTER — OFFICE VISIT (OUTPATIENT)
Dept: UROLOGY | Facility: CLINIC | Age: 69
End: 2019-05-07
Payer: MEDICARE

## 2019-05-07 VITALS — HEART RATE: 90 BPM | SYSTOLIC BLOOD PRESSURE: 139 MMHG | DIASTOLIC BLOOD PRESSURE: 88 MMHG

## 2019-05-07 DIAGNOSIS — R68.82 LOW LIBIDO: ICD-10-CM

## 2019-05-07 DIAGNOSIS — N52.01 ERECTILE DYSFUNCTION DUE TO ARTERIAL INSUFFICIENCY: ICD-10-CM

## 2019-05-07 DIAGNOSIS — N40.1 BPH WITH OBSTRUCTION/LOWER URINARY TRACT SYMPTOMS: Primary | ICD-10-CM

## 2019-05-07 DIAGNOSIS — N13.8 BPH WITH OBSTRUCTION/LOWER URINARY TRACT SYMPTOMS: Primary | ICD-10-CM

## 2019-05-07 LAB — TESTOST SERPL-MCNC: 418 NG/DL (ref 304–1227)

## 2019-05-07 PROCEDURE — 99999 PR PBB SHADOW E&M-EST. PATIENT-LVL IV: CPT | Mod: PBBFAC,,, | Performed by: NURSE PRACTITIONER

## 2019-05-07 PROCEDURE — 99215 OFFICE O/P EST HI 40 MIN: CPT | Mod: S$PBB,,, | Performed by: NURSE PRACTITIONER

## 2019-05-07 PROCEDURE — 99215 PR OFFICE/OUTPT VISIT, EST, LEVL V, 40-54 MIN: ICD-10-PCS | Mod: S$PBB,,, | Performed by: NURSE PRACTITIONER

## 2019-05-07 PROCEDURE — 99999 PR PBB SHADOW E&M-EST. PATIENT-LVL IV: ICD-10-PCS | Mod: PBBFAC,,, | Performed by: NURSE PRACTITIONER

## 2019-05-07 PROCEDURE — 84403 ASSAY OF TOTAL TESTOSTERONE: CPT

## 2019-05-07 PROCEDURE — 36415 COLL VENOUS BLD VENIPUNCTURE: CPT

## 2019-05-07 PROCEDURE — 99214 OFFICE O/P EST MOD 30 MIN: CPT | Mod: PBBFAC | Performed by: NURSE PRACTITIONER

## 2019-05-07 RX ORDER — PAPAVERINE HYDROCHLORIDE 30 MG/ML
INJECTION INTRAMUSCULAR; INTRAVENOUS
Qty: 5 ML | Refills: 6 | Status: SHIPPED | OUTPATIENT
Start: 2019-05-07

## 2019-05-07 NOTE — PATIENT INSTRUCTIONS
Penile Self-Injection Procedure  Self-injection is a good option if you have erectile dysfunction (ED). You insert a tiny needle into your penis and inject a medicine. This helps your penis get hard and stay that way long enough for sex. And sex and orgasm will feel as good as always. You may be nervous about doing self-injection at first. But with practice, it will get easier. Your healthcare provider will show you how to do self-injection the first time.  Talk to your doctor about any medicines you take and any medical problems you have.      Preparing for injection  · Wash your hands well with soap and water.  · Prepare the medicine (if needed).  · Sit or  a comfortable position in a warm, well-lit room. If you need to, sit or  front of a mirror.  · Find an injection site on one side of your penis, in a place with no visible veins. (Dont inject into the top, bottom, or head of the penis.)  · Clean the injection site with an alcohol swab. Grasp the head of your penis firmly with your thumb and forefinger (dont just pinch the skin). Stretch the penis so the skin on the shaft is taut.  Injecting the medicine  · Rest your penis against your inner thigh and pull it gently toward your knee. Dont twist or rotate it. This way youll be sure to inject the medicine into the spot you chose and cleaned before.  · Hold the syringe between your thumb and fingers, like youre holding a pen. Rest your forearm on your thigh for support.  · Insert the needle at a 90° angle (perpendicular) to the shaft. Do this quickly to reduce discomfort. (The needle should go in easily. If it doesnt, stop right away.)  · Move your thumb to the plunger. Press down to inject the medicine, counting to 5.  · Remove the needle and dispose of it safely.  Gaining an erection  · Apply pressure to the injection site for a few minutes. This prevents swelling and bruising and helps spread the medicine.   · Stand up. This may help your  erection develop. Foreplay often helps, too.  · Your penis should become firm within 10 to 20 minutes. The erection will last long enough for sex, and maybe longer.  When to seek medical care  An erection that lasts longer than 3 to 4  hours  · Bleeding or bruising  · Severe pain  · Scarring or curvature of the penis   Date Last Reviewed: 1/7/2017  © 3663-0568 [employer]. [address] [place] [address], [place] [address]. All rights reserved. This information is not intended as a substitute for professional medical care. Always follow your healthcare professional's instructions.        Penile Self-Injection: Notes and Precautions     Man and healthcare provider sitting across from one another, talking.   Penile self-injection is a simple technique that may improve your sex life. Some men even find that self-injection leads to an increase in natural erections. If you have questions or concerns about self-injection or erectile dysfunction (ED), talk with your healthcare provider. The information on this sheet will help you get the best results.  Notes about penile self-injection  · You may feel a mild burning during injection. This is OK. But if you feel pressure or severe pain, stop the injection. There may be a problem with the injection site.  · Only inject the medicine on the side of your penis. It may not work if injected elsewhere.  · To prevent scarring, inject in a different spot each time.  · Dont use this treatment if you have a bleeding disorder or any risk of infection.  · Get medical help right away if your erection lasts longer than 3 to 4 hours.  Work with your healthcare provider  Ask how often you can safely repeat injections, as well as any other questions you have. You and your healthcare provider will talk about follow-up exams and how to get supplies. If the medicine doesnt work or stops working over time, tell your healthcare provider.     When to call your healthcare provider  Call your  healthcare provider right away if any of these occur:  · An erection that lasts longer than 3 to 4  hours  · Bleeding or bruising  · Severe pain  · Scarring or curvature of the penis   Date Last Reviewed: 1/1/2017 © 2000-2017 [employer]. [address] [place] [address]. All rights reserved. This information is not intended as a substitute for professional medical care. Always follow your healthcare professional's instructions.        Understanding Erectile Dysfunction    Erectile dysfunction (ED) is a problem getting an erection firm enough or keeping it long enough for intercourse. The problem can happen to any man at any age. But health problems that can lead to ED become more common as a man ages. Up to half of men over age 40 experience ED at some point.  Causes of ED  ED can have many causes. Most are physical. Some are emotional issues. Often, a combination of causes is involved. Causes of ED may include:  · Medical conditions such as diabetes or depression  · Smoking tobacco or marijuana  · Drinking too much alcohol  · Side effects of medications  · Injury to nerves or blood vessels  · Emotional issues such as stress or relationship problems  ED can be treated  Prescription medications for ED are available. They help many men who try them. Depending upon the cause of the ED, though, medications may not be enough. In these cases, other treatment options are available. These include erectile aids and surgery. Your health care provider can tell you more about the treatment that is right for you. And new treatments for ED are being studied. No matter what the treatment you decide on, stay in touch with your doctor. If your symptoms persist, he or she may be able to adjust your current treatment or try something new.  Date Last Reviewed: 1/1/2017 © 2000-2017 [employer]. [address] [place] [address]. All rights reserved. This information is not intended as a  substitute for professional medical care. Always follow your healthcare professional's instructions.

## 2019-05-08 ENCOUNTER — OFFICE VISIT (OUTPATIENT)
Dept: INTERNAL MEDICINE | Facility: CLINIC | Age: 69
End: 2019-05-08
Payer: MEDICARE

## 2019-05-08 ENCOUNTER — CLINICAL SUPPORT (OUTPATIENT)
Dept: INTERNAL MEDICINE | Facility: CLINIC | Age: 69
End: 2019-05-08
Payer: MEDICARE

## 2019-05-08 VITALS
BODY MASS INDEX: 34.26 KG/M2 | SYSTOLIC BLOOD PRESSURE: 138 MMHG | HEART RATE: 95 BPM | OXYGEN SATURATION: 93 % | WEIGHT: 244.69 LBS | HEIGHT: 71 IN | DIASTOLIC BLOOD PRESSURE: 68 MMHG

## 2019-05-08 DIAGNOSIS — R91.8 ABNORMAL CT LUNG SCREENING: ICD-10-CM

## 2019-05-08 DIAGNOSIS — E11.40 TYPE 2 DIABETES MELLITUS WITH DIABETIC NEUROPATHY, WITHOUT LONG-TERM CURRENT USE OF INSULIN: ICD-10-CM

## 2019-05-08 DIAGNOSIS — Z87.891 FORMER SMOKER: ICD-10-CM

## 2019-05-08 DIAGNOSIS — E04.1 THYROID NODULE: ICD-10-CM

## 2019-05-08 DIAGNOSIS — E11.40 TYPE 2 DIABETES MELLITUS WITH DIABETIC NEUROPATHY, WITHOUT LONG-TERM CURRENT USE OF INSULIN: Primary | ICD-10-CM

## 2019-05-08 DIAGNOSIS — I10 ESSENTIAL HYPERTENSION: ICD-10-CM

## 2019-05-08 LAB
ESTIMATED AVG GLUCOSE: 200 MG/DL (ref 68–131)
HBA1C MFR BLD HPLC: 8.6 % (ref 4–5.6)

## 2019-05-08 PROCEDURE — 83036 HEMOGLOBIN GLYCOSYLATED A1C: CPT

## 2019-05-08 PROCEDURE — 99214 OFFICE O/P EST MOD 30 MIN: CPT | Mod: S$PBB,,, | Performed by: FAMILY MEDICINE

## 2019-05-08 PROCEDURE — 99213 OFFICE O/P EST LOW 20 MIN: CPT | Mod: PBBFAC,PN | Performed by: FAMILY MEDICINE

## 2019-05-08 PROCEDURE — 99999 PR PBB SHADOW E&M-EST. PATIENT-LVL III: ICD-10-PCS | Mod: PBBFAC,,, | Performed by: FAMILY MEDICINE

## 2019-05-08 PROCEDURE — 99214 PR OFFICE/OUTPT VISIT, EST, LEVL IV, 30-39 MIN: ICD-10-PCS | Mod: S$PBB,,, | Performed by: FAMILY MEDICINE

## 2019-05-08 PROCEDURE — 99999 PR PBB SHADOW E&M-EST. PATIENT-LVL III: CPT | Mod: PBBFAC,,, | Performed by: FAMILY MEDICINE

## 2019-05-08 NOTE — PROGRESS NOTES
Subjective:      Patient ID: Caesar Arevalo is a 68 y.o. male.    Chief Complaint:  Thyroid Problem (New patient )      History of Present Illness  Caesar Arevalo presents today for Evaluation & management of thyroid nodule. This is his first visit with me.       Last thyroid US 4/2019   FINDINGS:  The thyroid is normal in size.  Right lobe of the thyroid measures 5.9 x 2.2 x 2.3 cm.  Left lobe of the thyroid measures 5.3 x 2.3 x 1.8 cm.    Multiple bilateral thyroid nodules are present, with 4 index nodules as detailed below.    Nodule #1    Size: 1.8 x 1.4 x 1.5 cm    Location: Right lobe midportion    Composition: solid/almost completely solid (2)    Echogenicity: isoechoic (1)    Shape: wider-than-tall (0)    Margins: smooth (0)    Echogenic foci: punctate echogenic foci (3)    Change: N/A    TI-RADS category: TR4 (4 points) FNA is recommended    Nodule #2    Size: 0.5 x 0.4 x 0.5 cm    Location: Left lobe midportion    Composition: mixed cystic and solid (1)    Echogenicity: anechoic (0)    Shape: wider-than-tall (0)    Margins: smooth (0)    Echogenic foci: none (0)    Change: N/A    TI-RADS category: No follow-up required.      Impression     Recommended FNA thyroidal nodule 1.8 x 1.4 x 1.5 cm RIGHT mid gland.     The patient was not aware of the thyroid nodule prior   No difficulty breathing or swallowing    No voice changes  No FH of thyroid cancer  No personal history of radiation treatment or exposure     No signs or symptoms of hyper or hypothyroidism  TSH has been normal in the past    Any blood thinners? Aspirin daily     He also has type 2 diabetes that is managed by his pcp.  He takes Tradjenta & metformin 1000 mg BID     Per oral recall BG are 140's, highest 170's.    Diabetes Management Status  Statin: Taking  ACE/ARB: Taking  Screening or Prevention Patient's value Goal Complete/Controlled?   HgA1C Testing and Control   Lab Results   Component Value Date    HGBA1C 8.6 (H) 05/08/2019       Annually/Less than 8% No   Lipid profile : 04/08/2019 Annually Yes   LDL control Lab Results   Component Value Date    LDLCALC 58.4 (L) 04/08/2019    Annually/Less than 100 mg/dl  Yes   Nephropathy screening Lab Results   Component Value Date    LABMICR 11.0 03/23/2016     Lab Results   Component Value Date    PROTEINUA Negative 05/15/2018    Annually Yes   Blood pressure BP Readings from Last 1 Encounters:   05/09/19 (!) 144/84    Less than 140/90 Yes   Dilated retinal exam : 08/28/2018 Annually Yes   Foot exam   : 06/08/2018 Annually Yes     Review of Systems   Constitutional: Negative for fatigue and unexpected weight change.   Eyes: Negative for visual disturbance.   Respiratory: Negative for cough and shortness of breath.    Cardiovascular: Negative for chest pain.   Gastrointestinal: Negative for abdominal pain.   Endocrine: Negative for cold intolerance, heat intolerance, polydipsia, polyphagia and polyuria.   Musculoskeletal: Negative for arthralgias.   Skin: Negative for wound.   Neurological: Negative for headaches.   Hematological: Does not bruise/bleed easily.   Psychiatric/Behavioral: Negative for sleep disturbance.     Objective:   Physical Exam   Constitutional: He appears well-developed.   HENT:   Right Ear: External ear normal.   Left Ear: External ear normal.   Nose: Nose normal.   Hearing Normal     Neck: No tracheal deviation present. Thyromegaly present.   + nodules R>L   Cardiovascular: Normal rate.   No murmur heard.  No edema present   Pulmonary/Chest: Effort normal and breath sounds normal.   Abdominal: Soft. He exhibits no mass. No hernia.   Neurological: He is alert. No cranial nerve deficit or sensory deficit.   Skin: No rash noted.   Psychiatric: He has a normal mood and affect. Judgment normal.   Vitals reviewed.    Body mass index is 34.05 kg/m².    Lab Review:   Lab Results   Component Value Date    HGBA1C 8.6 (H) 05/08/2019     Lab Results   Component Value Date    CHOL 112 (L)  04/08/2019    HDL 40 04/08/2019    LDLCALC 58.4 (L) 04/08/2019    TRIG 68 04/08/2019    CHOLHDL 35.7 04/08/2019     Lab Results   Component Value Date     04/08/2019    K 4.0 04/08/2019     04/08/2019    CO2 26 04/08/2019     (H) 04/08/2019    BUN 16 04/08/2019    CREATININE 1.3 04/08/2019    CALCIUM 9.6 04/08/2019    PROT 8.1 04/08/2019    ALBUMIN 4.4 04/08/2019    BILITOT 0.6 04/08/2019    ALKPHOS 73 04/08/2019    AST 20 04/08/2019    ALT 30 04/08/2019    ANIONGAP 8 04/08/2019    ESTGFRAFRICA >60 04/08/2019    EGFRNONAA 57 (A) 04/08/2019    TSH 0.936 04/08/2019     Assessment and Plan     1. Thyroid nodule  US FNA Thyroid, 1st Lesion   2. Type 2 diabetes mellitus without retinopathy     3. Type 2 diabetes mellitus with diabetic neuropathy, without long-term current use of insulin       Thyroid nodule  I have reviewed management options including observation, FNA or surgery.  All of the patients questions were answered.  Will proceed with FNA  Discussed indications for repeat FNA as well as surgical indications (abnormal FNA, compressive symptoms or interval change).  Discussed possible results of FNA- Benign, Malignant, FLUS, or insufficient cells.   Patient will hold aspirin today & tomorrow morning     If FNA is negative then plan RTO in 1 year with TSH and thyroid u/s prior      Type 2 diabetes mellitus with diabetic neuropathy, without long-term current use of insulin  Continue current regimen  Managed by pcp    Follow up in about 1 year (around 5/9/2020).

## 2019-05-09 ENCOUNTER — OFFICE VISIT (OUTPATIENT)
Dept: ENDOCRINOLOGY | Facility: CLINIC | Age: 69
End: 2019-05-09
Payer: MEDICARE

## 2019-05-09 VITALS
HEIGHT: 71 IN | WEIGHT: 244.19 LBS | SYSTOLIC BLOOD PRESSURE: 144 MMHG | DIASTOLIC BLOOD PRESSURE: 84 MMHG | BODY MASS INDEX: 34.19 KG/M2 | HEART RATE: 77 BPM

## 2019-05-09 DIAGNOSIS — E11.9 TYPE 2 DIABETES MELLITUS WITHOUT RETINOPATHY: ICD-10-CM

## 2019-05-09 DIAGNOSIS — E11.40 TYPE 2 DIABETES MELLITUS WITH DIABETIC NEUROPATHY, WITHOUT LONG-TERM CURRENT USE OF INSULIN: ICD-10-CM

## 2019-05-09 DIAGNOSIS — E04.1 THYROID NODULE: Primary | ICD-10-CM

## 2019-05-09 PROCEDURE — 99999 PR PBB SHADOW E&M-EST. PATIENT-LVL III: ICD-10-PCS | Mod: PBBFAC,,, | Performed by: NURSE PRACTITIONER

## 2019-05-09 PROCEDURE — 99214 OFFICE O/P EST MOD 30 MIN: CPT | Mod: S$PBB,,, | Performed by: NURSE PRACTITIONER

## 2019-05-09 PROCEDURE — 99213 OFFICE O/P EST LOW 20 MIN: CPT | Mod: PBBFAC | Performed by: NURSE PRACTITIONER

## 2019-05-09 PROCEDURE — 99214 PR OFFICE/OUTPT VISIT, EST, LEVL IV, 30-39 MIN: ICD-10-PCS | Mod: S$PBB,,, | Performed by: NURSE PRACTITIONER

## 2019-05-09 PROCEDURE — 99999 PR PBB SHADOW E&M-EST. PATIENT-LVL III: CPT | Mod: PBBFAC,,, | Performed by: NURSE PRACTITIONER

## 2019-05-09 NOTE — ASSESSMENT & PLAN NOTE
I have reviewed management options including observation, FNA or surgery.  All of the patients questions were answered.  Will proceed with FNA  Discussed indications for repeat FNA as well as surgical indications (abnormal FNA, compressive symptoms or interval change).  Discussed possible results of FNA- Benign, Malignant, FLUS, or insufficient cells.   Patient will hold aspirin today & tomorrow morning     If FNA is negative then plan RTO in 1 year with TSH and thyroid u/s prior

## 2019-05-09 NOTE — LETTER
May 9, 2019      Sophie Yan MD  [address] [place] [address]  [address]  [place] [address]           [facility] - Endocrinology  [address]  [place] [address]  Phone: [phone]          Patient: Caesar Arevalo   MR Number: [id number]   YOB: 1950   Date of Visit: 5/9/2019       Dear Dr. Sophie Yan:    Thank you for referring Caesar Arevalo to me for evaluation. Attached you will find relevant portions of my assessment and plan of care.    If you have questions, please do not hesitate to call me. I look forward to following Caesar Arevalo along with you.    Sincerely,    Fany Vazquez, TIKI    Enclosure  CC:  No Recipients    If you would like to receive this communication electronically, please contact [email or web address] or [phone] to request more information on [employer] Link access.    For providers and/or their staff who would like to refer a patient to [facility], please contact us through our one-stop-shop provider referral line, [facility], at [phone].    If you feel you have received this communication in error or would no longer like to receive these types of communications, please e-mail [email or web address]

## 2019-05-10 ENCOUNTER — HOSPITAL ENCOUNTER (OUTPATIENT)
Dept: ENDOCRINOLOGY | Facility: CLINIC | Age: 69
Discharge: HOME | End: 2019-05-10
Attending: NURSE PRACTITIONER
Payer: MEDICARE

## 2019-05-10 DIAGNOSIS — E04.1 THYROID NODULE: ICD-10-CM

## 2019-05-10 PROCEDURE — 10005 FNA BX W/US GDN 1ST LES: CPT | Mod: ,,, | Performed by: INTERNAL MEDICINE

## 2019-05-10 PROCEDURE — 88173 CYTOPATH EVAL FNA REPORT: CPT | Performed by: PATHOLOGY

## 2019-05-10 PROCEDURE — 88173 CYTOPATH EVAL FNA REPORT: CPT | Mod: 26,,, | Performed by: PATHOLOGY

## 2019-05-10 PROCEDURE — 10005 FNA BX W/US GDN 1ST LES: CPT

## 2019-05-10 PROCEDURE — 10005 US FINE NEEDLE ASPIRATION THYROID, FIRST LESION: ICD-10-PCS | Mod: ,,, | Performed by: INTERNAL MEDICINE

## 2019-05-10 PROCEDURE — 88173 CYTOLOGY SPECIMEN-FNA NON-RADIOLOGY CLINICIAN PERFORMED W/O ON SITE: ICD-10-PCS | Mod: 26,,, | Performed by: PATHOLOGY

## 2019-06-24 ENCOUNTER — PATIENT MESSAGE (OUTPATIENT)
Dept: ADMINISTRATIVE | Facility: HOSPITAL | Age: 69
End: 2019-06-24

## 2019-07-17 DIAGNOSIS — J30.9 ALLERGIC RHINITIS: ICD-10-CM

## 2019-07-17 RX ORDER — LORATADINE 10 MG/1
TABLET ORAL
Qty: 90 TABLET | Refills: 1 | Status: SHIPPED | OUTPATIENT
Start: 2019-07-17

## 2019-08-16 DIAGNOSIS — E11.9 TYPE 2 DIABETES MELLITUS WITHOUT COMPLICATION: ICD-10-CM

## 2019-09-09 ENCOUNTER — PATIENT OUTREACH (OUTPATIENT)
Dept: ADMINISTRATIVE | Facility: HOSPITAL | Age: 69
End: 2019-09-09

## 2019-09-09 ENCOUNTER — PATIENT MESSAGE (OUTPATIENT)
Dept: ADMINISTRATIVE | Facility: HOSPITAL | Age: 69
End: 2019-09-09

## 2019-09-09 DIAGNOSIS — E11.9 DIABETES MELLITUS WITHOUT COMPLICATION: Primary | ICD-10-CM

## 2019-09-28 ENCOUNTER — TELEPHONE (OUTPATIENT)
Dept: INTERNAL MEDICINE | Facility: CLINIC | Age: 69
End: 2019-09-28

## 2019-09-28 DIAGNOSIS — I70.90 ATHEROSCLEROSIS: ICD-10-CM

## 2019-10-07 ENCOUNTER — PATIENT OUTREACH (OUTPATIENT)
Dept: ADMINISTRATIVE | Facility: HOSPITAL | Age: 69
End: 2019-10-07

## 2019-11-26 ENCOUNTER — PATIENT OUTREACH (OUTPATIENT)
Dept: ADMINISTRATIVE | Facility: HOSPITAL | Age: 69
End: 2019-11-26

## 2019-12-04 ENCOUNTER — PATIENT OUTREACH (OUTPATIENT)
Dept: ADMINISTRATIVE | Facility: HOSPITAL | Age: 69
End: 2019-12-04

## 2020-01-20 ENCOUNTER — PATIENT OUTREACH (OUTPATIENT)
Dept: ADMINISTRATIVE | Facility: HOSPITAL | Age: 70
End: 2020-01-20

## 2020-05-12 ENCOUNTER — PATIENT MESSAGE (OUTPATIENT)
Dept: ADMINISTRATIVE | Facility: HOSPITAL | Age: 70
End: 2020-05-12

## 2020-06-01 ENCOUNTER — PATIENT OUTREACH (OUTPATIENT)
Dept: ADMINISTRATIVE | Facility: HOSPITAL | Age: 70
End: 2020-06-01

## 2020-07-28 ENCOUNTER — PATIENT OUTREACH (OUTPATIENT)
Dept: ADMINISTRATIVE | Facility: HOSPITAL | Age: 70
End: 2020-07-28

## 2020-08-12 ENCOUNTER — PATIENT OUTREACH (OUTPATIENT)
Dept: ADMINISTRATIVE | Facility: HOSPITAL | Age: 70
End: 2020-08-12

## 2020-08-20 ENCOUNTER — PATIENT OUTREACH (OUTPATIENT)
Dept: ADMINISTRATIVE | Facility: HOSPITAL | Age: 70
End: 2020-08-20

## 2020-09-11 ENCOUNTER — PATIENT OUTREACH (OUTPATIENT)
Dept: ADMINISTRATIVE | Facility: HOSPITAL | Age: 70
End: 2020-09-11

## 2020-09-29 ENCOUNTER — PATIENT MESSAGE (OUTPATIENT)
Dept: OTHER | Facility: OTHER | Age: 70
End: 2020-09-29

## 2020-12-11 ENCOUNTER — PATIENT MESSAGE (OUTPATIENT)
Dept: OTHER | Facility: OTHER | Age: 70
End: 2020-12-11

## 2021-01-05 ENCOUNTER — PATIENT MESSAGE (OUTPATIENT)
Dept: ADMINISTRATIVE | Facility: HOSPITAL | Age: 71
End: 2021-01-05

## 2021-04-05 ENCOUNTER — PATIENT MESSAGE (OUTPATIENT)
Dept: ADMINISTRATIVE | Facility: HOSPITAL | Age: 71
End: 2021-04-05

## 2022-02-09 NOTE — PROGRESS NOTES
Occupational therapy daily note       Name: Caesar Arevalo  Clinic Number: [id number]     Therapy Diagnosis: No diagnosis found.  Physician: Sophie Yan MD     Physician Orders: eval and treat   Medical Diagnosis: M19.029 Elbow Arthritis   Surgical Procedure/ Date :none   Evaluation Date: 6/26/2018  Insurance:medicare and [employer]   Insurance Authorization period Expiration: 12/31/18   Plan of Care Certification Period: 8/31/18     Visit # / Visits Authortized: 2 / 20  Time In:3:00  Time Out: 4:00  Total Billable Time:  60  minutes     Precautions: Standard     Subjective      Involved Side:  right  Dominant Side: Right  Date of Onset: several months ago  Mechanism of Injury:  Pain started in elbow then he bowled 3 games in a row one night then  The pain was more intense .   History of Current Condition: pain started and has increased to effect lifting , grasping  And bowling      Imaging: xray   Previous Therapy: none      Patient's Goals for Therapy: full use of right arm no pain  When bowling      Pain:  Functional Pain Scale Rating 0-10:   4/10 on average  4/10 at best  4/10 at worst  Locationright:elbow lateral   Description: Dull  Aggravating Factors: Extending wrist   Easing Factors: ice     Occupation:   Retired   Working presently: retired   Duties: very active with [employer]                Leisure: bowling , he is still bowling one time a year         Past Medical History/Physical Systems Review:   Caesar Arevalo  has a past medical history of Bell's palsy; Diabetes mellitus, type 2; Hypertension; and TB lung, latent.     Caesar Arevalo  has a past surgical history that includes plantar warts; Knee ligament reconstruction; Prostate surgery; Hernia repair (04/2016); and Colonoscopy (N/A, 7/25/2016).     Caesar has a current medication list which includes the following prescription(s): amlodipine, aspirin, atorvastatin, blood sugar diagnostic, blood-glucose meter, cholecalciferol (vitamin d3),  See DRY AMD IMP/PLN. lancets, linagliptin, lisinopril, loratadine, metformin, multivit-min-folic-vit k-lycop, naftifine, omega-3 fatty acids, and tamsulosin.     Review of patient's allergies indicates:  No Known Allergies         Objective      Observation/Appearance:  Skin intact      A ROM. Measured in degrees.    6/26/2018 6/26/2018       Left Right               Elbow flex 135 140               Ext    0 0            Wristext/ flex  40/40 40/40              pro 90 90               Sup    90 90               RD    15 15              UD 20 20        Sensation  Median Nerve Distribution 6/26/2018 6/26/2018     Left Right   [place] Fly       Normal 1.65-2.83 X X      Special Tests:  Right pos resisted wrist ext   Right      Strength (Dyanmometer) and Pinch Strength (Pinch Gauge)  Measured in pounds and psi. Average of three trials.    6/26/2018 6/26/2018     Left Right   Rung II 32 72   Key Pinch 9 7   3pt Pinch 9 5   2pt Pinch 8 4         CMS Impairment/Limitation/Restriction for FOTO initial  Survey     Therapist reviewed FOTO scores for Caesar Arevalo on 6/26/2018.   FOTO documents entered into [employer] - see Media section.                    6/26/2018   Limitation Score: 42%  Category: Self care            Current : CK = at least 40% but < 60% impaired, limited or restricted  Goal: CJ = at least 20% but < 40% impaired, limited or restricted  Discharge:             Treatment      Treatment Time In: 3;00  Treatment Time Out: 4:00  Total Treatment time separate from Evaluation time: 15     Caesar received the following manual therapy techniques for 12 minutes:   -STM over lateral epicondyle     moist  Heat to right elbow X 10 minutes   Caesar received therapeutic exercises for 10 minutes including:    -wrist flexion stretches , yellow flex bar eccentric wrist extension , weighted hammer 1# pro. Supination., hand gripper 35# X 3 minutes. , weight well 1# X 2 minutes       Home Exercise Program/Education:  Issued HEP: wrist flexion  , stretches, , use of ice, limiting bowling to 1 time a week.        and educated on modality use for pain management . Exercises were reviewed and Caesar was able to demonstrate them prior to the end of the session.   Pt received a written copy of exercises to perform at home. Caesar demonstrated good  understanding of the education provided.  Pt was advised to perform these exercises free of pain, and to stop performing them if pain occurs.     Patient/Family Education: role of OT, goals for OT, scheduling/cancellations - pt verbalized understanding. Discussed insurance limitations with patient.           Assessment      Caesar Arevalo is a 67 y.o. male referred to outpatient occupational/hand therapy and presents with a medical diagnosis of right lateral epicondylitis, resulting in weak , decreased Range of motion , and impaired function with bowling task and demonstrates limitations as described in the chart below. Following  medical record review it is determined that pt will benefit from occupational therapy services in order to maximize pain free and/or functional use of right arm .      The following goals were discussed with the patient and patient is in agreement with them as to be addressed in the treatment plan.            GOALS: 8 weeks. Pt agrees with goals set.  Goals:      Short Term (6 weeks on 7/31/18):  1)   Patient to be IND with HEP and modalities for pain management  2)   Increase ROM 10 degrees in wrist ext  motion to increase functional hand use for right /lift    3)   Increase  strength by  20 lbs. to grasp right hand   4)   Increase pinch 1-3 psis for  10     Long Term (by discharge):  1)   Pt will report 2/10 out of 10 pain with right   2)   Patient to score of CJ on FOTO to demonstrate improved perception of functionalright hand/ arm  use.  3)   Pt will return to prior level of function for ADLs and household management                 Plan   Certification Period/Plan of  care expiration: 6/26/2018 to 8/31/18.     Outpatient Occupational Therapy 1 times weekly for 6 weeks may include the following interventions: US 3 mhz, Therapeutic exercises/activities., Iontophoresis with 2.0 cc Dexamethasone and Strengthening.        Leidy Santos, OT

## 2022-03-12 NOTE — ANESTHESIA POSTPROCEDURE EVALUATION
"Anesthesia Post Evaluation    Patient: Caesar Arevalo    Procedure(s) Performed: Procedure(s) (LRB):  TURP, USING LASER (N/A)    Final Anesthesia Type: general  Patient location during evaluation: PACU  Patient participation: Yes- Able to Participate  Level of consciousness: awake and alert  Post-procedure vital signs: reviewed and stable  Pain management: adequate  Airway patency: patent  PONV status at discharge: No PONV  Anesthetic complications: no      Cardiovascular status: blood pressure returned to baseline  Respiratory status: unassisted  Hydration status: euvolemic  Follow-up not needed.        Visit Vitals  /71 (BP Location: Right arm, Patient Position: Lying)   Pulse 83   Temp 36.6 °C (97.9 °F) (Temporal)   Resp 14   Ht 5' 11" (1.803 m)   Wt 108.9 kg (240 lb)   SpO2 95%   BMI 33.47 kg/m²       Pain/Sanchez Score: Sancehz Score: 9 (12/28/2018 11:00 AM)      Vitals - 1 value per visit 6/27/2016 6/27/2016 6/30/2016 7/25/2016   SPO2    95     Vitals - 1 value per visit 7/25/2016 7/28/2016 8/22/2016 8/24/2016   SPO2  96       Vitals - 1 value per visit 10/3/2016 10/11/2016 10/12/2016 10/25/2016   SPO2 96  96      Vitals - 1 value per visit 11/1/2016 11/8/2016 12/5/2016 1/16/2017   SPO2 96        Vitals - 1 value per visit 1/30/2017 2/1/2017 4/17/2017 5/4/2017 6/6/2017   SPO2  96        Vitals - 1 value per visit 6/6/2017 6/12/2017 6/15/2017 6/22/2017 6/26/2017   SPO2 96 94 95  97     Vitals - 1 value per visit 7/28/2017 8/1/2017 8/25/2017 10/16/2017 1/8/2018   SPO2    97      Vitals - 1 value per visit 1/16/2018 3/15/2018 3/28/2018 4/11/2018   SPO2 96 97       Vitals - 1 value per visit 5/15/2018 5/21/2018 5/25/2018 6/8/2018 6/14/2018   SPO2 93    94     Vitals - 1 value per visit 7/16/2018 8/28/2018 8/30/2018 9/13/2018   SPO2   97      Vitals - 1 value per visit 10/3/2018 10/16/2018 11/27/2018 12/28/2018   SPO2    96     " no

## 2023-01-01 NOTE — PROGRESS NOTES
"Subjective:       Patient ID: Caesar Arevalo is a 68 y.o. male.    Chief Complaint: Diabetes and Hypertension    HPI   67 y/o former smoker with DM2, HTN, BPH s/p Laser TURP 12/2018, Vit D def, CLBP, atherosclerosis, colon polyp (tubular adenoma) is here for follow up HTN.      Weight up 2 pounds, He is not very active, but he is bowling. Denies f/n/v/d/constipation/sob/cp/urinary sx.  Sleeping ok, does not want to be re-evaluated in sleep clinic. Appetite ok.    Thyroid nodule: has endocrine appt tomorrow, will set up FNA  DM2: a1c 8.5 4/2019, he has been eating more candy lately, he is checking 3 times a day, low 130 high 170's, on statin, arb, trajenta, eye exam utd and foot exam utd  HTN: amlodipine 10 mg daily, Irbesartan 75 mg daily  BPH: Hx of prostate procedure, followed by urology, doing a little better on low dose flomax  CT scan lung: small opacity in lung, has pulmonary appt in July     Review of Systems   Constitutional: Negative for activity change, appetite change, fatigue and fever.   Respiratory: Negative for cough and shortness of breath.    Cardiovascular: Negative for chest pain, palpitations and leg swelling.   Gastrointestinal: Negative for constipation, diarrhea, nausea and vomiting.   Genitourinary: Negative for difficulty urinating and dysuria.   Skin: Negative for rash.   Neurological: Negative for dizziness and light-headedness.   Psychiatric/Behavioral: Negative for sleep disturbance.       Objective:      /68 (BP Location: Left arm, Patient Position: Sitting, BP Method: X-Large (Manual))   Pulse 95   Ht 5' 11" (1.803 m)   Wt 111 kg (244 lb 11.4 oz)   SpO2 (!) 93%   BMI 34.13 kg/m²   Physical Exam   Constitutional: He appears well-developed and well-nourished.   HENT:   Head: Normocephalic and atraumatic.   Mouth/Throat: No oropharyngeal exudate.   Neck: Normal range of motion. Neck supple. No thyromegaly present.   Cardiovascular: Normal rate, regular rhythm and normal heart " sounds.   Pulmonary/Chest: Effort normal and breath sounds normal. No respiratory distress.   Musculoskeletal: He exhibits no edema.   Lymphadenopathy:     He has no cervical adenopathy.   Neurological: He is alert.   Skin: Skin is warm and dry.   Psychiatric: He has a normal mood and affect.   Nursing note and vitals reviewed.      Assessment:       1. Type 2 diabetes mellitus with diabetic neuropathy, without long-term current use of insulin    2. Thyroid nodule    3. Former smoker    4. Essential hypertension    5. Abnormal CT lung screening        Plan:   Caesar was seen today for diabetes and hypertension.    Diagnoses and all orders for this visit:    Type 2 diabetes mellitus with diabetic neuropathy, without long-term current use of insulin  -     Hemoglobin A1c; Future    Thyroid nodule    Former smoker    Essential hypertension    Abnormal CT lung screening    discussed FRP, he plans to start walking and he will let me know if he wants the FRP referral   DISPLAY PLAN FREE TEXT

## (undated) DEVICE — SET IRR URLGY 2LINE UNIV SPIKE

## (undated) DEVICE — SOL IRR NACL .9% 3000ML

## (undated) DEVICE — TRAY CYSTO BASIN

## (undated) DEVICE — GUIDE WIRE MOTION .035 X 150CM

## (undated) DEVICE — PACK CYSTO

## (undated) DEVICE — GOWN SMARTGOWN LVL4 X-LONG XL

## (undated) DEVICE — SYR 10CC LUER LOCK

## (undated) DEVICE — SOL NS 1000CC

## (undated) DEVICE — SYR 50ML CATH TIP

## (undated) DEVICE — SEE L#95700

## (undated) DEVICE — Device